# Patient Record
Sex: FEMALE | Race: WHITE | NOT HISPANIC OR LATINO | Employment: FULL TIME | URBAN - METROPOLITAN AREA
[De-identification: names, ages, dates, MRNs, and addresses within clinical notes are randomized per-mention and may not be internally consistent; named-entity substitution may affect disease eponyms.]

---

## 2017-04-20 ENCOUNTER — AMBULATORY - HEALTHEAST (OUTPATIENT)
Dept: MULTI SPECIALTY CLINIC | Facility: CLINIC | Age: 27
End: 2017-04-20

## 2017-04-20 LAB — PAP SMEAR - HIM PATIENT REPORTED: NORMAL

## 2020-06-29 ENCOUNTER — OFFICE VISIT - HEALTHEAST (OUTPATIENT)
Dept: FAMILY MEDICINE | Facility: CLINIC | Age: 30
End: 2020-06-29

## 2020-06-29 DIAGNOSIS — J45.40 MODERATE PERSISTENT ASTHMA WITHOUT COMPLICATION: ICD-10-CM

## 2020-06-29 DIAGNOSIS — F41.9 ANXIETY: ICD-10-CM

## 2020-06-29 DIAGNOSIS — J30.2 SEASONAL ALLERGIES: ICD-10-CM

## 2020-06-29 DIAGNOSIS — Z30.9 ENCOUNTER FOR CONTRACEPTIVE MANAGEMENT, UNSPECIFIED TYPE: ICD-10-CM

## 2020-06-29 DIAGNOSIS — L30.9 DERMATITIS: ICD-10-CM

## 2020-06-29 RX ORDER — ALBUTEROL SULFATE 90 UG/1
AEROSOL, METERED RESPIRATORY (INHALATION)
Qty: 1 INHALER | Refills: 1 | Status: SHIPPED | OUTPATIENT
Start: 2020-06-29 | End: 2022-02-22

## 2020-06-29 RX ORDER — TRIAMCINOLONE ACETONIDE 1 MG/G
CREAM TOPICAL
Qty: 30 G | Refills: 3 | Status: SHIPPED | OUTPATIENT
Start: 2020-06-29 | End: 2022-09-20

## 2020-07-18 ENCOUNTER — SURGERY - HEALTHEAST (OUTPATIENT)
Dept: SURGERY | Facility: HOSPITAL | Age: 30
End: 2020-07-18

## 2020-07-18 ENCOUNTER — ANESTHESIA - HEALTHEAST (OUTPATIENT)
Dept: SURGERY | Facility: HOSPITAL | Age: 30
End: 2020-07-18

## 2020-07-18 ASSESSMENT — MIFFLIN-ST. JEOR
SCORE: 1668.43
SCORE: 1704.72

## 2020-07-22 ENCOUNTER — AMBULATORY - HEALTHEAST (OUTPATIENT)
Dept: CARE COORDINATION | Facility: CLINIC | Age: 30
End: 2020-07-22

## 2020-07-22 DIAGNOSIS — J45.40 MODERATE PERSISTENT ASTHMA WITHOUT COMPLICATION: ICD-10-CM

## 2020-07-23 ENCOUNTER — COMMUNICATION - HEALTHEAST (OUTPATIENT)
Dept: NURSING | Facility: CLINIC | Age: 30
End: 2020-07-23

## 2020-07-30 ENCOUNTER — OFFICE VISIT - HEALTHEAST (OUTPATIENT)
Dept: SURGERY | Facility: CLINIC | Age: 30
End: 2020-07-30

## 2020-07-30 DIAGNOSIS — K35.30 ACUTE APPENDICITIS WITH LOCALIZED PERITONITIS, WITHOUT PERFORATION, ABSCESS, OR GANGRENE: ICD-10-CM

## 2020-08-01 ENCOUNTER — COMMUNICATION - HEALTHEAST (OUTPATIENT)
Dept: FAMILY MEDICINE | Facility: CLINIC | Age: 30
End: 2020-08-01

## 2020-08-01 DIAGNOSIS — J45.40 MODERATE PERSISTENT ASTHMA WITHOUT COMPLICATION: ICD-10-CM

## 2020-08-12 ENCOUNTER — AMBULATORY - HEALTHEAST (OUTPATIENT)
Dept: FAMILY MEDICINE | Facility: CLINIC | Age: 30
End: 2020-08-12

## 2020-08-12 DIAGNOSIS — J45.40 MODERATE PERSISTENT ASTHMA WITHOUT COMPLICATION: ICD-10-CM

## 2020-10-19 ENCOUNTER — OFFICE VISIT - HEALTHEAST (OUTPATIENT)
Dept: FAMILY MEDICINE | Facility: CLINIC | Age: 30
End: 2020-10-19

## 2020-10-19 DIAGNOSIS — R30.0 DYSURIA: ICD-10-CM

## 2020-10-19 LAB
ALBUMIN UR-MCNC: NEGATIVE MG/DL
APPEARANCE UR: CLEAR
BACTERIA #/AREA URNS HPF: ABNORMAL HPF
BILIRUB UR QL STRIP: NEGATIVE
COLOR UR AUTO: YELLOW
GLUCOSE UR STRIP-MCNC: NEGATIVE MG/DL
HGB UR QL STRIP: ABNORMAL
KETONES UR STRIP-MCNC: NEGATIVE MG/DL
LEUKOCYTE ESTERASE UR QL STRIP: NEGATIVE
NITRATE UR QL: NEGATIVE
PH UR STRIP: 7 [PH] (ref 5–8)
RBC #/AREA URNS AUTO: ABNORMAL HPF
SP GR UR STRIP: 1.01 (ref 1–1.03)
SQUAMOUS #/AREA URNS AUTO: ABNORMAL LPF
UROBILINOGEN UR STRIP-ACNC: ABNORMAL
WBC #/AREA URNS AUTO: ABNORMAL HPF

## 2020-10-21 ENCOUNTER — COMMUNICATION - HEALTHEAST (OUTPATIENT)
Dept: FAMILY MEDICINE | Facility: CLINIC | Age: 30
End: 2020-10-21

## 2020-10-21 LAB — BACTERIA SPEC CULT: ABNORMAL

## 2020-10-29 ENCOUNTER — OFFICE VISIT - HEALTHEAST (OUTPATIENT)
Dept: FAMILY MEDICINE | Facility: CLINIC | Age: 30
End: 2020-10-29

## 2020-10-29 DIAGNOSIS — N39.0 URINARY TRACT INFECTION: ICD-10-CM

## 2020-10-29 DIAGNOSIS — J45.40 MODERATE PERSISTENT ASTHMA WITHOUT COMPLICATION: ICD-10-CM

## 2020-10-29 DIAGNOSIS — Z30.9 ENCOUNTER FOR CONTRACEPTIVE MANAGEMENT, UNSPECIFIED TYPE: ICD-10-CM

## 2020-10-29 DIAGNOSIS — D50.9 IRON DEFICIENCY ANEMIA, UNSPECIFIED IRON DEFICIENCY ANEMIA TYPE: ICD-10-CM

## 2020-10-29 DIAGNOSIS — F41.9 ANXIETY: ICD-10-CM

## 2020-10-29 DIAGNOSIS — E55.9 VITAMIN D DEFICIENCY: ICD-10-CM

## 2020-10-29 LAB
ALBUMIN UR-MCNC: NEGATIVE MG/DL
APPEARANCE UR: CLEAR
BACTERIA #/AREA URNS HPF: ABNORMAL HPF
BILIRUB UR QL STRIP: NEGATIVE
COLOR UR AUTO: YELLOW
GLUCOSE UR STRIP-MCNC: NEGATIVE MG/DL
HGB UR QL STRIP: ABNORMAL
KETONES UR STRIP-MCNC: NEGATIVE MG/DL
LEUKOCYTE ESTERASE UR QL STRIP: ABNORMAL
NITRATE UR QL: NEGATIVE
PH UR STRIP: 7.5 [PH] (ref 5–8)
RBC #/AREA URNS AUTO: ABNORMAL HPF
SP GR UR STRIP: 1.01 (ref 1–1.03)
SQUAMOUS #/AREA URNS AUTO: ABNORMAL LPF
UROBILINOGEN UR STRIP-ACNC: ABNORMAL
WBC #/AREA URNS AUTO: ABNORMAL HPF

## 2020-10-29 ASSESSMENT — MIFFLIN-ST. JEOR: SCORE: 1759.15

## 2020-10-31 LAB — BACTERIA SPEC CULT: ABNORMAL

## 2020-11-04 ENCOUNTER — COMMUNICATION - HEALTHEAST (OUTPATIENT)
Dept: FAMILY MEDICINE | Facility: CLINIC | Age: 30
End: 2020-11-04

## 2021-03-09 ENCOUNTER — COMMUNICATION - HEALTHEAST (OUTPATIENT)
Dept: FAMILY MEDICINE | Facility: CLINIC | Age: 31
End: 2021-03-09

## 2021-04-05 ENCOUNTER — COMMUNICATION - HEALTHEAST (OUTPATIENT)
Dept: FAMILY MEDICINE | Facility: CLINIC | Age: 31
End: 2021-04-05

## 2021-04-05 ENCOUNTER — COMMUNICATION - HEALTHEAST (OUTPATIENT)
Dept: SCHEDULING | Facility: CLINIC | Age: 31
End: 2021-04-05

## 2021-05-24 ENCOUNTER — OFFICE VISIT - HEALTHEAST (OUTPATIENT)
Dept: FAMILY MEDICINE | Facility: CLINIC | Age: 31
End: 2021-05-24

## 2021-05-24 DIAGNOSIS — Z30.9 ENCOUNTER FOR CONTRACEPTIVE MANAGEMENT, UNSPECIFIED TYPE: ICD-10-CM

## 2021-05-24 DIAGNOSIS — E55.9 VITAMIN D DEFICIENCY: ICD-10-CM

## 2021-05-24 DIAGNOSIS — Z12.4 SCREENING FOR CERVICAL CANCER: ICD-10-CM

## 2021-05-24 DIAGNOSIS — F41.9 ANXIETY: ICD-10-CM

## 2021-05-24 DIAGNOSIS — F32.0 MILD MAJOR DEPRESSION (H): ICD-10-CM

## 2021-05-24 DIAGNOSIS — D50.9 IRON DEFICIENCY ANEMIA, UNSPECIFIED IRON DEFICIENCY ANEMIA TYPE: ICD-10-CM

## 2021-05-24 DIAGNOSIS — Z11.59 ENCOUNTER FOR HEPATITIS C SCREENING TEST FOR LOW RISK PATIENT: ICD-10-CM

## 2021-05-24 DIAGNOSIS — J30.2 SEASONAL ALLERGIES: ICD-10-CM

## 2021-05-24 DIAGNOSIS — E66.812 CLASS 2 OBESITY WITH BODY MASS INDEX (BMI) OF 37.0 TO 37.9 IN ADULT, UNSPECIFIED OBESITY TYPE, UNSPECIFIED WHETHER SERIOUS COMORBIDITY PRESENT: ICD-10-CM

## 2021-05-24 DIAGNOSIS — J45.40 MODERATE PERSISTENT ASTHMA WITHOUT COMPLICATION: ICD-10-CM

## 2021-05-24 DIAGNOSIS — Z11.3 SCREEN FOR STD (SEXUALLY TRANSMITTED DISEASE): ICD-10-CM

## 2021-05-24 LAB
ALBUMIN SERPL-MCNC: 3.5 G/DL (ref 3.5–5)
ALP SERPL-CCNC: 73 U/L (ref 45–120)
ALT SERPL W P-5'-P-CCNC: <9 U/L (ref 0–45)
ANION GAP SERPL CALCULATED.3IONS-SCNC: 15 MMOL/L (ref 5–18)
AST SERPL W P-5'-P-CCNC: 13 U/L (ref 0–40)
BASOPHILS # BLD AUTO: 0 THOU/UL (ref 0–0.2)
BASOPHILS NFR BLD AUTO: 0 % (ref 0–2)
BILIRUB SERPL-MCNC: 0.3 MG/DL (ref 0–1)
BUN SERPL-MCNC: 8 MG/DL (ref 8–22)
CALCIUM SERPL-MCNC: 8.7 MG/DL (ref 8.5–10.5)
CHLORIDE BLD-SCNC: 106 MMOL/L (ref 98–107)
CHOLEST SERPL-MCNC: 161 MG/DL
CO2 SERPL-SCNC: 17 MMOL/L (ref 22–31)
CREAT SERPL-MCNC: 0.65 MG/DL (ref 0.6–1.1)
EOSINOPHIL # BLD AUTO: 0.4 THOU/UL (ref 0–0.4)
EOSINOPHIL NFR BLD AUTO: 4 % (ref 0–6)
ERYTHROCYTE [DISTWIDTH] IN BLOOD BY AUTOMATED COUNT: 14 % (ref 11–14.5)
FASTING STATUS PATIENT QL REPORTED: YES
GFR SERPL CREATININE-BSD FRML MDRD: >60 ML/MIN/1.73M2
GLUCOSE BLD-MCNC: 80 MG/DL (ref 70–125)
HCT VFR BLD AUTO: 37.9 % (ref 35–47)
HDLC SERPL-MCNC: 54 MG/DL
HGB BLD-MCNC: 12.4 G/DL (ref 12–16)
HIV 1+2 AB+HIV1 P24 AG SERPL QL IA: NEGATIVE
IMM GRANULOCYTES # BLD: 0 THOU/UL
IMM GRANULOCYTES NFR BLD: 0 %
LDLC SERPL CALC-MCNC: 85 MG/DL
LYMPHOCYTES # BLD AUTO: 3.4 THOU/UL (ref 0.8–4.4)
LYMPHOCYTES NFR BLD AUTO: 36 % (ref 20–40)
MCH RBC QN AUTO: 27.6 PG (ref 27–34)
MCHC RBC AUTO-ENTMCNC: 32.7 G/DL (ref 32–36)
MCV RBC AUTO: 84 FL (ref 80–100)
MONOCYTES # BLD AUTO: 0.6 THOU/UL (ref 0–0.9)
MONOCYTES NFR BLD AUTO: 6 % (ref 2–10)
NEUTROPHILS # BLD AUTO: 5.1 THOU/UL (ref 2–7.7)
NEUTROPHILS NFR BLD AUTO: 53 % (ref 50–70)
PLATELET # BLD AUTO: 340 THOU/UL (ref 140–440)
PMV BLD AUTO: 9.6 FL (ref 7–10)
POTASSIUM BLD-SCNC: 4.2 MMOL/L (ref 3.5–5)
PROT SERPL-MCNC: 6.9 G/DL (ref 6–8)
RBC # BLD AUTO: 4.5 MILL/UL (ref 3.8–5.4)
SODIUM SERPL-SCNC: 138 MMOL/L (ref 136–145)
TRIGL SERPL-MCNC: 111 MG/DL
WBC: 9.6 THOU/UL (ref 4–11)

## 2021-05-24 RX ORDER — LEVONORGESTREL AND ETHINYL ESTRADIOL 0.15-0.03
1 KIT ORAL DAILY
Qty: 90 TABLET | Refills: 3 | Status: SHIPPED | OUTPATIENT
Start: 2021-05-24 | End: 2022-04-15

## 2021-05-24 RX ORDER — MONTELUKAST SODIUM 10 MG/1
10 TABLET ORAL DAILY
Qty: 90 TABLET | Refills: 3 | Status: SHIPPED | OUTPATIENT
Start: 2021-05-24 | End: 2022-05-10

## 2021-05-24 ASSESSMENT — ANXIETY QUESTIONNAIRES
2. NOT BEING ABLE TO STOP OR CONTROL WORRYING: SEVERAL DAYS
6. BECOMING EASILY ANNOYED OR IRRITABLE: MORE THAN HALF THE DAYS
4. TROUBLE RELAXING: SEVERAL DAYS
1. FEELING NERVOUS, ANXIOUS, OR ON EDGE: SEVERAL DAYS
3. WORRYING TOO MUCH ABOUT DIFFERENT THINGS: MORE THAN HALF THE DAYS
5. BEING SO RESTLESS THAT IT IS HARD TO SIT STILL: SEVERAL DAYS
IF YOU CHECKED OFF ANY PROBLEMS ON THIS QUESTIONNAIRE, HOW DIFFICULT HAVE THESE PROBLEMS MADE IT FOR YOU TO DO YOUR WORK, TAKE CARE OF THINGS AT HOME, OR GET ALONG WITH OTHER PEOPLE: SOMEWHAT DIFFICULT
GAD7 TOTAL SCORE: 8
7. FEELING AFRAID AS IF SOMETHING AWFUL MIGHT HAPPEN: NOT AT ALL

## 2021-05-24 ASSESSMENT — MIFFLIN-ST. JEOR: SCORE: 1772.53

## 2021-05-24 ASSESSMENT — PATIENT HEALTH QUESTIONNAIRE - PHQ9: SUM OF ALL RESPONSES TO PHQ QUESTIONS 1-9: 9

## 2021-05-25 LAB
25(OH)D3 SERPL-MCNC: 25.9 NG/ML (ref 30–80)
25(OH)D3 SERPL-MCNC: 25.9 NG/ML (ref 30–80)
HCV AB SERPL QL IA: NEGATIVE
HPV SOURCE: NORMAL
HUMAN PAPILLOMA VIRUS 16 DNA: NEGATIVE
HUMAN PAPILLOMA VIRUS 18 DNA: NEGATIVE
HUMAN PAPILLOMA VIRUS FINAL DIAGNOSIS: NORMAL
HUMAN PAPILLOMA VIRUS OTHER HR: NEGATIVE
SPECIMEN DESCRIPTION: NORMAL

## 2021-05-26 ENCOUNTER — COMMUNICATION - HEALTHEAST (OUTPATIENT)
Dept: FAMILY MEDICINE | Facility: CLINIC | Age: 31
End: 2021-05-26

## 2021-05-26 DIAGNOSIS — Z30.9 ENCOUNTER FOR CONTRACEPTIVE MANAGEMENT, UNSPECIFIED TYPE: ICD-10-CM

## 2021-05-28 ASSESSMENT — ASTHMA QUESTIONNAIRES
ACT_TOTALSCORE: 22
ACT_TOTALSCORE: 25

## 2021-06-04 VITALS — HEIGHT: 65 IN | WEIGHT: 218 LBS | BODY MASS INDEX: 36.32 KG/M2

## 2021-06-05 VITALS
SYSTOLIC BLOOD PRESSURE: 128 MMHG | TEMPERATURE: 98.3 F | RESPIRATION RATE: 20 BRPM | BODY MASS INDEX: 38.44 KG/M2 | WEIGHT: 231 LBS | HEART RATE: 86 BPM | DIASTOLIC BLOOD PRESSURE: 78 MMHG

## 2021-06-05 VITALS
DIASTOLIC BLOOD PRESSURE: 75 MMHG | SYSTOLIC BLOOD PRESSURE: 135 MMHG | BODY MASS INDEX: 38.32 KG/M2 | HEIGHT: 65 IN | WEIGHT: 230 LBS | HEART RATE: 87 BPM | OXYGEN SATURATION: 97 %

## 2021-06-09 NOTE — PROGRESS NOTES
Clinic Care Coordination Contact  Community Health Worker Initial Outreach      Patient accepts CC: No, due to not having any concerns that she would like assistance with at this time. The patient understands that she is able to ask for assistance anytime in the future.

## 2021-06-09 NOTE — PROGRESS NOTES
Clinic Care Coordination Contact  [facility]/Voicemail       Clinical Data: Care Coordinator Outreach  Outreach attempted x 1.  Left message on patient's voicemail with call back information and requested return call.  Plan: Care Coordinator will try to reach patient again in 1-2 business days.    Next Outreach: 7/24/20

## 2021-06-09 NOTE — ANESTHESIA PREPROCEDURE EVALUATION
Anesthesia Evaluation      Patient summary reviewed     Airway   Mallampati: II   Pulmonary - normal exam   (+) asthma  moderate,                          Cardiovascular - normal exam  Exercise tolerance: > or = 4 METS   Neuro/Psych    (+) anxiety/panic attacks,     Endo/Other - negative ROS   (+) obesity,      GI/Hepatic/Renal - negative ROS      Other findings:   UPT neg  Hgb 12.4  Plt 320  Cr 0.70    Nose ring present      Dental - normal exam                        Anesthesia Plan  Planned anesthetic: general endotracheal    COVID pending - use airborne precautions    GETA, 1 PIV  Fentanyl prn, dilaudid prn, ketamine 40mg, toradol if OK with surgeon  Decadron, zofran, scopolamine patch  ASA 2   Induction: intravenous   Anesthetic plan and risks discussed with: patient  Anesthesia plan special considerations: antiemetics,   Post-op plan: routine recovery

## 2021-06-09 NOTE — PROGRESS NOTES
"Melissa Frank is a 29 y.o. female who is being evaluated via a billable telephone visit.      The patient has been notified of following:     \"This telephone visit will be conducted via a call between you and your physician/provider. We have found that certain health care needs can be provided without the need for a physical exam.  This service lets us provide the care you need with a short phone conversation.  If a prescription is necessary we can send it directly to your pharmacy.  If lab work is needed we can place an order for that and you can then stop by our lab to have the test done at a later time.    Telephone visits are billed at different rates depending on your insurance coverage. During this emergency period, for some insurers they may be billed the same as an in-person visit.  Please reach out to your insurance provider with any questions.    If during the course of the call the physician/provider feels a telephone visit is not appropriate, you will not be charged for this service.\"    Patient has given verbal consent to a Telephone visit? Yes    What phone number would you like to be contacted at? [phone]    Patient would like to receive their AVS by AVS Preference: Mail a copy.    Additional provider notes:   Chief Complaint: Med refills  needs refills on all meds.  Asthma, well controlled with Advair and occasional albuterol use, also singular  Sertraline controls anxiety well.  Needs refill of birth control pills.  Had taken monophasic in past, now on triphasic for the past year or so, thinks triphasic makes her a little nauseous in week before her period  Had Nexplanon in past, good for 8 months, then got her period frequently.  Last pap in 2017, normal, no hx abn paps  Current Med Hx, Fam Hx, Surg Hx all reviewed and updated in chart  Old records requested through Care Everywhere by myself, I reviewed records and updated chart.    Assessment/Plan:  1. Moderate persistent asthma without " complication  Well-controlled on present medications, refilled.  - montelukast (SINGULAIR) 10 mg tablet; Take 1 tablet (10 mg total) by mouth daily.  Dispense: 90 tablet; Refill: 3  - fluticasone propion-salmeteroL (ADVAIR DISKUS) 100-50 mcg/dose DISKUS; Inhale 1 puff 2 (two) times a day.  Dispense: 3 each; Refill: 3  - albuterol (PROAIR HFA;PROVENTIL HFA;VENTOLIN HFA) 90 mcg/actuation inhaler; 1-2 puffs every 4-6 hours, as needed for wheezing.  Dispense: 1 Inhaler; Refill: 1    2. Seasonal allergies  Well-controlled on present medication, refilled.  - montelukast (SINGULAIR) 10 mg tablet; Take 1 tablet (10 mg total) by mouth daily.  Dispense: 90 tablet; Refill: 3    3. Anxiety  Well-controlled on present medication, refilled.  - sertraline (ZOLOFT) 50 MG tablet; Take 1 tablet (50 mg total) by mouth daily.  Dispense: 90 tablet; Refill: 3    4. Contraceptive management - pills  Discussed continuing with same pill (Tri Femynor) or trying monophasic pill, that may decrease nausea.  She would like to try new monophasic pill.  No history of high blood pressure or blood clots, non-smoker.  She has not had any problems with birth control pills in the past, other than nausea mentioned above.  Rx sent.  She will follow-up if any problems.  UTD on pap.  - levonorgestrel-ethinyl estradiol (LEVORA 0.15/30, 28,) 0.15-0.03 mg per tablet; Take 1 tablet by mouth daily.  Dispense: 90 tablet; Refill: 3    5. Dermatitis  Uses occasionally as needed.  Refilled today.  - triamcinolone (KENALOG) 0.1 % cream; APPLY TO AFFECTED AREAS TWICE DAILY UNTIL CLEAR FOR A MAX OF 14 DAYS.  Dispense: 30 g; Refill: 3      Phone call duration:  8 minutes  14:33 14:41    TERRY Holland, [facility]

## 2021-06-09 NOTE — ANESTHESIA CARE TRANSFER NOTE
Last vitals:   Vitals:    07/18/20 1045   BP: 135/63   Pulse: (!) 109   Resp: 12   Temp: 36.6  C (97.8  F)   SpO2: 100%     Patient's level of consciousness is alert and awake    Spontaneous respirations: yes  Maintains airway independently: yes  Dentition unchanged: yes  Oropharynx: oropharynx clear of all foreign objects    QCDR Measures:  ASA# 20 - Surgical Safety Checklist: WHO surgical safety checklist completed prior to induction    PQRS# 430 - Adult PONV Prevention: 4558F - Pt received => 2 anti-emetic agents (different classes) preop & intraop  ASA# 8 - Peds PONV Prevention: NA - Not pediatric patient, not GA or 2 or more risk factors NOT present  PQRS# 424 - [place]-op Temp Management: 4559F - At least one body temp DOCUMENTED => 35.5C or 95.9F within required timeframe  PQRS# 426 - PACU Transfer Protocol: - Transfer of care checklist used  ASA# 14 - Acute Post-op Pain: ASA14B - Patient did NOT experience pain >= 7 out of 10

## 2021-06-09 NOTE — ANESTHESIA POSTPROCEDURE EVALUATION
Patient: Melissa Frank  Procedure(s):  APPENDECTOMY, LAPAROSCOPIC  Anesthesia type: general    Patient location: Phase II Recovery  Last vitals:   Vitals Value Taken Time   /57 7/18/2020 12:00 PM   Temp 36.6  C (97.8  F) 7/18/2020 10:45 AM   Pulse 95 7/18/2020 12:01 PM   Resp 20 7/18/2020 12:01 PM   SpO2 97 % 7/18/2020 12:01 PM   Vitals shown include unvalidated device data.  Post vital signs: stable  Level of consciousness: awake and responds to simple questions  Post-anesthesia pain: pain controlled  Post-anesthesia nausea and vomiting: no  Pulmonary: unassisted, return to baseline  Cardiovascular: stable and blood pressure at baseline  Hydration: adequate  Anesthetic events: no    QCDR Measures:  ASA# 11 - [place]-op Cardiac Arrest: ASA11B - Patient did NOT experience unanticipated cardiac arrest  ASA# 12 - [place]-op Mortality Rate: ASA12B - Patient did NOT die  ASA# 13 - PACU Re-Intubation Rate: ASA13B - Patient did NOT require a new airway mgmt  ASA# 10 - Composite Anes Safety: ASA10A - No serious adverse event    Additional Notes:

## 2021-06-10 NOTE — PROGRESS NOTES
"Melissa Frank is a 29 y.o. female who is being evaluated via a billable telephone visit.      The patient has been notified of following:     \"This telephone visit will be conducted via a call between you and your physician/provider. We have found that certain health care needs can be provided without the need for a physical exam.  This service lets us provide the care you need with a short phone conversation.  If a prescription is necessary we can send it directly to your pharmacy.  If lab work is needed we can place an order for that and you can then stop by our lab to have the test done at a later time.    Telephone visits are billed at different rates depending on your insurance coverage. During this emergency period, for some insurers they may be billed the same as an in-person visit.  Please reach out to your insurance provider with any questions.    If during the course of the call the physician/provider feels a telephone visit is not appropriate, you will not be charged for this service.\"    Patient has given verbal consent to a Telephone visit? Yes    HPI: Pt is s/p lap appy with Dr. Trinidad on 7/18/20.   she is doing well.  Pain is well controlled:  Yes, soreness at this point, tylenol as needed. No difficulties with the surgical wound/wounds, no reports of erythema or drainage.  she is eating well and denies fever and chills. Bowel function as returned to normal.     Assessment/Plan: Doing well after surgery and should follow up as needed.    Alphonse Richardson PA-C  [phone]  General Surgery       Phone call duration: 6 minutes    Alphonse Richardson PA-C    "

## 2021-06-10 NOTE — TELEPHONE ENCOUNTER
"Called and spoke with [facility] pharmacy  Staff, Shereen, Patient's insurance is not contracted with [facility], therefore, inhaler will not be covered. Called and left a message for patient to call back. Please ask patient which pharmacy she will like to use.     \" Okay to relay message\"    "

## 2021-06-10 NOTE — TELEPHONE ENCOUNTER
RN cannot approve Refill Request    RN can NOT refill this medication med is not covered by policy/route to provider. Last office visit: Visit date not found Last Physical: Visit date not found Last MTM visit: Visit date not found Last visit same specialty: Visit date not found.  Next visit within 3 mo: Visit date not found  Next physical within 3 mo: Visit date not found      Mae Cavanaugh, [facility] Triage/Med Refill 8/1/2020    Requested Prescriptions   Pending Prescriptions Disp Refills     WIXELA INHUB 100-50 mcg/dose DISKUS [Pharmacy Med Name: WIXELA 100-50 INHUB]  3     Sig: TAKE 1 PUFF BY MOUTH TWICE A DAY**INS. NOT CONTRACTED**       There is no refill protocol information for this order

## 2021-06-10 NOTE — TELEPHONE ENCOUNTER
Called and spoke with Melissa Frank , Message was given, Melissa Frank understood, She wanted to use [facility] on [address]. Pharmacy was changed.     DOD,   Are you willing to sign the rx to be sent to pharmacy that is contracted with patient's insurances.

## 2021-06-12 NOTE — PATIENT INSTRUCTIONS - HE
Check urine culture    Cipro 500 mg daily for 3 days    Push fluids    Void after intercourse    Follow up if symptoms do no resolve within 48-72 hrs.

## 2021-06-12 NOTE — PROGRESS NOTES
ASSESSMENT/ PLAN    Melissa was seen today for establish care and follow-up.    Urinary tract infection  Reviewed recent visit for UTI and she was treated with cipro x 3 days. Will restart cipro again pending UA/UC. She does have mild CVA tenderness on exam today but otherwise looks good.   -     Urinalysis-UC if Indicated  -     ciprofloxacin HCl (CIPRO) 500 MG tablet; Take 1 tablet (500 mg total) by mouth 2 (two) times a day for 7 days.  -     Culture, Urine    Moderate persistent asthma without complication  In good control. On wixela, singulair and prn albuterol. Advised PPSV23 and patient agreed.     Iron deficiency anemia, unspecified iron deficiency anemia type  History of this in past per my chart review. Recheck of lab work normal per my chart review of her previous clinic visit. Will recheck when she comes back in 6 months to ensure stability    Anxiety  Doing well. On zoloft 50 mgdaily.    Vitamin D deficiency  History of this. Will check in 6 months.    Contraceptive management - pills  Did not tolerate nexplanon in past. Continue with OCPs. No contraindication per chart review.    Other orders  -     Influenza, Seasonal Quad, PF =/> 6months  -     Pneumococcal polysaccharide vaccine 23-valent 1 yo or older, subq/IM        This note was created using Dragon dictation software, spelling errors may occur.     Meseret Carlin MD        SUBJECTIVE   Melissa Frank is a 30 y.o. old female who presented to clinic today for further evaluation of establishing care. Has a history of asthma, anxiety. On OCPs for birth control. Had nexplanon a couple years ago for about a year but she didn't like it because of irregular periods so she had it taken out and she went back on OCPs. Never pregnant before. Has a boyfriend. Not wanting to have a baby yet. Last pap smear 2017.       Review of Systems:  Negative except as noted in HPI      The following portions of the patient's history were reviewed and updated as  "appropriate: past medical history, past surgical history, family history, allergies, current medications and problem list.    Medical History  Active Ambulatory (Non-Hospital) Problems    Diagnosis     Appendicitis     Contraceptive management - pills     Dermatitis     Anxiety     Iron deficiency anemia     Vitamin D deficiency     Dysmenorrhea     Migraine headache     Moderate persistent asthma     Seasonal allergies     Past Medical History:   Diagnosis Date     Anxiety      Asthma        Surgical History  She  has a past surgical history that includes [place] tooth extraction and pr lap,appendectomy (N/A, 7/18/2020).    Social History  Reviewed, and she  reports that she has never smoked. She has never used smokeless tobacco. She reports current alcohol use. She reports previous drug use.    Family History  Reviewed, and family history includes Asthma in her brother, father, and mother; Breast cancer in her maternal aunt and maternal grandmother; Hypertension in her maternal grandmother and mother; Leukemia in her paternal grandfather; Ovarian cancer in her maternal aunt.    Medications  Reviewed and reconciled    Allergies  No Known Allergies      OBJECTIVE  Physical Exam:  Vital signs: /75 (Patient Site: Left Arm, Patient Position: Sitting, Cuff Size: Adult Large)   Pulse 87   Ht 5' 5\" (1.651 m)   Wt (!) 230 lb (104.3 kg)   SpO2 97%   BMI 38.27 kg/m    Weight: (!) 230 lb (104.3 kg)    General appearance: pleasant, appears stated age, cooperative and in no distress  Respiratory: clear to auscultation bilaterally, good air movement throughout, no wheezing or crackles, speaking full sentences without difficulty  Cardiovascular: regular rate and rhythm, no murmur appreciated, no leg edema  Abdomen: active bowel sounds, soft, non-tender, non-distended, left sided CVA tenderness - mild  Skin: no rashes  Neuro: alert oriented x 3, grossly normal otherwise  Psych: normal affect, appropriate conversation "

## 2021-06-12 NOTE — PROGRESS NOTES
DIAGNOSIS:  1. Dysuria, possible very early UTI with good history. Urinalysis-UC if Indicated    Culture, Urine    ciprofloxacin HCl (CIPRO) 500 MG tablet       PLAN:     Check urine culture    Cipro 500 mg daily for 3 days.  Pt elects to treat now and realizes that the culture may come back negative.    Push fluids    Void after intercourse    Follow up if symptoms do no resolve within 48-72 hrs.            HPI:  This am dysuria. Also frequency and urgency.  No blood.   Urine looked cloudy this am.  No backpain.  No fever or chills.  No recent UTIs.  LMP 9-26-20.          Current Outpatient Medications on File Prior to Visit   Medication Sig Dispense Refill     albuterol (PROAIR HFA;PROVENTIL HFA;VENTOLIN HFA) 90 mcg/actuation inhaler 1-2 puffs every 4-6 hours, as needed for wheezing. 1 Inhaler 1     cetirizine (ZYRTEC) 10 MG tablet Take 10 mg by mouth daily.       fluticasone propion-salmeteroL (WIXELA INHUB) 100-50 mcg/dose DISKUS TAKE 1 PUFF BY MOUTH TWICE A  each 3     levonorgestrel-ethinyl estradiol (LEVORA 0.15/30, 28,) 0.15-0.03 mg per tablet Take 1 tablet by mouth daily. 90 tablet 3     montelukast (SINGULAIR) 10 mg tablet Take 1 tablet (10 mg total) by mouth daily. 90 tablet 3     sertraline (ZOLOFT) 50 MG tablet Take 1 tablet (50 mg total) by mouth daily. 90 tablet 3     triamcinolone (KENALOG) 0.1 % cream APPLY TO AFFECTED AREAS TWICE DAILY UNTIL CLEAR FOR A MAX OF 14 DAYS. 30 g 3     [DISCONTINUED] acetaminophen (TYLENOL) 500 MG tablet Take 500 mg by mouth every 6 (six) hours as needed for pain.       [DISCONTINUED] HYDROcodone-acetaminophen 5-325 mg per tablet Take 1-2 tablets by mouth every 4 (four) hours as needed for pain. 16 tablet 0     [DISCONTINUED] HYDROcodone-acetaminophen 5-325 mg per tablet Take 1-2 tablets by mouth every 4 (four) hours as needed for pain. 16 tablet 0     No current facility-administered medications on file prior to visit.        Pmh: reviewed  Psh:  reviewed  Allergy:  reviewed      EXAM:    /78 (Patient Site: Left Arm, Patient Position: Sitting, Cuff Size: Adult Large)   Pulse 86   Temp 98.3  F (36.8  C) (Oral)   Resp 20   Wt (!) 231 lb (104.8 kg)   BMI 38.44 kg/m    GEN:   ALERT, NAD, ORIENTED TIMES THRE  LUNGS: CTA  COR: RRR WITHOUT MURMUR  ABD: SOFT, +BS, NONTX WITHOUT GUARDING OR PERITONEAL SIGNS  SKIN: NO RASH , ULCERS OR LESIONS NOTED:  EXT: WITHOUT EDEMA/SWELLING    Recent Results (from the past 168 hour(s))   Urinalysis-UC if Indicated    Collection Time: 10/19/20 12:03 PM   Result Value Ref Range    Color, UA Yellow Colorless, Yellow, Straw, Light Yellow    Clarity, UA Clear Clear    Glucose, UA Negative Negative    Bilirubin, UA Negative Negative    Ketones, UA Negative Negative    Specific Gravity, UA 1.010 1.005 - 1.030    Blood, UA Trace (!) Negative    pH, UA 7.0 5.0 - 8.0    Protein, UA Negative Negative mg/dL    Urobilinogen, UA 0.2 E.U./dL 0.2 E.U./dL, 1.0 E.U./dL    Nitrite, UA Negative Negative    Leukocytes, UA Negative Negative

## 2021-06-16 PROBLEM — F41.9 ANXIETY: Status: ACTIVE | Noted: 2020-01-15

## 2021-06-16 PROBLEM — F32.0 MILD MAJOR DEPRESSION (H): Status: ACTIVE | Noted: 2021-05-24

## 2021-06-16 PROBLEM — L30.9 DERMATITIS: Status: ACTIVE | Noted: 2020-06-29

## 2021-06-16 PROBLEM — Z30.9 ENCOUNTER FOR CONTRACEPTIVE MANAGEMENT, UNSPECIFIED TYPE: Status: ACTIVE | Noted: 2020-06-29

## 2021-06-16 NOTE — TELEPHONE ENCOUNTER
Reason for Disposition    [1] MODERATE difficulty breathing (e.g., speaks in phrases, SOB even at rest, pulse 100-120) AND [2] NEW-onset or WORSE than normal    Additional Information    Negative: [1] Breathing stopped AND [2] hasn't returned    Negative: Choking on something    Negative: Severe difficulty breathing (e.g., struggling for each breath, speaks in single words)    Negative: Bluish (or gray) lips or face now    Negative: Difficult to awaken or acting confused (e.g., disoriented, slurred speech)    Negative: Passed out (i.e., lost consciousness, collapsed and was not responding)    Negative: Wheezing started suddenly after medicine, an allergic food or bee sting    Negative: Stridor    Negative: Slow, shallow and weak breathing    Negative: Sounds like a life-threatening emergency to the triager    Negative: Chest pain    Negative: [1] Wheezing (high pitched whistling sound) AND [2] previous asthma attacks or use of asthma medicines    Negative: [1] Difficulty breathing AND [2] only present when coughing    Negative: [1] Difficulty breathing AND [2] only from stuffy or runny nose    Protocols used: BREATHING DIFFICULTY-A-AH

## 2021-06-16 NOTE — TELEPHONE ENCOUNTER
Has asthma and has covid.  Really shortness of breath.  HAs neb machine with no meds.  Weak and out of breath to walk across room.  Needs to present to ER for evaluation of covid.    Agrees with plan.    Isis Bourne RN [facility] Triage Nurse Advisor

## 2021-06-21 NOTE — LETTER
Letter by Meseret Carlin MD at      Author: Meseret Carlin MD Service: -- Author Type: --    Filed:  Encounter Date: 5/24/2021 Status: (Other)       My Asthma Action Plan    Name: Melissa Frank   YOB: 1990  Date: 5/24/2021   My doctor: Meseret Carlin MD   My clinic: [facility]        My Control Medicine: Fluticasone propionate + salmeterol (Advair Diskus or Wixela Inhub) -  100/50 mcg 1 puff 2 times daily  My Rescue Medicine: Albuterol (Proair/Ventolin/Proventil HFA) 2-4 puffs EVERY 4 HOURS as needed. Use a spacer if recommended by your provider.   My Oral Steroid Medicine: prednisone if needed, will need appointment My Asthma Severity:   Moderate Persistent  Know your asthma triggers: Patient is unaware of triggers               GREEN ZONE   Good Control    I feel good    No cough or wheeze    Can work, sleep and play without asthma symptoms     Take your asthma control medicine every day.     1. If exercise triggers your asthma, take your rescue medication    15 minutes before exercise or sports, and    During exercise if you have asthma symptoms  2. Spacer to use with inhaler: If you have a spacer, make sure to use it with your inhaler             YELLOW ZONE Getting Worse  I have ANY of these:    I do not feel good    Cough or wheeze    Chest feels tight    Wake up at night 1. Keep taking your Green Zone medications  2. Start taking your rescue medicine:    every 20 minutes for up to 1 hour. Then every 4 hours for 24-48 hours.  3. If you stay in the Yellow Zone for more than 12-24 hours, contact your doctor.  4. If you do not return to the Green Zone in 12-24 hours or you get worse, start taking your oral steroid medicine if prescribed by your provider.           RED ZONE Medical Alert - Get Help  I have ANY of these:    I feel awful    Medicine is not helping    Breathing getting harder    Trouble walking or talking    Nose opens wide to breathe      1. Take your rescue medicine NOW  2. If your provider has prescribed an oral steroid medicine, start taking it NOW  3. Call your doctor NOW  4. If you are still in the Red Zone after 20 minutes and you have not reached your doctor:    Take your rescue medicine again and    Call 911 or go to the emergency room right away    See your regular doctor within 2 weeks of an Emergency Room or Urgent Care visit for follow-up treatment.          Annual Reminders:  Meet with Asthma Educator,  Flu Shot in the Fall, consider Pneumonia Vaccination for patients with asthma (aged 19 and older).    Pharmacy:   [id number] - [place], [place] - [facility] [facility] [address] [place] [address] [address] [address] [address]  [facility] [address]  Phone: [phone] Fax: [phone]    [facility] - [place], [place] - [facility] [facility]  [address] [facility] [address]  Phone: [phone] Fax: [phone]      Electronically signed by Meseret Carlin MD   Date: 05/24/21                    Asthma Triggers  How To Control Things That Make Your Asthma Worse    Triggers are things that make your asthma worse.  Look at the list below to help you find your triggers and what you can do about them.  You can help prevent asthma flare-ups by staying away from your triggers.      Trigger                                                          What you can do   Cigarette Smoke  Tobacco smoke can make asthma worse. Do not allow smoking in your home, car or around you.  Be sure no one smokes at a paulette day care or school.  If you smoke, ask your health care provider for ways to help you quit.  Ask family members to quit too.  Ask your health care provider for a referral to Quit Plan to help you quit smoking, or call 5-430-817-PLAN.     Colds, Flu, Bronchitis  These are common triggers of asthma. Wash your hands often.  Dont touch your eyes, nose or mouth.  Get a flu shot every year.     Dust  Mites  These are tiny bugs that live in cloth or carpet. They are too small to see. Wash sheets and blankets in hot water every week.   Encase pillows and mattress in dust mite proof covers.  Avoid having carpet if you can. If you have carpet, vacuum weekly.   Use a dust mask and HEPA vacuum.   Pollen and Outdoor Mold  Some people are allergic to trees, grass, or weed pollen, or molds. Try to keep your windows closed.  Limit time out doors when pollen count is high.   Ask you health care provider about taking medicine during allergy season.     Animal Dander  Some people are allergic to skin flakes, urine or saliva from pets with fur or feathers. Keep pets with fur or feathers out of your home.    If you cant keep the pet outdoors, then keep the pet out of your bedroom.  Keep the bedroom door closed.  Keep pets off cloth furniture and away from stuffed toys.     Mice, Rats, and Cockroaches   Some people are allergic to the waste from these pests.   Cover food and garbage.  Clean up spills and food crumbs.  Store grease in the refrigerator.   Keep food out of the bedroom.   Indoor Mold  This can be a trigger if your home has high moisture. Fix leaking faucets, pipes, or other sources of water.   Clean moldy surfaces.  Dehumidify basement if it is damp and smelly.   Smoke, Strong Odors, and Sprays  These can reduce air quality. Stay away from strong odors and sprays, such as perfume, powder, hair spray, paints, smoke incense, paint, cleaning products, candles and new carpet.   Exercise or Sports  Some people with asthma have this trigger. Be active!  Ask your doctor about taking medicine before sports or exercise to prevent symptoms.    Warm up for 5-10 minutes before and after sports or exercise.     Other Triggers of Asthma  Cold air:  Cover your nose and mouth with a scarf.  Sometimes laughing or crying can be a trigger.  Some medicines and food can trigger asthma.

## 2021-06-21 NOTE — LETTER
Letter by Bang Diaz MD at      Author: Bang Diaz MD Service: -- Author Type: --    Filed:  Encounter Date: 10/21/2020 Status: (Other)         Melissa Frank  [address] [place] [id number]             October 21, 2020         Dear Ms. Frank,    Below are the results from your recent visit:    Resulted Orders   Urinalysis-UC if Indicated   Result Value Ref Range    Color, UA Yellow Colorless, Yellow, Straw, Light Yellow    Clarity, UA Clear Clear    Glucose, UA Negative Negative    Bilirubin, UA Negative Negative    Ketones, UA Negative Negative    Specific Gravity, UA 1.010 1.005 - 1.030    Blood, UA Trace (!) Negative    pH, UA 7.0 5.0 - 8.0    Protein, UA Negative Negative mg/dL    Urobilinogen, UA 0.2 E.U./dL 0.2 E.U./dL, 1.0 E.U./dL    Nitrite, UA Negative Negative    Leukocytes, UA Negative Negative    Bacteria, UA None Seen None Seen hpf    RBC, UA 0-2 None Seen, 0-2 hpf    WBC, UA None Seen None Seen, 0-5 hpf    Squam Epithel, UA 0-5 None Seen, 0-5 lpf    Narrative    UC not indicated   Culture, Urine   Result Value Ref Range    Culture >100,000 col/ml Staphylococcus saprophyticus (!)       Comment:      Responds to urine concentrations of Nitrofurantoin, Trimethoprim/Sulfa, Levofloxacin       Hi Melissa:  The urine culture came back positive.  Complete course of Cipro and follow up if there are any persistent symptoms.    Please call with questions or contact us using [employer].    Sincerely,        Electronically signed by Bang Diaz MD

## 2021-06-21 NOTE — LETTER
Letter by Meseret Carlin MD at      Author: Meseret Carlin MD Service: -- Author Type: --    Filed:  Encounter Date: 5/24/2021 Status: (Other)                    My Depression Action Plan  Name: Melissa Frank   Date of Birth 1990  Date: 5/24/2021    My Doctor: Meseret Carlin MD   My Clinic: [address] [facility] [address]  [phone]          GREEN    ZONE   Good Control    What it looks like:     Things are going generally well. You have normal ups and downs. You may even feel depressed from time to time, but bad moods usually last less than a day.   What you need to do:  1. Continue to care for yourself (see self care plan)  2. Check your depression survival kit and update it as needed  3. Follow your physicians recommendations including any medication.  4. Do not stop taking medication unless you consult with your physician first.           YELLOW         ZONE Getting Worse    What it looks like:     Depression is starting to interfere with your life.     It may be hard to get out of bed; you may be starting to isolate yourself from others.    Symptoms of depression are starting to last most all day and this has happened for several days.     You may have suicidal thoughts but they are not constant.   What you need to do:     1. Call your care team. Your response to treatment will improve if you keep your care team informed of your progress. Yellow periods are signs an adjustment may need to be made.     2. Continue your self-care.  Just get dressed and ready for the day.  Don't give yourself time to talk yourself out of it.    3. Talk to someone in your support network.    4. Open up your depression Depression Self-Care Plan / Wellness kit.           RED    ZONE Medical Alert - Get Help    What it looks like:     Depression is seriously interfering with your life.     You may experience these or other symptoms: You cant get out of bed most  days, cant work or engage in other necessary activities, you have trouble taking care of basic hygiene, or basic responsibilities, thoughts of suicide or death that will not go away, self-injurious behavior.     What you need to do:  1. Call your care team and request a same-day appointment. If they are not available (weekends or after hours) call your local crisis line, emergency room or 911.          Depression Self-Care Plan / Wellness Kit    Many people find that medication and therapy are helpful treatments for managing depression. In addition, making small changes to your everyday life can help to boost your mood and improve your wellbeing. Below are some tips for you to consider. Be sure to talk with your medical provider and/or behavioral health consultant if your symptoms are worsening or not improving.     Sleep  Sleep hygiene means all of the habits that support good, restful sleep. It includes maintaining a consistent bedtime and wake time, using your bedroom only for sleeping or sex, and keeping the bedroom dark and free of distractions like a computer, smartphone, or television.     Develop a Healthy Routine  Maintain good hygiene. Get out of bed in the morning, make your bed, brush your teeth, take a shower, and get dressed. Dont spend too much time viewing media that makes you feel stressed. Find time to relax each day.    Exercise  Get some form of exercise every day. This will help reduce pain and release endorphins, the feel good chemicals in your brain. It can be as simple as just going for a walk or doing some gardening, anything that will get you moving.      Diet  Strive to eat healthy foods, including fruits and vegetables. Drink plenty of water. Avoid excessive sugar, caffeine, alcohol, and other mood-altering substances.     Stay Connected with Others  Stay in touch with friends and family members.    Manage Your Mood  Try deep breathing, massage therapy, biofeedback, or meditation. Take  part in fun activities when you can. Try to find something to smile about each day.     Psychotherapy  Be open to working with a therapist if your provider recommends it.     Medication  Be sure to take your medication as prescribed. Most anti-depressants need to be taken every day. It usually takes several weeks for medications to work. Not all medicines work for all people. It is important to follow-up with your provider to make sure you have a treatment plan that is working for you. Do not stop your medication abruptly without first discussing it with your provider.    Crisis Resources   These hotlines are for both adults and children. They and are open 24 hours a day, 7 days a week unless noted otherwise.      National Suicide Prevention Lifeline   5-120-829-TALK (7001)      Crisis Text Line    www.crisistextline.org  Text HOME to 004790 from anywhere in the [place], anytime, about any type of crisis. A live, trained crisis counselor will receive the text and respond quickly.      [place] Lifeline for LGBTQ Youth  A national crisis intervention and suicide lifeline for LGBTQ youth under 25. Provides a safe place to talk without judgement. Call 1-622.579.6339; text START to 214476 or visit www.the[place]ay[place]project.org to talk to a trained counselor.      For [facility] crisis numbers, visit the [facility] website at:  https://[place].gov/dhs/people-we-serve/adults/health-care/mental-health/resources/crisis-contacts.jsp

## 2021-06-26 ENCOUNTER — HEALTH MAINTENANCE LETTER (OUTPATIENT)
Age: 31
End: 2021-06-26

## 2021-06-30 NOTE — PROGRESS NOTES
Progress Notes by Meseret Carlin MD at 5/24/2021 12:50 PM     Author: Meseret Carlin MD Service: -- Author Type: Physician    Filed: 5/24/2021  4:32 PM Encounter Date: 5/24/2021 Status: Signed    : Meseret Carlin MD (Physician)       FEMALE PREVENTATIVE EXAM    Assessment and Plan:     Patient has been advised of split billing requirements and indicates understanding: Yes    Melissa was seen today for annual exam.    Encounter for hepatitis C screening test for low risk patient  -     Lipid [place] FASTING  -     Comprehensive Metabolic Panel  -     Hepatitis C Antibody (Anti-HCV)    Moderate persistent asthma without complication  Stable. ACT score good.   -     fluticasone propion-salmeteroL (WIXELA INHUB) 100-50 mcg/dose DISKUS; TAKE 1 PUFF BY MOUTH TWICE A DAY  -     montelukast (SINGULAIR) 10 mg tablet; Take 1 tablet (10 mg total) by mouth daily.    Contraceptive management - pills  Tolerating well. Discussed IUD as an option. nexplanon caused increased irregular bleeding in the past.   -     levonorgestrel-ethinyl estradiol (LEVORA 0.15/30, 28,) 0.15-0.03 mg per tablet; Take 1 tablet by mouth daily.    Seasonal allergies  Would like further allergy testing and potentially shots.   -     montelukast (SINGULAIR) 10 mg tablet; Take 1 tablet (10 mg total) by mouth daily.  -     Ambulatory referral to Allergy    Anxiety  Has tried 75 mg of zoloft before but didn't tolerate side effects. Will send to psychology.   -     sertraline (ZOLOFT) 50 MG tablet; Take 1 tablet (50 mg total) by mouth daily.  -     AMB REFERRAL TO MENTAL HEALTH AND ADDICTION  - Adult (18+); Outpatient Treatment; Individual/Couples/Family/Group Therapy/Health Psychology; [facility]; Any Clinic Location; We will contact you to schedule the appointment or plea...    Iron deficiency anemia, unspecified iron deficiency anemia type  -     HM1(CBC and Differential)    Screening for cervical cancer  -     Gynecologic  Cytology (PAP Smear)    Vitamin D deficiency  -     Vitamin D, Total (25-Hydroxy)    Screen for STD (sexually transmitted disease)  -     HIV Antigen/Antibody Screening [facility]    Mild major depression (H)  zoloft 50 mg daily. Will send to psychology. Stressors at home. Possibly looking for a new job soon.   -     AMB REFERRAL TO MENTAL HEALTH AND ADDICTION  - Adult (18+); Outpatient Treatment; Individual/Couples/Family/Group Therapy/Health Psychology; [facility]; Any Clinic Location; We will contact you to schedule the appointment or plea...    Class 2 obesity with body mass index (BMI) of 37.0 to 37.9 in adult, unspecified obesity type, unspecified whether serious comorbidity present  Discussed diet/exercise and weight loss specialist referral. Patient will try on her own and will contact me for referral if needed.       Next follow up:  Return in about 1 year (around 5/24/2022) for Annual physical, chronic medical conditions.    Immunization Review  Adult Imm Review: No immunizations due today  BMI: 37.89  non-smoker    I discussed the following with the patient:   Adult Healthy Living: Importance of regular exercise  Healthy nutrition  Weight loss referral options        Subjective:   Chief Complaint: Melissa Frank is an 30 y.o. female here for a preventative health visit.    Patient has been advised of split billing requirements and indicates understanding: Yes  HPI:    Chief Complaint   Patient presents with   ? Annual Exam     Allergy referral to do allergy shots, fasting labs-would like calcium checked     Struggling with allergies worse in spring but year round.     Healthy Habits  Are you taking a daily aspirin? No  Do you typically exercising at least 40 min, 3-4 times per week?  NO - trying to walk her puppy more.   Do you usually eat at least 4 servings of fruit and vegetables a day, include whole grains and fiber and avoid regularly eating high fat foods? NO - working on getting  "fruits/veggies.   Have you had an eye exam in the past two years? Yes  Do you see a dentist twice per year? Yes  Do you have any concerns regarding sleep? No    Safety Screen  Do you feel you are safe where you are living?: Yes (5/24/2021 12:46 PM)  Do you feel you are safe in your relationship(s)?: Yes (5/24/2021 12:46 PM)      Review of Systems:  Please see above.  The rest of the review of systems are negative for all systems.     Pap History:   Yes - updated in Problem List and Health Maintenance accordingly  Cancer Screening       Status Date      PAP SMEAR Overdue 4/20/2020      Done 4/20/2017 PAP SMEAR EXTERNAL RESULT          Patient Care Team:  Meseret Carlin MD as PCP - General (Family Medicine)  Alphonse Richardson PA-C as Assigned Musculoskeletal Provider  Meseret Carlin MD as Assigned PCP        History     Reviewed By Date/Time Sections Reviewed    Nidia Aldana LPN 5/24/2021 12:45 PM Tobacco    Nidia Aldana LPN 5/24/2021 12:42 PM Tobacco            Objective:   Vital Signs:   Visit Vitals  /74 (Patient Site: Left Arm, Patient Position: Sitting, Cuff Size: Adult Large)   Pulse 77   Resp 16   Ht 5' 5.5\" (1.664 m)   Wt (!) 231 lb 3.2 oz (104.9 kg)   LMP 05/08/2021 (Exact Date)   BMI 37.89 kg/m           PHYSICAL EXAM  GENERAL: Healthy, alert and no distress  RESP: lungs clear to auscultation - no rales, rhonchi or wheezes, no rales , no rhonchi and no wheezes  BREAST: normal without masses, tenderness or nipple discharge and no palpable axillary masses or adenopathy  CV: regular rates and rhythm, normal S1 S2, no S3 or S4 and no murmur, click or rub  ABDOMEN: soft, nontender, without hepatosplenomegaly or masses   (female): normal female external genitalia, normal urethral meatus , vaginal mucosa pink, moist, well rugated and normal cervix, adnexae, and uterus without masses.  NEURO: Cranial nerves grossly intact. Mentation and speech appropriate for age.  PSYCH: Mentation appears " normal, affect normal/bright, judgement and insight intact, normal speech and appearance well-groomed           Medication List          Accurate as of May 24, 2021  4:27 PM. If you have any questions, ask your nurse or doctor.            CONTINUE taking these medications    albuterol 90 mcg/actuation inhaler  Also known as: PROAIR HFA;PROVENTIL HFA;VENTOLIN HFA  INSTRUCTIONS: 1-2 puffs every 4-6 hours, as needed for wheezing.  Doctor's comments: May substitute the equivalent medication per insurance preference.        cetirizine 10 MG tablet  Also known as: ZyrTEC  INSTRUCTIONS: Take 10 mg by mouth daily.        fluticasone propion-salmeteroL 100-50 mcg/dose DISKUS  Also known as: Wixela Inhub  INSTRUCTIONS: TAKE 1 PUFF BY MOUTH TWICE A DAY        levonorgestrel-ethinyl estradiol 0.15-0.03 mg per tablet  Also known as: Levora 0.15/30 (28)  INSTRUCTIONS: Take 1 tablet by mouth daily.        montelukast 10 mg tablet  Also known as: SINGULAIR  INSTRUCTIONS: Take 1 tablet (10 mg total) by mouth daily.        sertraline 50 MG tablet  Also known as: ZOLOFT  INSTRUCTIONS: Take 1 tablet (50 mg total) by mouth daily.        triamcinolone 0.1 % cream  Also known as: KENALOG  INSTRUCTIONS: APPLY TO AFFECTED AREAS TWICE DAILY UNTIL CLEAR FOR A MAX OF 14 DAYS.              Where to Get Your Medications      These medications were sent to [facility] - [address] [address]    Phone: [phone]     fluticasone propion-salmeteroL 100-50 mcg/dose DISKUS    levonorgestrel-ethinyl estradiol 0.15-0.03 mg per tablet    montelukast 10 mg tablet    sertraline 50 MG tablet         Additional Screenings Completed Today:

## 2021-07-06 VITALS
HEART RATE: 77 BPM | WEIGHT: 231.2 LBS | DIASTOLIC BLOOD PRESSURE: 74 MMHG | RESPIRATION RATE: 16 BRPM | BODY MASS INDEX: 37.16 KG/M2 | HEIGHT: 66 IN | SYSTOLIC BLOOD PRESSURE: 126 MMHG

## 2021-07-06 ASSESSMENT — PATIENT HEALTH QUESTIONNAIRE - PHQ9: SUM OF ALL RESPONSES TO PHQ QUESTIONS 1-9: 9

## 2021-07-08 ENCOUNTER — COMMUNICATION - HEALTHEAST (OUTPATIENT)
Dept: FAMILY MEDICINE | Facility: CLINIC | Age: 31
End: 2021-07-08

## 2021-07-08 DIAGNOSIS — J45.40 MODERATE PERSISTENT ASTHMA WITHOUT COMPLICATION: ICD-10-CM

## 2021-07-08 ASSESSMENT — ANXIETY QUESTIONNAIRES: GAD7 TOTAL SCORE: 8

## 2021-07-08 ASSESSMENT — ASTHMA QUESTIONNAIRES: ACT_TOTALSCORE: 24

## 2021-07-08 NOTE — TELEPHONE ENCOUNTER
Telephone Encounter by Angy Hightower MA at 7/8/2021  9:07 AM     Author: Angy Hightower MA Service: -- Author Type: Certified Medical Assistant    Filed: 7/8/2021  9:07 AM Encounter Date: 7/8/2021 Status: Signed    : Angy Hightower MA (Certified Medical Assistant)       Rx pended for [facility]

## 2021-07-20 ENCOUNTER — OFFICE VISIT (OUTPATIENT)
Dept: ALLERGY | Facility: CLINIC | Age: 31
End: 2021-07-20
Payer: COMMERCIAL

## 2021-07-20 VITALS — WEIGHT: 231 LBS | BODY MASS INDEX: 37.12 KG/M2 | HEART RATE: 82 BPM | HEIGHT: 66 IN | OXYGEN SATURATION: 99 %

## 2021-07-20 DIAGNOSIS — J30.89 ALLERGIC RHINITIS DUE TO DUST MITE: ICD-10-CM

## 2021-07-20 DIAGNOSIS — J30.89 ALLERGIC RHINITIS CAUSED BY MOLD: ICD-10-CM

## 2021-07-20 DIAGNOSIS — J45.30 MILD PERSISTENT ASTHMA WITHOUT COMPLICATION: Primary | ICD-10-CM

## 2021-07-20 DIAGNOSIS — J30.81 ALLERGIC RHINITIS DUE TO ANIMALS: ICD-10-CM

## 2021-07-20 PROCEDURE — 95004 PERQ TESTS W/ALRGNC XTRCS: CPT | Performed by: ALLERGY & IMMUNOLOGY

## 2021-07-20 PROCEDURE — 99204 OFFICE O/P NEW MOD 45 MIN: CPT | Mod: 25 | Performed by: ALLERGY & IMMUNOLOGY

## 2021-07-20 RX ORDER — ALBUTEROL SULFATE 90 UG/1
AEROSOL, METERED RESPIRATORY (INHALATION)
COMMUNITY
Start: 2020-06-29 | End: 2022-01-11

## 2021-07-20 RX ORDER — FLUOCINOLONE ACETONIDE 0.25 MG/G
OINTMENT TOPICAL
COMMUNITY
Start: 2020-01-22 | End: 2022-09-20

## 2021-07-20 RX ORDER — TRIAMCINOLONE ACETONIDE 1 MG/G
CREAM TOPICAL
COMMUNITY
Start: 2020-01-15 | End: 2022-01-11

## 2021-07-20 RX ORDER — HYDROCORTISONE 25 MG/G
OINTMENT TOPICAL
COMMUNITY
Start: 2020-01-22 | End: 2022-09-20

## 2021-07-20 RX ORDER — MONTELUKAST SODIUM 10 MG/1
10 TABLET ORAL
COMMUNITY
Start: 2020-08-14 | End: 2022-01-11

## 2021-07-20 ASSESSMENT — ASTHMA QUESTIONNAIRES
QUESTION_2 LAST FOUR WEEKS HOW OFTEN HAVE YOU HAD SHORTNESS OF BREATH: ONCE OR TWICE A WEEK
QUESTION_1 LAST FOUR WEEKS HOW MUCH OF THE TIME DID YOUR ASTHMA KEEP YOU FROM GETTING AS MUCH DONE AT WORK, SCHOOL OR AT HOME: NONE OF THE TIME
QUESTION_3 LAST FOUR WEEKS HOW OFTEN DID YOUR ASTHMA SYMPTOMS (WHEEZING, COUGHING, SHORTNESS OF BREATH, CHEST TIGHTNESS OR PAIN) WAKE YOU UP AT NIGHT OR EARLIER THAN USUAL IN THE MORNING: NOT AT ALL
QUESTION_4 LAST FOUR WEEKS HOW OFTEN HAVE YOU USED YOUR RESCUE INHALER OR NEBULIZER MEDICATION (SUCH AS ALBUTEROL): ONCE A WEEK OR LESS
QUESTION_5 LAST FOUR WEEKS HOW WOULD YOU RATE YOUR ASTHMA CONTROL: WELL CONTROLLED
ACT_TOTALSCORE: 22

## 2021-07-20 ASSESSMENT — MIFFLIN-ST. JEOR: SCORE: 1771.62

## 2021-07-20 NOTE — PROGRESS NOTES
"        Subjective       HPI chief complaint: Allergies    History of present illness: This is a pleasant 30-year-old woman I was asked to see for evaluation of allergies by Dr. Carlin.  Patient states she had allergies for many years.  She has symptoms throughout the year but feels that her symptoms worsen during the spring, summer and fall.  Symptoms consist of itchy, watery eyes, sneezing and congestion as well as drainage.  She had swollen eyes this year for the first time.  She does have a history of asthma which is triggered by her allergies.  She currently takes Advair 100-51 puff twice daily.  She states that her insurance would not cover the higher dose and she feels that her asthma was better controlled on the higher dose.  She rarely needs to use her albuterol inhaler.  She does use Zyrtec and montelukast and was recently prescribed fluticasone nasal spray which she has not had a chance yet to use.  She is never been allergy tested would like to consider allergy shots.    Past medical history: Appendectomy    Social history: She works as a [employer] , has a dog, non-smoking , lives in a home with central air and a basement    Family history: Mom dad brother and grandparents with asthma, dad brother and grandmother with allergies        Review of Systems   Constitutional, HEENT, cardiovascular, pulmonary, GI, , musculoskeletal, neuro, skin, endocrine and psych systems are negative, except as otherwise noted.      Objective    Pulse 82   Ht 1.664 m (5' 5.5\")   Wt 104.8 kg (231 lb)   SpO2 99%   BMI 37.86 kg/m    Body mass index is 37.86 kg/m .  Physical Exam      Gen: Pleasant female not in acute distress  HEENT: Eyes no erythema of the bulbar or palpebral conjunctiva, no edema. Ears: TMs well visualized, no effusions. Nose: congestion, mucosa normal. Mouth: Throat drainage , no lip or tongue edema.   Cardiac: Regular rate and rhythm, no murmurs, rubs or " gallops  Respiratory: Clear to auscultation bilaterally, no adventitious breath sounds  Lymph: No visible supraclavicular or cervical lymphadenopathy  Skin: No rashes or lesions  Psych: Alert and oriented times 3    30 percutaneous test were placed to the environmental skin test panel, positive histamine control with a positive test to cat dog dust mite grass and Alternaria.  See photograph scanned on date of service.    Impression report and plan:  1.  Allergic rhinoconjunctivitis  2.  Mild persistent asthma    I think increasing her Advair to 250-51 puff twice daily will be important.  Continue current medication regimen otherwise.  Reviewed environmental control.  I went over the risks and benefits of allergy shots.  I stated risks include hives, swelling, shortness of breath.  I did state that one in 2.5 million shot administrations can result in death.  I stated they must wait in the office for 30 minutes following the shot and carry an epinephrine device on the day of the shot.  I stated that shots are effective in about 90% of patients.  I stated that they should check with the insurance company prior to proceeding.  They understand the risks and benefits and will let me know if she would like to proceed.

## 2021-07-20 NOTE — LETTER
"    7/20/2021         RE: Melissa Frank  [address] [facility] [address]        Dear Colleague,    Thank you for referring your patient, Melissa Frank, to the [facility].  She would like to consider allergy shots.   Please see a copy of my visit note below.            Subjective       HPI chief complaint: Allergies    History of present illness: This is a pleasant 30-year-old woman I was asked to see for evaluation of allergies by Dr. Carlin.  Patient states she had allergies for many years.  She has symptoms throughout the year but feels that her symptoms worsen during the spring, summer and fall.  Symptoms consist of itchy, watery eyes, sneezing and congestion as well as drainage.  She had swollen eyes this year for the first time.  She does have a history of asthma which is triggered by her allergies.  She currently takes Advair 100-51 puff twice daily.  She states that her insurance would not cover the higher dose and she feels that her asthma was better controlled on the higher dose.  She rarely needs to use her albuterol inhaler.  She does use Zyrtec and montelukast and was recently prescribed fluticasone nasal spray which she has not had a chance yet to use.  She is never been allergy tested would like to consider allergy shots.    Past medical history: Appendectomy    Social history: She works as a mortgage cholecyst , has a dog, non-smoking , lives in a home with central air and a basement    Family history: Mom dad brother and grandparents with asthma, dad brother and grandmother with allergies        Review of Systems   Constitutional, HEENT, cardiovascular, pulmonary, GI, , musculoskeletal, neuro, skin, endocrine and psych systems are negative, except as otherwise noted.      Objective    Pulse 82   Ht 1.664 m (5' 5.5\")   Wt 104.8 kg (231 lb)   SpO2 99%   BMI 37.86 kg/m    Body mass index is 37.86 kg/m .  Physical Exam      Gen: " Pleasant female not in acute distress  HEENT: Eyes no erythema of the bulbar or palpebral conjunctiva, no edema. Ears: TMs well visualized, no effusions. Nose: congestion, mucosa normal. Mouth: Throat drainage , no lip or tongue edema.   Cardiac: Regular rate and rhythm, no murmurs, rubs or gallops  Respiratory: Clear to auscultation bilaterally, no adventitious breath sounds  Lymph: No visible supraclavicular or cervical lymphadenopathy  Skin: No rashes or lesions  Psych: Alert and oriented times 3    30 percutaneous test were placed to the environmental skin test panel, positive histamine control with a positive test to cat dog dust mite grass and Alternaria.  See photograph scanned on date of service.    Impression report and plan:  1.  Allergic rhinoconjunctivitis  2.  Mild persistent asthma    I think increasing her Advair to 250-51 puff twice daily will be important.  Continue current medication regimen otherwise.  Reviewed environmental control.  I went over the risks and benefits of allergy shots.  I stated risks include hives, swelling, shortness of breath.  I did state that one in 2.5 million shot administrations can result in death.  I stated they must wait in the office for 30 minutes following the shot and carry an epinephrine device on the day of the shot.  I stated that shots are effective in about 90% of patients.  I stated that they should check with the insurance company prior to proceeding.  They understand the risks and benefits and will let me know if she would like to proceed.        Again, thank you for allowing me to participate in the care of your patient.        Sincerely,        Isis JULES MD

## 2021-07-20 NOTE — PATIENT INSTRUCTIONS
Dust mite control    Wash bedding weekly, covers, keep humidity <50%    Air purifier    Keep dog well groomed    Consider allergy shots

## 2021-07-21 ASSESSMENT — ASTHMA QUESTIONNAIRES: ACT_TOTALSCORE: 22

## 2021-10-16 ENCOUNTER — HEALTH MAINTENANCE LETTER (OUTPATIENT)
Age: 31
End: 2021-10-16

## 2021-10-19 PROBLEM — F32.9 MAJOR DEPRESSION: Status: ACTIVE | Noted: 2021-05-24

## 2021-12-18 ENCOUNTER — E-VISIT (OUTPATIENT)
Dept: FAMILY MEDICINE | Facility: CLINIC | Age: 31
End: 2021-12-18
Payer: COMMERCIAL

## 2021-12-18 DIAGNOSIS — F41.9 ANXIETY: Primary | ICD-10-CM

## 2021-12-18 DIAGNOSIS — F33.0 MILD EPISODE OF RECURRENT MAJOR DEPRESSIVE DISORDER (H): ICD-10-CM

## 2021-12-18 PROCEDURE — 99421 OL DIG E/M SVC 5-10 MIN: CPT | Performed by: FAMILY MEDICINE

## 2021-12-18 ASSESSMENT — ANXIETY QUESTIONNAIRES
1. FEELING NERVOUS, ANXIOUS, OR ON EDGE: MORE THAN HALF THE DAYS
4. TROUBLE RELAXING: SEVERAL DAYS
3. WORRYING TOO MUCH ABOUT DIFFERENT THINGS: MORE THAN HALF THE DAYS
GAD7 TOTAL SCORE: 10
6. BECOMING EASILY ANNOYED OR IRRITABLE: SEVERAL DAYS
2. NOT BEING ABLE TO STOP OR CONTROL WORRYING: MORE THAN HALF THE DAYS
8. IF YOU CHECKED OFF ANY PROBLEMS, HOW DIFFICULT HAVE THESE MADE IT FOR YOU TO DO YOUR WORK, TAKE CARE OF THINGS AT HOME, OR GET ALONG WITH OTHER PEOPLE?: VERY DIFFICULT
7. FEELING AFRAID AS IF SOMETHING AWFUL MIGHT HAPPEN: SEVERAL DAYS
GAD7 TOTAL SCORE: 10
5. BEING SO RESTLESS THAT IT IS HARD TO SIT STILL: SEVERAL DAYS
7. FEELING AFRAID AS IF SOMETHING AWFUL MIGHT HAPPEN: SEVERAL DAYS
GAD7 TOTAL SCORE: 10

## 2021-12-18 ASSESSMENT — PATIENT HEALTH QUESTIONNAIRE - PHQ9
10. IF YOU CHECKED OFF ANY PROBLEMS, HOW DIFFICULT HAVE THESE PROBLEMS MADE IT FOR YOU TO DO YOUR WORK, TAKE CARE OF THINGS AT HOME, OR GET ALONG WITH OTHER PEOPLE: SOMEWHAT DIFFICULT
SUM OF ALL RESPONSES TO PHQ QUESTIONS 1-9: 12
SUM OF ALL RESPONSES TO PHQ QUESTIONS 1-9: 12

## 2021-12-19 ASSESSMENT — ANXIETY QUESTIONNAIRES: GAD7 TOTAL SCORE: 10

## 2021-12-19 ASSESSMENT — PATIENT HEALTH QUESTIONNAIRE - PHQ9: SUM OF ALL RESPONSES TO PHQ QUESTIONS 1-9: 12

## 2021-12-20 RX ORDER — BUSPIRONE HYDROCHLORIDE 5 MG/1
5 TABLET ORAL 3 TIMES DAILY PRN
Qty: 30 TABLET | Refills: 1 | Status: SHIPPED | OUTPATIENT
Start: 2021-12-20 | End: 2022-01-11

## 2021-12-20 NOTE — PROGRESS NOTES
Impression: Combined forms of age-related cataract, bilateral Plan: No change since last visit. Vision excellent. Do not recommend cat sx at this time. Please schedule her for in person visit with me in a month.  Same day okay.

## 2021-12-28 ENCOUNTER — MYC MEDICAL ADVICE (OUTPATIENT)
Dept: FAMILY MEDICINE | Facility: CLINIC | Age: 31
End: 2021-12-28
Payer: COMMERCIAL

## 2021-12-28 DIAGNOSIS — J45.30 MILD PERSISTENT ASTHMA WITHOUT COMPLICATION: ICD-10-CM

## 2022-01-11 ENCOUNTER — OFFICE VISIT (OUTPATIENT)
Dept: FAMILY MEDICINE | Facility: CLINIC | Age: 32
End: 2022-01-11
Payer: COMMERCIAL

## 2022-01-11 VITALS
SYSTOLIC BLOOD PRESSURE: 134 MMHG | WEIGHT: 242 LBS | HEIGHT: 66 IN | BODY MASS INDEX: 38.89 KG/M2 | RESPIRATION RATE: 20 BRPM | DIASTOLIC BLOOD PRESSURE: 69 MMHG | HEART RATE: 81 BPM

## 2022-01-11 DIAGNOSIS — E66.812 CLASS 2 SEVERE OBESITY DUE TO EXCESS CALORIES WITH SERIOUS COMORBIDITY AND BODY MASS INDEX (BMI) OF 39.0 TO 39.9 IN ADULT (H): ICD-10-CM

## 2022-01-11 DIAGNOSIS — E66.01 CLASS 2 SEVERE OBESITY DUE TO EXCESS CALORIES WITH SERIOUS COMORBIDITY AND BODY MASS INDEX (BMI) OF 39.0 TO 39.9 IN ADULT (H): ICD-10-CM

## 2022-01-11 DIAGNOSIS — F41.9 ANXIETY: ICD-10-CM

## 2022-01-11 DIAGNOSIS — F33.0 MILD EPISODE OF RECURRENT MAJOR DEPRESSIVE DISORDER (H): Primary | ICD-10-CM

## 2022-01-11 PROCEDURE — 99214 OFFICE O/P EST MOD 30 MIN: CPT | Performed by: FAMILY MEDICINE

## 2022-01-11 RX ORDER — HYDROXYZINE HYDROCHLORIDE 10 MG/1
10 TABLET, FILM COATED ORAL 3 TIMES DAILY PRN
Qty: 30 TABLET | Refills: 1 | Status: SHIPPED | OUTPATIENT
Start: 2022-01-11 | End: 2022-09-20

## 2022-01-11 RX ORDER — FLUOXETINE 10 MG/1
CAPSULE ORAL
Qty: 42 CAPSULE | Refills: 1 | Status: SHIPPED | OUTPATIENT
Start: 2022-01-11 | End: 2022-02-22 | Stop reason: DRUGHIGH

## 2022-01-11 ASSESSMENT — PATIENT HEALTH QUESTIONNAIRE - PHQ9
5. POOR APPETITE OR OVEREATING: SEVERAL DAYS
SUM OF ALL RESPONSES TO PHQ QUESTIONS 1-9: 11

## 2022-01-11 ASSESSMENT — MIFFLIN-ST. JEOR: SCORE: 1816.51

## 2022-01-11 ASSESSMENT — ANXIETY QUESTIONNAIRES
7. FEELING AFRAID AS IF SOMETHING AWFUL MIGHT HAPPEN: SEVERAL DAYS
3. WORRYING TOO MUCH ABOUT DIFFERENT THINGS: NEARLY EVERY DAY
5. BEING SO RESTLESS THAT IT IS HARD TO SIT STILL: NOT AT ALL
6. BECOMING EASILY ANNOYED OR IRRITABLE: SEVERAL DAYS
GAD7 TOTAL SCORE: 11
1. FEELING NERVOUS, ANXIOUS, OR ON EDGE: NEARLY EVERY DAY
2. NOT BEING ABLE TO STOP OR CONTROL WORRYING: MORE THAN HALF THE DAYS

## 2022-01-11 NOTE — PROGRESS NOTES
"  Assessment & Plan     Mild episode of recurrent major depressive disorder (H)  Discussed a plan to taper off sertraline and onto fluoxetine.  Discussed that this is more weight neutral.  Can taper to better effects as well, her PHQ-9 and CHANTE-7 scores are above goal range currently.  Plan recheck mood in 4 to 6 weeks, can be virtual.  - FLUoxetine (PROZAC) 10 MG capsule; Take 1 capsule daily for 2 weeks,then 2 capsules daily thereafter    Anxiety  Changing from sertraline to fluoxetine.  Also recommended hydroxyzine as needed for anxiety in place of BuSpar.  - FLUoxetine (PROZAC) 10 MG capsule; Take 1 capsule daily for 2 weeks,then 2 capsules daily thereafter  - sertraline (ZOLOFT) 50 MG tablet; Take 1/2 tab daily for 2 weeks, then stop  - hydrOXYzine (ATARAX) 10 MG tablet; Take 1 tablet (10 mg) by mouth 3 times daily as needed for anxiety    Class 2 severe obesity due to excess calories with serious comorbidity and body mass index (BMI) of 39.0 to 39.9 in adult (H)  Discussed healthy, vegetable rich diet and regular cardiovascular exercise.  We also discussed individualized weight loss program with bariatrician here at the [facility].  Discussed getting patient's mood under better control prior to focusing on weight loss.       BMI:   Estimated body mass index is 39.66 kg/m  as calculated from the following:    Height as of this encounter: 1.664 m (5' 5.5\").    Weight as of this encounter: 109.8 kg (242 lb).   Weight management plan: Discussed healthy diet and exercise guidelines        Return in about 4 weeks (around 2/8/2022) for Follow up mood, using a video visit.    Nadeen Andino MD  [facility]    Bobby Torres is a 31 year old who presents for the following health issues     HPI     Patient presents today primarily for a discussion of her mood.  She thinks that her weight and her mood tie together quite a bit.  She is looking to establish care with a new " "primary provider as she did not really appreciate how her weight was approached with previous encounters.  In terms of her mood, her PHQ-9 score today is 11 with primary symptoms involving fatigue.  Her CHANTE-7 score is also 11 with primary symptoms including feeling anxious and excessive worry.  She is currently taking 50 mg of sertraline along with BuSpar 5 mg 3 times daily as needed for anxiety.  When she tried higher doses of sertraline, she had some side effects.  She has been seeing a therapist, approximately twice monthly.  She does find this helpful.  She felt foggy and lethargic with higher doses of sertraline.  She takes BuSpar maybe 1-2 times weekly, at most 2 doses per day.  She is unsure if this helps.  She thinks her mood symptoms started around age 17 or 18, she states she has \"always been somewhat anxious\".  She is currently working at a [employer] company and really does not like her job, this causes some additional stress.  She is also going to school online.  She is  and lives with her .  She admits that she has not been getting any regular exercise currently due to lack of motivation.  She works full-time, 9 AM to 5 PM.  She has no additional questions or concerns today.    Review of Systems   Constitutional, HEENT, cardiovascular, pulmonary, gi and gu systems are negative, except as otherwise noted.      Objective    /69 (BP Location: Left arm, Patient Position: Sitting, Cuff Size: Adult Large)   Pulse 81   Resp 20   Ht 1.664 m (5' 5.5\")   Wt 109.8 kg (242 lb)   LMP 12/18/2021   Breastfeeding No   BMI 39.66 kg/m    Body mass index is 39.66 kg/m .  Physical Exam   GENERAL: healthy, alert and no distress  NECK: no adenopathy, no asymmetry, masses, or scars and thyroid normal to palpation  RESP: lungs clear to auscultation - no rales, rhonchi or wheezes  CV: regular rate and rhythm, normal S1 S2, no S3 or S4, no murmur, click or rub, no peripheral edema and peripheral pulses " strong  MS: no gross musculoskeletal defects noted, no edema  NEURO: Normal strength and tone, mentation intact and speech normal  PSYCH: mentation appears normal, affect anxious and at times tearful    Diagnostics: None

## 2022-01-12 ASSESSMENT — ANXIETY QUESTIONNAIRES: GAD7 TOTAL SCORE: 11

## 2022-01-20 PROBLEM — E66.812 CLASS 2 SEVERE OBESITY DUE TO EXCESS CALORIES WITH SERIOUS COMORBIDITY AND BODY MASS INDEX (BMI) OF 39.0 TO 39.9 IN ADULT (H): Status: ACTIVE | Noted: 2021-05-24

## 2022-01-20 PROBLEM — E66.01 CLASS 2 SEVERE OBESITY DUE TO EXCESS CALORIES WITH SERIOUS COMORBIDITY AND BODY MASS INDEX (BMI) OF 39.0 TO 39.9 IN ADULT (H): Status: ACTIVE | Noted: 2021-05-24

## 2022-01-23 DIAGNOSIS — F41.9 ANXIETY: ICD-10-CM

## 2022-01-23 DIAGNOSIS — F33.0 MILD EPISODE OF RECURRENT MAJOR DEPRESSIVE DISORDER (H): ICD-10-CM

## 2022-01-25 RX ORDER — FLUOXETINE 10 MG/1
CAPSULE ORAL
Qty: 14 CAPSULE | OUTPATIENT
Start: 2022-01-25

## 2022-02-22 ENCOUNTER — VIRTUAL VISIT (OUTPATIENT)
Dept: FAMILY MEDICINE | Facility: CLINIC | Age: 32
End: 2022-02-22
Payer: COMMERCIAL

## 2022-02-22 DIAGNOSIS — E66.01 CLASS 2 SEVERE OBESITY DUE TO EXCESS CALORIES WITH SERIOUS COMORBIDITY AND BODY MASS INDEX (BMI) OF 39.0 TO 39.9 IN ADULT (H): ICD-10-CM

## 2022-02-22 DIAGNOSIS — E66.812 CLASS 2 SEVERE OBESITY DUE TO EXCESS CALORIES WITH SERIOUS COMORBIDITY AND BODY MASS INDEX (BMI) OF 39.0 TO 39.9 IN ADULT (H): ICD-10-CM

## 2022-02-22 DIAGNOSIS — F33.0 MILD EPISODE OF RECURRENT MAJOR DEPRESSIVE DISORDER (H): Primary | ICD-10-CM

## 2022-02-22 DIAGNOSIS — F41.9 ANXIETY: ICD-10-CM

## 2022-02-22 DIAGNOSIS — J45.40 MODERATE PERSISTENT ASTHMA WITHOUT COMPLICATION: ICD-10-CM

## 2022-02-22 PROCEDURE — 99214 OFFICE O/P EST MOD 30 MIN: CPT | Mod: 95 | Performed by: FAMILY MEDICINE

## 2022-02-22 RX ORDER — ALBUTEROL SULFATE 90 UG/1
AEROSOL, METERED RESPIRATORY (INHALATION)
Qty: 18 G | Refills: 3 | Status: SHIPPED | OUTPATIENT
Start: 2022-02-22 | End: 2022-09-20

## 2022-02-22 ASSESSMENT — ANXIETY QUESTIONNAIRES
6. BECOMING EASILY ANNOYED OR IRRITABLE: SEVERAL DAYS
5. BEING SO RESTLESS THAT IT IS HARD TO SIT STILL: NOT AT ALL
GAD7 TOTAL SCORE: 7
2. NOT BEING ABLE TO STOP OR CONTROL WORRYING: SEVERAL DAYS
GAD7 TOTAL SCORE: 7
7. FEELING AFRAID AS IF SOMETHING AWFUL MIGHT HAPPEN: SEVERAL DAYS
3. WORRYING TOO MUCH ABOUT DIFFERENT THINGS: MORE THAN HALF THE DAYS
7. FEELING AFRAID AS IF SOMETHING AWFUL MIGHT HAPPEN: SEVERAL DAYS
GAD7 TOTAL SCORE: 7
4. TROUBLE RELAXING: SEVERAL DAYS
1. FEELING NERVOUS, ANXIOUS, OR ON EDGE: SEVERAL DAYS

## 2022-02-22 ASSESSMENT — PATIENT HEALTH QUESTIONNAIRE - PHQ9
SUM OF ALL RESPONSES TO PHQ QUESTIONS 1-9: 12
10. IF YOU CHECKED OFF ANY PROBLEMS, HOW DIFFICULT HAVE THESE PROBLEMS MADE IT FOR YOU TO DO YOUR WORK, TAKE CARE OF THINGS AT HOME, OR GET ALONG WITH OTHER PEOPLE: SOMEWHAT DIFFICULT
SUM OF ALL RESPONSES TO PHQ QUESTIONS 1-9: 12

## 2022-02-22 NOTE — PROGRESS NOTES
Melissa is a 31 year old who is being evaluated via a billable video visit.      How would you like to obtain your AVS? MyChart  If the video visit is dropped, the invitation should be resent by: Text to cell phone: [phone]  Will anyone else be joining your video visit? No      Video Start Time: 10:52 AM    Assessment & Plan     Mild episode of recurrent major depressive disorder (H)  Patient perceives her depression to be improved, although her score actually worsened by one-point.  She states that she is under a lot of stress recently with changes as described below.  Because these changes are unrelated to medication, recommended that she continue seeing her therapist and resolve some of her work stress, possibly finding a new job.  We will hold steady on her current dose of fluoxetine for now given some minor side effects.  Plan reassess in 3 months at a complete physical exam whether we can augment with Wellbutrin at that time or hold steady.  She is welcome to follow-up sooner if needed.    Anxiety  Anxiety score has improved with change from sertraline to fluoxetine.  She has only taken hydroxyzine once but found this effective.  We will make no further changes as above for now, reassess in 3 months at the latest.    Class 2 severe obesity due to excess calories with serious comorbidity and body mass index (BMI) of 39.0 to 39.9 in adult (H)  Patient has been working on increasing some exercise, getting on her treadmill for about 10 minutes daily.  Encouraged these efforts, and discussed that this should help with energy level and sleep as well.  Can also help with anxiety.    Moderate persistent asthma without complication  Patient needs refills of her inhalers as below.  Sent her an updated asthma control test as well.  She feels that her asthma is under good control.  - fluticasone-salmeterol (ADVAIR DISKUS) 250-50 MCG/DOSE inhaler; INHALE 1 PUFF INTO THE LUNGS EVERY 12 HOURS  - albuterol (PROAIR  HFA/PROVENTIL HFA/VENTOLIN HFA) 108 (90 Base) MCG/ACT inhaler; [ALBUTEROL (PROAIR HFA;PROVENTIL HFA;VENTOLIN HFA) 90 MCG/ACTUATION INHALER] 1-2 puffs every 4-6 hours, as needed for wheezing.                 Return in about 3 months (around 5/22/2022) for Routine preventive.    Nadeen Andino MD  [facility]    Bobby Torres is a 31 year old who presents for the following health issues     History of Present Illness       Mental Health Follow-up:  Patient presents to follow-up on Depression & Anxiety.Patient's depression since last visit has been:  Better  The patient is having other symptoms associated with depression.  Patient's anxiety since last visit has been:  Better  The patient is having other symptoms associated with anxiety.  Any significant life events: job concerns  Patient is feeling anxious or having panic attacks.  Patient has no concerns about alcohol or drug use.     Social History  Tobacco Use    Smoking status: Never Smoker    Smokeless tobacco: Never Used  Alcohol use: Yes  Drug use: Not Currently      Today's PHQ-9         PHQ-9 Total Score:     (P) 12   PHQ-9 Q9 Thoughts of better off dead/self-harm past 2 weeks :   (P) Not at all   Thoughts of suicide or self harm:      Self-harm Plan:        Self-harm Action:          Safety concerns for self or others:           She eats 0-1 servings of fruits and vegetables daily.She consumes 0 sweetened beverage(s) daily.She exercises with enough effort to increase her heart rate 10 to 19 minutes per day.  She exercises with enough effort to increase her heart rate 3 or less days per week.   She is taking medications regularly.     Patient presents for virtual visit today in follow-up from her last visit, 1/11/2022 to discuss mood.  At that time, we initiated a transition from sertraline to fluoxetine and decided to try hydroxyzine as needed for anxiety in place of BuSpar.  Patient overall thinks that she has been feeling  "a bit better.  Interestingly, her PHQ-9 score actually increased by one-point, but her CHANTE-7 score decreased by four points.  She thinks she is having a bit more trouble sleeping than previous when she was taking sertraline.  This is primarily trouble staying asleep.  She also got a new puppy over the past couple of weeks, which has been disrupting her routine a bit.  She really dislikes her job, which causes quite a bit of stress.  She currently sees her therapist two times per month, was thinking of cutting back to one time per month.  The only side effect otherwise that she has noticed from fluoxetine has been dizzy spells.  These can happen at random, only once were more bothersome where she felt that she should not drive.  Next, she requires refills of her inhaled medications.  She feels that her asthma has been under good control.  She increased her Advair last summer after having had Covid.  She is due for an asthma control test.    Review of Systems   Constitutional, HEENT, cardiovascular, pulmonary, gi and gu systems are negative, except as otherwise noted.      Objective    Vitals - Patient Reported  Weight (Patient Reported): 107.5 kg (237 lb)  Height (Patient Reported): 165.1 cm (5' 5\")  BMI (Based on Pt Reported Ht/Wt): 39.44        Physical Exam   GENERAL: Healthy, alert and no distress  EYES: Eyes grossly normal to inspection.  No discharge or erythema, or obvious scleral/conjunctival abnormalities.  RESP: No audible wheeze, cough, or visible cyanosis.  No visible retractions or increased work of breathing.    SKIN: Visible skin clear. No significant rash, abnormal pigmentation or lesions.  NEURO: Cranial nerves grossly intact.  Mentation and speech appropriate for age.  PSYCH: Mentation appears normal, affect normal/bright, judgement and insight intact, normal speech and appearance well-groomed.    Diagnostics: None        Video-Visit Details    Type of service:  Video Visit    Video End Time:11:09 " AM    Originating Location (pt. Location): Home    Distant Location (provider location):  [facility]     Platform used for Video Visit: [employer]    Answers for HPI/ROS submitted by the patient on 2/22/2022  If you checked off any problems, how difficult have these problems made it for you to do your work, take care of things at home, or get along with other people?: Somewhat difficult  PHQ9 TOTAL SCORE: 12  CHANTE 7 TOTAL SCORE: 7  Depression/Anxiety: Depression & Anxiety  Status since last visit:: better  Anxiety since last: : better  Other associated symptoms of depression:: Yes  Other associated symotome: : Yes  Significant life event: : job concerns  Anxious:: Yes  Current substance use:: No  How many servings of fruits and vegetables do you eat daily?: 0-1  On average, how many sweetened beverages do you drink each day (Examples: soda, juice, sweet tea, etc.  Do NOT count diet or artificially sweetened beverages)?: 0  How many minutes a day do you exercise enough to make your heart beat faster?: 10 to 19  How many days a week do you exercise enough to make your heart beat faster?: 3 or less  How many days per week do you miss taking your medication?: 0

## 2022-02-23 ASSESSMENT — ANXIETY QUESTIONNAIRES: GAD7 TOTAL SCORE: 7

## 2022-02-23 ASSESSMENT — PATIENT HEALTH QUESTIONNAIRE - PHQ9: SUM OF ALL RESPONSES TO PHQ QUESTIONS 1-9: 12

## 2022-03-28 ENCOUNTER — VIRTUAL VISIT (OUTPATIENT)
Dept: ALLERGY | Facility: CLINIC | Age: 32
End: 2022-03-28
Payer: COMMERCIAL

## 2022-03-28 DIAGNOSIS — J30.1 SEASONAL ALLERGIC RHINITIS DUE TO POLLEN: ICD-10-CM

## 2022-03-28 DIAGNOSIS — J30.89 ALLERGIC RHINITIS CAUSED BY MOLD: Primary | ICD-10-CM

## 2022-03-28 DIAGNOSIS — J30.89 ALLERGIC RHINITIS DUE TO DUST MITE: ICD-10-CM

## 2022-03-28 PROCEDURE — 99214 OFFICE O/P EST MOD 30 MIN: CPT | Mod: 95 | Performed by: ALLERGY & IMMUNOLOGY

## 2022-03-28 PROCEDURE — 95165 ANTIGEN THERAPY SERVICES: CPT | Performed by: ALLERGY & IMMUNOLOGY

## 2022-03-28 RX ORDER — EPINEPHRINE 0.3 MG/.3ML
INJECTION SUBCUTANEOUS
Qty: 2 EACH | Refills: 0 | Status: SHIPPED | OUTPATIENT
Start: 2022-03-28

## 2022-03-28 ASSESSMENT — ASTHMA QUESTIONNAIRES
QUESTION_2 LAST FOUR WEEKS HOW OFTEN HAVE YOU HAD SHORTNESS OF BREATH: NOT AT ALL
QUESTION_5 LAST FOUR WEEKS HOW WOULD YOU RATE YOUR ASTHMA CONTROL: COMPLETELY CONTROLLED
QUESTION_1 LAST FOUR WEEKS HOW MUCH OF THE TIME DID YOUR ASTHMA KEEP YOU FROM GETTING AS MUCH DONE AT WORK, SCHOOL OR AT HOME: NONE OF THE TIME
ACT_TOTALSCORE: 25
QUESTION_4 LAST FOUR WEEKS HOW OFTEN HAVE YOU USED YOUR RESCUE INHALER OR NEBULIZER MEDICATION (SUCH AS ALBUTEROL): NOT AT ALL
ACT_TOTALSCORE: 25
QUESTION_3 LAST FOUR WEEKS HOW OFTEN DID YOUR ASTHMA SYMPTOMS (WHEEZING, COUGHING, SHORTNESS OF BREATH, CHEST TIGHTNESS OR PAIN) WAKE YOU UP AT NIGHT OR EARLIER THAN USUAL IN THE MORNING: NOT AT ALL

## 2022-03-28 NOTE — PROGRESS NOTES
Telephone consent obtained with Dr. Lagos for starting allergen immunotherapy treatment and initial serum order + billing. See scanned document.    Noel Morrow RN

## 2022-03-28 NOTE — PROGRESS NOTES
Melissa is a 31 year old who is being evaluated via a billable video visit.      How would you like to obtain your AVS? MyChart  If the video visit is dropped, the invitation should be resent by:   Will anyone else be joining your video visit? No      Video Start Time: 124        Subjective   Melissa is a 31 year old who presents for the following health issues     HPI     Chief complaint: Asthma and allergy follow-up    History of present illness: This is a pleasant 31-year-old woman who is here today for follow-up of her allergies and asthma.  I last saw her in July of last year.  She would like to start allergy shots.  She reports her asthma is been well controlled.  Advair 250/50 1 puff twice daily seems to be controlling her symptoms.  Rare need for albuterol inhaler.  She continues on montelukast as well.  Patient had a physical copy of asthma control test and ACT score today was 25.    Review of Systems   Constitutional, HEENT, cardiovascular, pulmonary, gi and gu systems are negative, except as otherwise noted.      Objective           Vitals:  No vitals were obtained today due to virtual visit.    Physical Exam   GENERAL: Healthy, alert and no distress  EYES: Eyes grossly normal to inspection.  No discharge or erythema, or obvious scleral/conjunctival abnormalities.  RESP: No audible wheeze, cough, or visible cyanosis.  No visible retractions or increased work of breathing.    SKIN: Visible skin clear. No significant rash, abnormal pigmentation or lesions.  NEURO: Cranial nerves grossly intact.  Mentation and speech appropriate for age.  PSYCH: Mentation appears normal, affect normal/bright, judgement and insight intact, normal speech and appearance well-groomed.        Impression report and plan:   1.  Allergic asthma     2.  Allergic rhinitis    Continue Advair 250/50 1 puff twice daily.  Continue montelukast.  Continue Zyrtec. I went over the risks and benefits of allergy shots.  I stated risks include  hives, swelling, shortness of breath.  I did state that one in 2.5 million shot administrations can result in death.  I stated they must wait in the office for 30 minutes following the shot and carry an epinephrine device on the day of the shot.  I stated that shots are effective in about 90% of patients.  I stated that they should check with the insurance company prior to proceeding.  They understand the risks and benefits and agreed to proceed. Telephone consent obtained with RN witness, please see scanned photograph.         Time spent with patient, chart review and documentation, 30 minutes on date of service.    Video-Visit Details    Type of service:  Video Visit    Video End Time:138    Originating Location (pt. Location): Home    Distant Location (provider location):  [facility]     Platform used for Video Visit: [employer]

## 2022-03-28 NOTE — LETTER
3/28/2022         RE: Melissa Salinas  [address] [facility] [address]        Dear Colleague,    Thank you for referring your patient, Melissa Salinas, to the [facility]. Please see a copy of my visit note below.    Melissa is a 31 year old who is being evaluated via a billable video visit.      How would you like to obtain your AVS? MyChart  If the video visit is dropped, the invitation should be resent by:   Will anyone else be joining your video visit? No      Video Start Time: 124        Subjective   Melissa is a 31 year old who presents for the following health issues     HPI     Chief complaint: Asthma and allergy follow-up    History of present illness: This is a pleasant 31-year-old woman who is here today for follow-up of her allergies and asthma.  I last saw her in July of last year.  She would like to start allergy shots.  She reports her asthma is been well controlled.  Advair 250/50 1 puff twice daily seems to be controlling her symptoms.  Rare need for albuterol inhaler.  She continues on montelukast as well.  Patient had a physical copy of asthma control test and ACT score today was 25.    Review of Systems   Constitutional, HEENT, cardiovascular, pulmonary, gi and gu systems are negative, except as otherwise noted.      Objective           Vitals:  No vitals were obtained today due to virtual visit.    Physical Exam   GENERAL: Healthy, alert and no distress  EYES: Eyes grossly normal to inspection.  No discharge or erythema, or obvious scleral/conjunctival abnormalities.  RESP: No audible wheeze, cough, or visible cyanosis.  No visible retractions or increased work of breathing.    SKIN: Visible skin clear. No significant rash, abnormal pigmentation or lesions.  NEURO: Cranial nerves grossly intact.  Mentation and speech appropriate for age.  PSYCH: Mentation appears normal, affect normal/bright, judgement and insight intact, normal speech and appearance  well-groomed.        Impression report and plan:   1.  Allergic asthma     2.  Allergic rhinitis    Continue Advair 250/50 1 puff twice daily.  Continue montelukast.  Continue Zyrtec. I went over the risks and benefits of allergy shots.  I stated risks include hives, swelling, shortness of breath.  I did state that one in 2.5 million shot administrations can result in death.  I stated they must wait in the office for 30 minutes following the shot and carry an epinephrine device on the day of the shot.  I stated that shots are effective in about 90% of patients.  I stated that they should check with the insurance company prior to proceeding.  They understand the risks and benefits and agreed to proceed. Telephone consent obtained with RN witness, please see scanned photograph.         Time spent with patient, chart review and documentation, 30 minutes on date of service.    Video-Visit Details    Type of service:  Video Visit    Video End Time:138    Originating Location (pt. Location): Home    Distant Location (provider location):  [facility]     Platform used for Video Visit: [employer]    Telephone consent obtained with Dr. Lagos for starting allergen immunotherapy treatment and initial serum order + billing. See scanned document.    Noel Morrow RN          Again, thank you for allowing me to participate in the care of your patient.        Sincerely,        Isis LAGOS MD

## 2022-04-08 NOTE — PROGRESS NOTES
ALT/DFM/DPM/APDOG  1:1000 V/V EXP 6/5/2022  1:100 V/V EXP 8/5/2022  1:10 V/V EXP 4/5/2023  1:1 V/V EXP 4/5/2023    CHECKED BY SF  CHARGED 30 UNITS

## 2022-04-11 DIAGNOSIS — J30.89 ALLERGIC RHINITIS CAUSED BY MOLD: Primary | ICD-10-CM

## 2022-04-11 DIAGNOSIS — J30.81 ALLERGIC RHINITIS DUE TO ANIMALS: ICD-10-CM

## 2022-04-11 DIAGNOSIS — J30.89 ALLERGIC RHINITIS DUE TO DUST MITE: ICD-10-CM

## 2022-04-11 DIAGNOSIS — J30.1 SEASONAL ALLERGIC RHINITIS DUE TO POLLEN: ICD-10-CM

## 2022-04-11 PROCEDURE — 95165 ANTIGEN THERAPY SERVICES: CPT | Performed by: ALLERGY & IMMUNOLOGY

## 2022-04-11 NOTE — PROGRESS NOTES
GRASS/CAT  1:1000 V/V EXP 6/7/2022  1:100 V/V EXP 8/7/2022  1:10 V/V EXP 4/7/2023  1:1 V/V EXP 4/7/2023    CHECKED BY SF  CHARGED 30 UNITS

## 2022-04-12 ENCOUNTER — MEDICAL CORRESPONDENCE (OUTPATIENT)
Dept: HEALTH INFORMATION MANAGEMENT | Facility: CLINIC | Age: 32
End: 2022-04-12
Payer: COMMERCIAL

## 2022-04-21 ENCOUNTER — ALLIED HEALTH/NURSE VISIT (OUTPATIENT)
Dept: ALLERGY | Facility: CLINIC | Age: 32
End: 2022-04-21
Payer: COMMERCIAL

## 2022-04-21 DIAGNOSIS — J30.89 ALLERGIC RHINITIS CAUSED BY MOLD: Primary | ICD-10-CM

## 2022-04-21 PROCEDURE — 95117 IMMUNOTHERAPY INJECTIONS: CPT

## 2022-05-05 ENCOUNTER — ALLIED HEALTH/NURSE VISIT (OUTPATIENT)
Dept: ALLERGY | Facility: CLINIC | Age: 32
End: 2022-05-05
Payer: COMMERCIAL

## 2022-05-05 DIAGNOSIS — J30.89 ALLERGIC RHINITIS DUE TO DUST MITE: ICD-10-CM

## 2022-05-05 DIAGNOSIS — J30.89 ALLERGIC RHINITIS CAUSED BY MOLD: Primary | ICD-10-CM

## 2022-05-05 PROCEDURE — 95117 IMMUNOTHERAPY INJECTIONS: CPT

## 2022-05-12 ENCOUNTER — ALLIED HEALTH/NURSE VISIT (OUTPATIENT)
Dept: ALLERGY | Facility: CLINIC | Age: 32
End: 2022-05-12
Payer: COMMERCIAL

## 2022-05-12 DIAGNOSIS — J30.89 ALLERGIC RHINITIS DUE TO DUST MITE: ICD-10-CM

## 2022-05-12 DIAGNOSIS — J30.89 ALLERGIC RHINITIS CAUSED BY MOLD: Primary | ICD-10-CM

## 2022-05-12 DIAGNOSIS — J30.1 SEASONAL ALLERGIC RHINITIS DUE TO POLLEN: ICD-10-CM

## 2022-05-12 DIAGNOSIS — J30.81 ALLERGIC RHINITIS DUE TO ANIMALS: ICD-10-CM

## 2022-05-12 PROCEDURE — 95117 IMMUNOTHERAPY INJECTIONS: CPT

## 2022-05-26 ENCOUNTER — ALLIED HEALTH/NURSE VISIT (OUTPATIENT)
Dept: ALLERGY | Facility: CLINIC | Age: 32
End: 2022-05-26
Payer: COMMERCIAL

## 2022-05-26 DIAGNOSIS — J30.89 ALLERGIC RHINITIS CAUSED BY MOLD: Primary | ICD-10-CM

## 2022-05-26 PROCEDURE — 95117 IMMUNOTHERAPY INJECTIONS: CPT

## 2022-05-26 PROCEDURE — 99207 PR DROP WITH A PROCEDURE: CPT

## 2022-06-02 ENCOUNTER — ALLIED HEALTH/NURSE VISIT (OUTPATIENT)
Dept: ALLERGY | Facility: CLINIC | Age: 32
End: 2022-06-02
Payer: COMMERCIAL

## 2022-06-02 DIAGNOSIS — J30.89 ALLERGIC RHINITIS DUE TO DUST MITE: ICD-10-CM

## 2022-06-02 DIAGNOSIS — J30.89 ALLERGIC RHINITIS CAUSED BY MOLD: Primary | ICD-10-CM

## 2022-06-02 PROCEDURE — 95117 IMMUNOTHERAPY INJECTIONS: CPT

## 2022-06-09 ENCOUNTER — OFFICE VISIT (OUTPATIENT)
Dept: ALLERGY | Facility: CLINIC | Age: 32
End: 2022-06-09
Payer: COMMERCIAL

## 2022-06-09 VITALS — RESPIRATION RATE: 16 BRPM | OXYGEN SATURATION: 98 % | HEART RATE: 85 BPM

## 2022-06-09 DIAGNOSIS — J30.89 ALLERGIC RHINITIS DUE TO DUST MITE: ICD-10-CM

## 2022-06-09 DIAGNOSIS — J45.40 MODERATE PERSISTENT ASTHMA WITHOUT COMPLICATION: ICD-10-CM

## 2022-06-09 DIAGNOSIS — J30.89 ALLERGIC RHINITIS CAUSED BY MOLD: Primary | ICD-10-CM

## 2022-06-09 DIAGNOSIS — J30.1 SEASONAL ALLERGIC RHINITIS DUE TO POLLEN: ICD-10-CM

## 2022-06-09 DIAGNOSIS — J30.81 ALLERGIC RHINITIS DUE TO ANIMALS: ICD-10-CM

## 2022-06-09 PROCEDURE — 99212 OFFICE O/P EST SF 10 MIN: CPT | Mod: 25 | Performed by: ALLERGY & IMMUNOLOGY

## 2022-06-09 PROCEDURE — 95117 IMMUNOTHERAPY INJECTIONS: CPT | Performed by: ALLERGY & IMMUNOLOGY

## 2022-06-09 ASSESSMENT — ASTHMA QUESTIONNAIRES
QUESTION_1 LAST FOUR WEEKS HOW MUCH OF THE TIME DID YOUR ASTHMA KEEP YOU FROM GETTING AS MUCH DONE AT WORK, SCHOOL OR AT HOME: NONE OF THE TIME
QUESTION_3 LAST FOUR WEEKS HOW OFTEN DID YOUR ASTHMA SYMPTOMS (WHEEZING, COUGHING, SHORTNESS OF BREATH, CHEST TIGHTNESS OR PAIN) WAKE YOU UP AT NIGHT OR EARLIER THAN USUAL IN THE MORNING: NOT AT ALL
ACUTE_EXACERBATION_TODAY: NO
QUESTION_5 LAST FOUR WEEKS HOW WOULD YOU RATE YOUR ASTHMA CONTROL: WELL CONTROLLED
QUESTION_2 LAST FOUR WEEKS HOW OFTEN HAVE YOU HAD SHORTNESS OF BREATH: ONCE OR TWICE A WEEK
QUESTION_4 LAST FOUR WEEKS HOW OFTEN HAVE YOU USED YOUR RESCUE INHALER OR NEBULIZER MEDICATION (SUCH AS ALBUTEROL): NOT AT ALL
ACT_TOTALSCORE: 23
ACT_TOTALSCORE: 23

## 2022-06-09 NOTE — PROGRESS NOTES
Subjective       HPI     Chief complaint: Follow-up allergies    History of present illness: This is a pleasant 31-year-old woman who is here today for follow-up of allergies.  She will be moving into her blue vial on allergy shots.  She has a history of asthma as well as allergic rhinitis.  Asthma is been well controlled.  ACT score today is 23.  She denies any cough, wheeze or shortness of breath.  She has noted some increased nasal congestion over the last month but she states it is sporadic days and overall it is manageable.  No systemic reactions.    Review of Systems         Objective    Pulse 85   Resp 16   SpO2 98%   There is no height or weight on file to calculate BMI.  Physical Exam      Gen: Pleasant female not in acute distress  HEENT: Eyes no erythema of the bulbar or palpebral conjunctiva, no edema.   Respiratory: Clear to auscultation bilaterally, no adventitious breath sounds    Skin: No rashes or lesions  Psych: Alert and oriented times 3    Impression report and plan:  1.  Allergic rhinitis  2.  Allergic asthma    Continue allergy shots.  Continue current medication therapy.  Notify of systemic reaction.  Follow in 6 weeks.

## 2022-06-16 ENCOUNTER — ALLIED HEALTH/NURSE VISIT (OUTPATIENT)
Dept: ALLERGY | Facility: CLINIC | Age: 32
End: 2022-06-16
Payer: COMMERCIAL

## 2022-06-16 DIAGNOSIS — J30.89 ALLERGIC RHINITIS DUE TO DUST MITE: ICD-10-CM

## 2022-06-16 DIAGNOSIS — J30.89 ALLERGIC RHINITIS CAUSED BY MOLD: Primary | ICD-10-CM

## 2022-06-16 PROCEDURE — 95117 IMMUNOTHERAPY INJECTIONS: CPT

## 2022-06-23 ENCOUNTER — ALLIED HEALTH/NURSE VISIT (OUTPATIENT)
Dept: ALLERGY | Facility: CLINIC | Age: 32
End: 2022-06-23
Payer: COMMERCIAL

## 2022-06-23 DIAGNOSIS — J30.81 ALLERGIC RHINITIS DUE TO ANIMALS: ICD-10-CM

## 2022-06-23 DIAGNOSIS — J30.89 ALLERGIC RHINITIS CAUSED BY MOLD: Primary | ICD-10-CM

## 2022-06-23 DIAGNOSIS — J30.1 SEASONAL ALLERGIC RHINITIS DUE TO POLLEN: ICD-10-CM

## 2022-06-23 DIAGNOSIS — J30.89 ALLERGIC RHINITIS DUE TO DUST MITE: ICD-10-CM

## 2022-06-23 PROCEDURE — 95117 IMMUNOTHERAPY INJECTIONS: CPT

## 2022-07-07 ENCOUNTER — ALLIED HEALTH/NURSE VISIT (OUTPATIENT)
Dept: ALLERGY | Facility: CLINIC | Age: 32
End: 2022-07-07
Payer: COMMERCIAL

## 2022-07-07 ENCOUNTER — MYC MEDICAL ADVICE (OUTPATIENT)
Dept: FAMILY MEDICINE | Facility: CLINIC | Age: 32
End: 2022-07-07

## 2022-07-07 DIAGNOSIS — J30.89 ALLERGIC RHINITIS CAUSED BY MOLD: Primary | ICD-10-CM

## 2022-07-07 DIAGNOSIS — Z30.9 ENCOUNTER FOR CONTRACEPTIVE MANAGEMENT, UNSPECIFIED TYPE: ICD-10-CM

## 2022-07-07 PROCEDURE — 95117 IMMUNOTHERAPY INJECTIONS: CPT

## 2022-07-07 PROCEDURE — 99207 PR DROP WITH A PROCEDURE: CPT

## 2022-07-07 RX ORDER — LEVONORGESTREL AND ETHINYL ESTRADIOL 0.15-0.03
KIT ORAL
Qty: 84 TABLET | Refills: 3 | OUTPATIENT
Start: 2022-07-07

## 2022-07-07 RX ORDER — LEVONORGESTREL AND ETHINYL ESTRADIOL 0.15-0.03
1 KIT ORAL DAILY
Qty: 84 TABLET | Refills: 0 | Status: SHIPPED | OUTPATIENT
Start: 2022-07-07 | End: 2022-09-20

## 2022-07-12 DIAGNOSIS — J45.40 MODERATE PERSISTENT ASTHMA WITHOUT COMPLICATION: ICD-10-CM

## 2022-07-12 RX ORDER — FLUTICASONE PROPIONATE AND SALMETEROL 250; 50 UG/1; UG/1
POWDER RESPIRATORY (INHALATION)
Qty: 60 EACH | Refills: 0 | Status: SHIPPED | OUTPATIENT
Start: 2022-07-12 | End: 2022-08-15

## 2022-07-12 NOTE — TELEPHONE ENCOUNTER
"Last Written Prescription Date:  5/9/22  Last Fill Quantity: 60,  # refills: 1   Last office visit provider:  2/22/22     Requested Prescriptions   Pending Prescriptions Disp Refills     fluticasone-salmeterol (ADVAIR DISKUS) 250-50 MCG/ACT inhaler [Pharmacy Med Name: ADVAIR DISKUS 250/50MCG (YELLOW) 60]       Sig: INHALE 1 PUFF INTO THE LUNGS EVERY 12 HOURS       Inhaled Steroids Protocol Passed - 7/12/2022  7:08 AM        Passed - Patient is age 12 or older        Passed - Asthma control assessment score within normal limits in last 6 months     Please review ACT score.           Passed - Medication is active on med list        Passed - Recent (6 mo) or future (30 days) visit within the authorizing provider's specialty     Patient had office visit in the last 6 months or has a visit in the next 30 days with authorizing provider or within the authorizing provider's specialty.  See \"Patient Info\" tab in inbasket, or \"Choose Columns\" in Meds & Orders section of the refill encounter.           Long-Acting Beta Agonist Inhalers Protocol  Passed - 7/12/2022  7:08 AM        Passed - Patient is age 12 or older        Passed - Asthma control assessment score within normal limits in last 6 months     Please review ACT score.           Passed - Order for Serevent, Striverdi, or Foradil and pt has steroid inhaler        Passed - Medication is active on med list        Passed - Recent (6 mo) or future (30 days) visit within the authorizing provider's specialty     Patient had office visit in the last 6 months or has a visit in the next 30 days with authorizing provider or within the authorizing provider's specialty.  See \"Patient Info\" tab in inbasket, or \"Choose Columns\" in Meds & Orders section of the refill encounter.                 Gerri Soto RN 07/12/22 6:55 PM  "

## 2022-07-14 ENCOUNTER — ALLIED HEALTH/NURSE VISIT (OUTPATIENT)
Dept: ALLERGY | Facility: CLINIC | Age: 32
End: 2022-07-14
Payer: COMMERCIAL

## 2022-07-14 DIAGNOSIS — J30.89 ALLERGIC RHINITIS CAUSED BY MOLD: Primary | ICD-10-CM

## 2022-07-14 PROCEDURE — 95117 IMMUNOTHERAPY INJECTIONS: CPT

## 2022-07-14 PROCEDURE — 99207 PR DROP WITH A PROCEDURE: CPT

## 2022-07-23 ENCOUNTER — HEALTH MAINTENANCE LETTER (OUTPATIENT)
Age: 32
End: 2022-07-23

## 2022-07-28 ENCOUNTER — OFFICE VISIT (OUTPATIENT)
Dept: ALLERGY | Facility: CLINIC | Age: 32
End: 2022-07-28
Payer: COMMERCIAL

## 2022-07-28 VITALS — RESPIRATION RATE: 16 BRPM | HEART RATE: 89 BPM | OXYGEN SATURATION: 98 %

## 2022-07-28 DIAGNOSIS — J30.1 SEASONAL ALLERGIC RHINITIS DUE TO POLLEN: ICD-10-CM

## 2022-07-28 DIAGNOSIS — J30.81 ALLERGIC RHINITIS DUE TO ANIMALS: ICD-10-CM

## 2022-07-28 DIAGNOSIS — J30.89 ALLERGIC RHINITIS DUE TO DUST MITE: ICD-10-CM

## 2022-07-28 DIAGNOSIS — J30.89 ALLERGIC RHINITIS CAUSED BY MOLD: Primary | ICD-10-CM

## 2022-07-28 PROCEDURE — 99213 OFFICE O/P EST LOW 20 MIN: CPT | Mod: 25 | Performed by: ALLERGY & IMMUNOLOGY

## 2022-07-28 PROCEDURE — 95117 IMMUNOTHERAPY INJECTIONS: CPT | Performed by: ALLERGY & IMMUNOLOGY

## 2022-07-28 NOTE — LETTER
7/28/2022         RE: Melissa Salinas  [address] [facility] [address]        Dear Colleague,    Thank you for referring your patient, Melissa Salinas, to the [facility]. Please see a copy of my visit note below.          Subjective   Melissa is a 31 year old, presenting for the following health issues:  Allergy Injection and RECHECK      HPI     Chief complaint: Follow-up allergies    History of present illness: This is a pleasant 31-year-old woman with a history of allergic rhinitis and asthma here today for follow-up visit.  She is currently undergoing allergen immunotherapy moving into her yellow vial.  She notes site reactions but no systemic reactions.  She continues on Advair 250-50 -51 puff twice daily.  With this her asthma is been well controlled.  She feels that her allergy and asthma symptoms are improving with more manageable symptoms and less intense symptoms.  Denies any cough, wheeze or shortness of breath today.    Review of Systems         Objective    Pulse 89   Resp 16   SpO2 98%   There is no height or weight on file to calculate BMI.  Physical Exam   Gen: Pleasant female not in acute distress  HEENT: Eyes no erythema of the bulbar or palpebral conjunctiva, no edema.Nose: No congestionMouth: Throat clear,     Respiratory: Clear to auscultation bilaterally, no adventitious breath sounds    Skin: No rashes or lesions  Psych: Alert and oriented times 3    Impression report and plan:  1.  Allergic rhinitis  2.  Mild persistent asthma    Continue allergy shots.  Continue current medication therapy.  Notify of systemic reaction.  Follow in 6 weeks.            .  ..      Again, thank you for allowing me to participate in the care of your patient.        Sincerely,        Isis JULES MD

## 2022-07-28 NOTE — PROGRESS NOTES
Bobby Torres is a 31 year old, presenting for the following health issues:  Allergy Injection and RECHECK      HPI     Chief complaint: Follow-up allergies    History of present illness: This is a pleasant 31-year-old woman with a history of allergic rhinitis and asthma here today for follow-up visit.  She is currently undergoing allergen immunotherapy moving into her yellow vial.  She notes site reactions but no systemic reactions.  She continues on Advair 250-50 -51 puff twice daily.  With this her asthma is been well controlled.  She feels that her allergy and asthma symptoms are improving with more manageable symptoms and less intense symptoms.  Denies any cough, wheeze or shortness of breath today.    Review of Systems         Objective    Pulse 89   Resp 16   SpO2 98%   There is no height or weight on file to calculate BMI.  Physical Exam   Gen: Pleasant female not in acute distress  HEENT: Eyes no erythema of the bulbar or palpebral conjunctiva, no edema.Nose: No congestionMouth: Throat clear,     Respiratory: Clear to auscultation bilaterally, no adventitious breath sounds    Skin: No rashes or lesions  Psych: Alert and oriented times 3    Impression report and plan:  1.  Allergic rhinitis  2.  Mild persistent asthma    Continue allergy shots.  Continue current medication therapy.  Notify of systemic reaction.  Follow in 6 weeks.            .  ..

## 2022-08-04 ENCOUNTER — ALLIED HEALTH/NURSE VISIT (OUTPATIENT)
Dept: ALLERGY | Facility: CLINIC | Age: 32
End: 2022-08-04
Payer: COMMERCIAL

## 2022-08-04 DIAGNOSIS — J30.89 ALLERGIC RHINITIS CAUSED BY MOLD: Primary | ICD-10-CM

## 2022-08-04 PROCEDURE — 95117 IMMUNOTHERAPY INJECTIONS: CPT

## 2022-08-11 ENCOUNTER — ALLIED HEALTH/NURSE VISIT (OUTPATIENT)
Dept: ALLERGY | Facility: CLINIC | Age: 32
End: 2022-08-11
Payer: COMMERCIAL

## 2022-08-11 DIAGNOSIS — J30.89 ALLERGIC RHINITIS CAUSED BY MOLD: Primary | ICD-10-CM

## 2022-08-11 DIAGNOSIS — J30.81 ALLERGIC RHINITIS DUE TO ANIMALS: ICD-10-CM

## 2022-08-11 DIAGNOSIS — J30.89 ALLERGIC RHINITIS DUE TO DUST MITE: ICD-10-CM

## 2022-08-11 PROCEDURE — 95117 IMMUNOTHERAPY INJECTIONS: CPT

## 2022-08-18 ENCOUNTER — ALLIED HEALTH/NURSE VISIT (OUTPATIENT)
Dept: ALLERGY | Facility: CLINIC | Age: 32
End: 2022-08-18
Payer: COMMERCIAL

## 2022-08-18 DIAGNOSIS — J30.89 ALLERGIC RHINITIS DUE TO DUST MITE: ICD-10-CM

## 2022-08-18 DIAGNOSIS — J30.89 ALLERGIC RHINITIS CAUSED BY MOLD: Primary | ICD-10-CM

## 2022-08-18 DIAGNOSIS — J30.81 ALLERGIC RHINITIS DUE TO ANIMALS: ICD-10-CM

## 2022-08-18 DIAGNOSIS — J30.1 SEASONAL ALLERGIC RHINITIS DUE TO POLLEN: ICD-10-CM

## 2022-08-18 PROCEDURE — 95117 IMMUNOTHERAPY INJECTIONS: CPT

## 2022-08-25 ENCOUNTER — ALLIED HEALTH/NURSE VISIT (OUTPATIENT)
Dept: ALLERGY | Facility: CLINIC | Age: 32
End: 2022-08-25
Payer: COMMERCIAL

## 2022-08-25 DIAGNOSIS — J30.89 ALLERGIC RHINITIS CAUSED BY MOLD: Primary | ICD-10-CM

## 2022-08-25 DIAGNOSIS — J30.89 ALLERGIC RHINITIS DUE TO DUST MITE: ICD-10-CM

## 2022-08-25 DIAGNOSIS — J30.81 ALLERGIC RHINITIS DUE TO ANIMALS: ICD-10-CM

## 2022-08-25 DIAGNOSIS — J30.1 SEASONAL ALLERGIC RHINITIS DUE TO POLLEN: ICD-10-CM

## 2022-08-25 PROCEDURE — 95117 IMMUNOTHERAPY INJECTIONS: CPT

## 2022-09-01 ENCOUNTER — ALLIED HEALTH/NURSE VISIT (OUTPATIENT)
Dept: ALLERGY | Facility: CLINIC | Age: 32
End: 2022-09-01
Payer: COMMERCIAL

## 2022-09-01 DIAGNOSIS — J30.81 ALLERGIC RHINITIS DUE TO ANIMALS: ICD-10-CM

## 2022-09-01 DIAGNOSIS — J30.89 ALLERGIC RHINITIS CAUSED BY MOLD: Primary | ICD-10-CM

## 2022-09-01 DIAGNOSIS — J30.89 ALLERGIC RHINITIS DUE TO DUST MITE: ICD-10-CM

## 2022-09-01 PROCEDURE — 99207 PR DROP WITH A PROCEDURE: CPT

## 2022-09-01 PROCEDURE — 95117 IMMUNOTHERAPY INJECTIONS: CPT

## 2022-09-08 ENCOUNTER — OFFICE VISIT (OUTPATIENT)
Dept: ALLERGY | Facility: CLINIC | Age: 32
End: 2022-09-08
Payer: COMMERCIAL

## 2022-09-08 VITALS — WEIGHT: 240 LBS | OXYGEN SATURATION: 99 % | HEART RATE: 77 BPM | BODY MASS INDEX: 39.33 KG/M2

## 2022-09-08 DIAGNOSIS — J30.89 ALLERGIC RHINITIS DUE TO AMERICAN HOUSE DUST MITE: ICD-10-CM

## 2022-09-08 DIAGNOSIS — J30.81 ALLERGIC RHINITIS DUE TO ANIMALS: ICD-10-CM

## 2022-09-08 DIAGNOSIS — J45.30 MILD PERSISTENT ASTHMA WITHOUT COMPLICATION: ICD-10-CM

## 2022-09-08 DIAGNOSIS — J30.1 SEASONAL ALLERGIC RHINITIS DUE TO POLLEN: ICD-10-CM

## 2022-09-08 DIAGNOSIS — J30.89 ALLERGIC RHINITIS CAUSED BY MOLD: Primary | ICD-10-CM

## 2022-09-08 PROCEDURE — 95117 IMMUNOTHERAPY INJECTIONS: CPT | Performed by: ALLERGY & IMMUNOLOGY

## 2022-09-08 PROCEDURE — 99213 OFFICE O/P EST LOW 20 MIN: CPT | Mod: 25 | Performed by: ALLERGY & IMMUNOLOGY

## 2022-09-08 ASSESSMENT — ASTHMA QUESTIONNAIRES
QUESTION_2 LAST FOUR WEEKS HOW OFTEN HAVE YOU HAD SHORTNESS OF BREATH: NOT AT ALL
QUESTION_4 LAST FOUR WEEKS HOW OFTEN HAVE YOU USED YOUR RESCUE INHALER OR NEBULIZER MEDICATION (SUCH AS ALBUTEROL): NOT AT ALL
QUESTION_5 LAST FOUR WEEKS HOW WOULD YOU RATE YOUR ASTHMA CONTROL: WELL CONTROLLED
QUESTION_1 LAST FOUR WEEKS HOW MUCH OF THE TIME DID YOUR ASTHMA KEEP YOU FROM GETTING AS MUCH DONE AT WORK, SCHOOL OR AT HOME: NONE OF THE TIME
QUESTION_3 LAST FOUR WEEKS HOW OFTEN DID YOUR ASTHMA SYMPTOMS (WHEEZING, COUGHING, SHORTNESS OF BREATH, CHEST TIGHTNESS OR PAIN) WAKE YOU UP AT NIGHT OR EARLIER THAN USUAL IN THE MORNING: NOT AT ALL
ACT_TOTALSCORE: 24
ACT_TOTALSCORE: 24

## 2022-09-08 NOTE — LETTER
9/8/2022         RE: Melissa Salinas  [address] [facility] [address]        Dear Colleague,    Thank you for referring your patient, Melissa Salinas, to the [facility]. Please see a copy of my visit note below.        Subjective   Melissa is a 31 year old, presenting for the following health issues:  RECHECK (Into red vials)      HPI     Chief complaint: Follow-up allergies    History of present illness: This is a pleasant 31-year-old woman with a history of asthma and allergies here today for follow-up visit.  She is moving into her red vial.  No systemic reactions with some site reactions.  She continues on daily Zyrtec, montelukast and Advair inhaler.  She takes Advair 200/51 puff twice daily.  With this, she is had no need for her albuterol inhaler.  She reports relatively little allergy symptoms.  She has maybe 1 day of allergy symptoms but otherwise symptoms are well controlled.  She has no other allergy concerns.  Denies any cough, wheeze or shortness of breath.    Review of Systems         Objective    Pulse 77   Wt 108.9 kg (240 lb)   SpO2 99%   BMI 39.33 kg/m    Body mass index is 39.33 kg/m .  Physical Exam     Gen: Pleasant female not in acute distress  HEENT: Eyes no erythema of the bulbar or palpebral conjunctiva, no edema.   Respiratory: Clear to auscultation bilaterally, no adventitious breath sounds    Skin: No rashes or lesions  Psych: Alert and oriented times 3    Impression report and plan:  1.  Mild persistent asthma  2.  Allergic rhinitis    Continue Advair 250/50 1 puff twice daily.  Patient mentioned that she is possibly interested in becoming pregnant next year.  I asked her to let us know immediately when this does happen and we will move her back a few doses.  Otherwise, continue allergy shots.  Notify of systemic reaction.  Follow in 6 months.          Again, thank you for allowing me to participate in the care of your patient.         Sincerely,        Isis JULES MD

## 2022-09-08 NOTE — PROGRESS NOTES
Bobby Torres is a 31 year old, presenting for the following health issues:  RECHECK (Into red vials)      HPI     Chief complaint: Follow-up allergies    History of present illness: This is a pleasant 31-year-old woman with a history of asthma and allergies here today for follow-up visit.  She is moving into her red vial.  No systemic reactions with some site reactions.  She continues on daily Zyrtec, montelukast and Advair inhaler.  She takes Advair 200/51 puff twice daily.  With this, she is had no need for her albuterol inhaler.  She reports relatively little allergy symptoms.  She has maybe 1 day of allergy symptoms but otherwise symptoms are well controlled.  She has no other allergy concerns.  Denies any cough, wheeze or shortness of breath.    Review of Systems         Objective    Pulse 77   Wt 108.9 kg (240 lb)   SpO2 99%   BMI 39.33 kg/m    Body mass index is 39.33 kg/m .  Physical Exam     Gen: Pleasant female not in acute distress  HEENT: Eyes no erythema of the bulbar or palpebral conjunctiva, no edema.   Respiratory: Clear to auscultation bilaterally, no adventitious breath sounds    Skin: No rashes or lesions  Psych: Alert and oriented times 3    Impression report and plan:  1.  Mild persistent asthma  2.  Allergic rhinitis    Continue Advair 250/50 1 puff twice daily.  Patient mentioned that she is possibly interested in becoming pregnant next year.  I asked her to let us know immediately when this does happen and we will move her back a few doses.  Otherwise, continue allergy shots.  Notify of systemic reaction.  Follow in 6 months.

## 2022-09-20 ENCOUNTER — OFFICE VISIT (OUTPATIENT)
Dept: FAMILY MEDICINE | Facility: CLINIC | Age: 32
End: 2022-09-20
Payer: COMMERCIAL

## 2022-09-20 VITALS
RESPIRATION RATE: 20 BRPM | SYSTOLIC BLOOD PRESSURE: 139 MMHG | DIASTOLIC BLOOD PRESSURE: 73 MMHG | HEART RATE: 87 BPM | WEIGHT: 242.2 LBS | BODY MASS INDEX: 38.92 KG/M2 | HEIGHT: 66 IN

## 2022-09-20 DIAGNOSIS — J30.2 SEASONAL ALLERGIES: ICD-10-CM

## 2022-09-20 DIAGNOSIS — J45.40 MODERATE PERSISTENT ASTHMA WITHOUT COMPLICATION: ICD-10-CM

## 2022-09-20 DIAGNOSIS — E66.812 CLASS 2 OBESITY DUE TO EXCESS CALORIES WITHOUT SERIOUS COMORBIDITY WITH BODY MASS INDEX (BMI) OF 39.0 TO 39.9 IN ADULT: ICD-10-CM

## 2022-09-20 DIAGNOSIS — F33.0 MILD EPISODE OF RECURRENT MAJOR DEPRESSIVE DISORDER (H): ICD-10-CM

## 2022-09-20 DIAGNOSIS — F41.9 ANXIETY: ICD-10-CM

## 2022-09-20 DIAGNOSIS — E66.09 CLASS 2 OBESITY DUE TO EXCESS CALORIES WITHOUT SERIOUS COMORBIDITY WITH BODY MASS INDEX (BMI) OF 39.0 TO 39.9 IN ADULT: ICD-10-CM

## 2022-09-20 DIAGNOSIS — Z00.00 ROUTINE GENERAL MEDICAL EXAMINATION AT A HEALTH CARE FACILITY: Primary | ICD-10-CM

## 2022-09-20 DIAGNOSIS — Z30.9 ENCOUNTER FOR CONTRACEPTIVE MANAGEMENT, UNSPECIFIED TYPE: ICD-10-CM

## 2022-09-20 PROBLEM — J45.30 MILD PERSISTENT ASTHMA WITHOUT COMPLICATION: Status: RESOLVED | Noted: 2022-09-08 | Resolved: 2022-09-20

## 2022-09-20 PROCEDURE — 99395 PREV VISIT EST AGE 18-39: CPT | Performed by: FAMILY MEDICINE

## 2022-09-20 PROCEDURE — 99214 OFFICE O/P EST MOD 30 MIN: CPT | Mod: 25 | Performed by: FAMILY MEDICINE

## 2022-09-20 RX ORDER — FLUTICASONE PROPIONATE AND SALMETEROL 250; 50 UG/1; UG/1
POWDER RESPIRATORY (INHALATION)
Qty: 60 EACH | Refills: 11 | Status: SHIPPED | OUTPATIENT
Start: 2022-09-20

## 2022-09-20 RX ORDER — ALBUTEROL SULFATE 90 UG/1
AEROSOL, METERED RESPIRATORY (INHALATION)
Qty: 18 G | Refills: 3 | Status: SHIPPED | OUTPATIENT
Start: 2022-09-20

## 2022-09-20 RX ORDER — MONTELUKAST SODIUM 10 MG/1
1 TABLET ORAL DAILY
Qty: 90 TABLET | Refills: 3 | Status: SHIPPED | OUTPATIENT
Start: 2022-09-20 | End: 2023-09-11

## 2022-09-20 RX ORDER — FLUOXETINE 10 MG/1
10 CAPSULE ORAL DAILY
Qty: 90 CAPSULE | Refills: 1 | Status: SHIPPED | OUTPATIENT
Start: 2022-09-20 | End: 2023-02-16

## 2022-09-20 RX ORDER — LEVONORGESTREL AND ETHINYL ESTRADIOL 0.15-0.03
1 KIT ORAL DAILY
Qty: 84 TABLET | Refills: 0 | Status: SHIPPED | OUTPATIENT
Start: 2022-09-20 | End: 2022-12-15

## 2022-09-20 RX ORDER — HYDROXYZINE HYDROCHLORIDE 10 MG/1
10 TABLET, FILM COATED ORAL 3 TIMES DAILY PRN
Qty: 30 TABLET | Refills: 1 | Status: SHIPPED | OUTPATIENT
Start: 2022-09-20

## 2022-09-20 ASSESSMENT — ENCOUNTER SYMPTOMS
FEVER: 0
MYALGIAS: 0
PARESTHESIAS: 0
JOINT SWELLING: 0
ABDOMINAL PAIN: 0
NAUSEA: 0
BREAST MASS: 0
HEARTBURN: 0
EYE PAIN: 0
SORE THROAT: 0
DIARRHEA: 0
DIZZINESS: 0
ARTHRALGIAS: 0
NERVOUS/ANXIOUS: 0
CHILLS: 0
DYSURIA: 0
HEMATURIA: 0
FREQUENCY: 0
HEADACHES: 0
HEMATOCHEZIA: 0
COUGH: 0
PALPITATIONS: 0
CONSTIPATION: 0
SHORTNESS OF BREATH: 0
WEAKNESS: 0

## 2022-09-20 ASSESSMENT — ASTHMA QUESTIONNAIRES
QUESTION_5 LAST FOUR WEEKS HOW WOULD YOU RATE YOUR ASTHMA CONTROL: WELL CONTROLLED
QUESTION_3 LAST FOUR WEEKS HOW OFTEN DID YOUR ASTHMA SYMPTOMS (WHEEZING, COUGHING, SHORTNESS OF BREATH, CHEST TIGHTNESS OR PAIN) WAKE YOU UP AT NIGHT OR EARLIER THAN USUAL IN THE MORNING: NOT AT ALL
ACT_TOTALSCORE: 22
QUESTION_1 LAST FOUR WEEKS HOW MUCH OF THE TIME DID YOUR ASTHMA KEEP YOU FROM GETTING AS MUCH DONE AT WORK, SCHOOL OR AT HOME: NONE OF THE TIME
ACT_TOTALSCORE: 22
QUESTION_4 LAST FOUR WEEKS HOW OFTEN HAVE YOU USED YOUR RESCUE INHALER OR NEBULIZER MEDICATION (SUCH AS ALBUTEROL): ONCE A WEEK OR LESS
QUESTION_2 LAST FOUR WEEKS HOW OFTEN HAVE YOU HAD SHORTNESS OF BREATH: ONCE OR TWICE A WEEK

## 2022-09-20 ASSESSMENT — ANXIETY QUESTIONNAIRES
8. IF YOU CHECKED OFF ANY PROBLEMS, HOW DIFFICULT HAVE THESE MADE IT FOR YOU TO DO YOUR WORK, TAKE CARE OF THINGS AT HOME, OR GET ALONG WITH OTHER PEOPLE?: SOMEWHAT DIFFICULT
7. FEELING AFRAID AS IF SOMETHING AWFUL MIGHT HAPPEN: NOT AT ALL
4. TROUBLE RELAXING: SEVERAL DAYS
GAD7 TOTAL SCORE: 5
2. NOT BEING ABLE TO STOP OR CONTROL WORRYING: SEVERAL DAYS
6. BECOMING EASILY ANNOYED OR IRRITABLE: SEVERAL DAYS
GAD7 TOTAL SCORE: 5
GAD7 TOTAL SCORE: 5
7. FEELING AFRAID AS IF SOMETHING AWFUL MIGHT HAPPEN: NOT AT ALL
1. FEELING NERVOUS, ANXIOUS, OR ON EDGE: SEVERAL DAYS
IF YOU CHECKED OFF ANY PROBLEMS ON THIS QUESTIONNAIRE, HOW DIFFICULT HAVE THESE PROBLEMS MADE IT FOR YOU TO DO YOUR WORK, TAKE CARE OF THINGS AT HOME, OR GET ALONG WITH OTHER PEOPLE: SOMEWHAT DIFFICULT
5. BEING SO RESTLESS THAT IT IS HARD TO SIT STILL: NOT AT ALL
3. WORRYING TOO MUCH ABOUT DIFFERENT THINGS: SEVERAL DAYS

## 2022-09-20 ASSESSMENT — PATIENT HEALTH QUESTIONNAIRE - PHQ9
10. IF YOU CHECKED OFF ANY PROBLEMS, HOW DIFFICULT HAVE THESE PROBLEMS MADE IT FOR YOU TO DO YOUR WORK, TAKE CARE OF THINGS AT HOME, OR GET ALONG WITH OTHER PEOPLE: SOMEWHAT DIFFICULT
SUM OF ALL RESPONSES TO PHQ QUESTIONS 1-9: 5
SUM OF ALL RESPONSES TO PHQ QUESTIONS 1-9: 5

## 2022-09-20 NOTE — PROGRESS NOTES
SUBJECTIVE:   CC: Melissa is an 32 year old who presents for preventive health visit.     Patient has been advised of split billing requirements and indicates understanding: Yes     Healthy Habits:     Getting at least 3 servings of Calcium per day:  NO    Bi-annual eye exam:  Yes    Dental care twice a year:  Yes    Sleep apnea or symptoms of sleep apnea:  None    Diet:  Regular (no restrictions)    Frequency of exercise:  1 day/week    Duration of exercise:  Less than 15 minutes    Taking medications regularly:  Yes    Medication side effects:  None    PHQ-2 Total Score: 2    Additional concerns today:  No      Depression and Anxiety Follow-Up    How are you doing with your depression since your last visit? Improved     How are you doing with your anxiety since your last visit?  Improved     Are you having other symptoms that might be associated with depression or anxiety? No    Have you had a significant life event? Job Concerns-laid off over the summer but has started new job now    Do you have any concerns with your use of alcohol or other drugs? No    Social History     Tobacco Use     Smoking status: Never Smoker     Smokeless tobacco: Never Used   Substance Use Topics     Alcohol use: Yes     Drug use: Not Currently     PHQ 1/11/2022 2/22/2022 9/20/2022   PHQ-9 Total Score 11 12 5   Q9: Thoughts of better off dead/self-harm past 2 weeks Not at all Not at all Not at all     CHANTE-7 SCORE 1/11/2022 2/22/2022 9/20/2022   Total Score - 7 (mild anxiety) 5 (mild anxiety)   Total Score 11 7 5         Asthma Follow-Up  Was ACT completed today?    Yes    ACT Total Scores 9/20/2022   ACT TOTAL SCORE (Goal Greater than or Equal to 20) 22   In the past 12 months, how many times did you visit the emergency room for your asthma without being admitted to the hospital? 0   In the past 12 months, how many times were you hospitalized overnight because of your asthma? 0          How many days per week do you miss taking your  asthma controller medication?  0    Please describe any recent triggers for your asthma: dust mites, pollens, animal dander and exercise or sports    Have you had any Emergency Room Visits, Urgent Care Visits, or Hospital Admissions since your last office visit?  No    Abuse: Current or Past (Physical, Sexual or Emotional) - No  Do you feel safe in your environment? Yes    Have you ever done Advance Care Planning? (For example, a Health Directive, POLST, or a discussion with a medical provider or your loved ones about your wishes): No, advance care planning information given to patient to review.  Patient plans to discuss their wishes with loved ones or provider.      Social History     Tobacco Use     Smoking status: Never Smoker     Smokeless tobacco: Never Used   Substance Use Topics     Alcohol use: Yes       Alcohol Use 9/20/2022   Prescreen: >3 drinks/day or >7 drinks/week? No       Reviewed orders with patient.  Reviewed health maintenance and updated orders accordingly - Yes    BP Readings from Last 3 Encounters:   09/20/22 139/73   01/11/22 134/69   05/24/21 126/74    Wt Readings from Last 3 Encounters:   09/20/22 109.9 kg (242 lb 3.2 oz)   09/08/22 108.9 kg (240 lb)   01/11/22 109.8 kg (242 lb)                  Patient Active Problem List   Diagnosis     Anxiety     Dysmenorrhea     Iron deficiency anemia     Migraine headache     Seasonal allergies     Vitamin D deficiency     Contraceptive management - pills     Dermatitis     Mild major depression (H)     Class 2 severe obesity due to excess calories with serious comorbidity and body mass index (BMI) of 39.0 to 39.9 in adult (H)     Allergic rhinitis due to [employer] house dust mite     Allergic rhinitis due to animals     Seasonal allergic rhinitis due to pollen     Mild persistent asthma without complication     Past Surgical History:   Procedure Laterality Date     SD LAP,APPENDECTOMY N/A 07/18/2020    Procedure: APPENDECTOMY, LAPAROSCOPIC;  Surgeon:  Darion Trinidad MD;  Location: [facility] OR;  Service: General     WISDOM TOOTH EXTRACTION         Social History     Tobacco Use     Smoking status: Never Smoker     Smokeless tobacco: Never Used   Substance Use Topics     Alcohol use: Yes     Comment: 0-2/wk     Family History   Problem Relation Age of Onset     Hypertension Mother      Asthma Mother      Asthma Father      Other Cancer Father      Hypertension Maternal Grandmother      Breast Cancer Maternal Grandmother      Osteoporosis Maternal Grandmother      Mental Illness Paternal Grandmother      Leukemia Paternal Grandfather      Alzheimer Disease Paternal Grandfather      Asthma Brother      Breast Cancer Maternal Aunt         in her 40s     Ovarian Cancer Maternal Aunt         in her 70s     Colorectal Cancer No family hx of      Coronary Artery Disease No family hx of      Coronary Artery Disease Early Onset No family hx of      Diabetes No family hx of          Current Outpatient Medications   Medication Sig Dispense Refill     albuterol (PROAIR HFA/PROVENTIL HFA/VENTOLIN HFA) 108 (90 Base) MCG/ACT inhaler [ALBUTEROL (PROAIR HFA;PROVENTIL HFA;VENTOLIN HFA) 90 MCG/ACTUATION INHALER] 1-2 puffs every 4-6 hours, as needed for wheezing. 18 g 3     cetirizine (ZYRTEC) 10 MG tablet [CETIRIZINE (ZYRTEC) 10 MG TABLET] Take 10 mg by mouth daily.       EPINEPHrine (AUVI-Q) 0.3 MG/0.3ML injection 2-pack Inject into outer thigh for allergic reaction 2 each 0     FLUoxetine (PROZAC) 10 MG capsule Take 1 capsule (10 mg) by mouth daily 90 capsule 1     fluticasone-salmeterol (ADVAIR DISKUS) 250-50 MCG/ACT inhaler INHALE 1 PUFF INTO THE LUNGS EVERY 12 HOURS 60 each 11     hydrOXYzine (ATARAX) 10 MG tablet Take 1 tablet (10 mg) by mouth 3 times daily as needed for anxiety 30 tablet 1     levonorgestrel-ethinyl estradiol (KURVELO) 0.15-30 MG-MCG tablet Take 1 tablet by mouth daily 84 tablet 0     montelukast (SINGULAIR) 10 MG tablet Take 1 tablet (10 mg) by  mouth daily 90 tablet 3     ORDER FOR ALLERGEN IMMUNOTHERAPY Vaccine A MOLD/ INDOORALLERGENS/ RAGWEED  ALT Alternaria Tenuis 1:10 w/v, 0.5 ml  DFM Df Mite D farinae 10,000 AU, 0.5 ml  DPM Dp Mite D pteronyssinus. 10,000 AU, 0.5 ml  DOG AP Dog hair & dander, mixed breeds 1:10 w/v, 0.5 ml  Vaccine B POLLENS/ CAT  G GRASS MIX [place] Blue, [place], [place], Jesus 100, 000 BAU 0.3 ml  CAT Cat Hair 10,000 BAU 2.0 ml  Dilutions: None (red) Frequency: weekly  1/10 (yellow) Frequency: weekly  1/100 (blue) Frequency: weekly  1/1000 (green) Frequency: weekly      Diluent: HSA qs to 5ml 5 mL 1     No Known Allergies    Breast Cancer Screening:    FHS-7:   Breast CA Risk Assessment (FHS-7) 9/20/2022   Did any of your first-degree relatives have breast or ovarian cancer? Yes   Did any of your relatives have bilateral breast cancer? Unknown   Did any man in your family have breast cancer? No   Did any woman in your family have breast and ovarian cancer? No   Did any woman in your family have breast cancer before age 50 y? No   Do you have 2 or more relatives with breast and/or ovarian cancer? Yes   Do you have 2 or more relatives with breast and/or bowel cancer? Yes       History of abnormal Pap smear: NO - age 30-65 PAP every 5 years with negative HPV co-testing recommended  PAP / HPV Latest Ref Rng & Units 5/24/2021   PAP Negative for squamous intraepithelial lesion or malignancy. Negative for squamous intraepithelial lesion or malignancy  Electronically signed by Bethany Pizano CT (ASCP) on 6/2/2021 at 12:01 PM     HPV16 NEG Negative   HPV18 NEG Negative   HRHPV NEG Negative     Reviewed and updated as needed this visit by clinical staff   Tobacco  Allergies  Meds                Reviewed and updated as needed this visit by Provider   Tobacco  Allergies  Meds  Problems  Med Hx  Surg Hx  Fam Hx  Soc   Hx         Review of Systems   Constitutional: Negative for chills and fever.   HENT: Negative for  "congestion, ear pain, hearing loss and sore throat.    Eyes: Negative for pain and visual disturbance.   Respiratory: Negative for cough and shortness of breath.    Cardiovascular: Negative for chest pain, palpitations and peripheral edema.   Gastrointestinal: Negative for abdominal pain, constipation, diarrhea, heartburn, hematochezia and nausea.   Breasts:  Negative for tenderness, breast mass and discharge.   Genitourinary: Negative for dysuria, frequency, genital sores, hematuria, pelvic pain, urgency, vaginal bleeding and vaginal discharge.   Musculoskeletal: Negative for arthralgias, joint swelling and myalgias.   Skin: Negative for rash.   Neurological: Negative for dizziness, weakness, headaches and paresthesias.   Psychiatric/Behavioral: Negative for mood changes. The patient is not nervous/anxious.         OBJECTIVE:   /73 (BP Location: Left arm, Patient Position: Sitting, Cuff Size: Adult Large)   Pulse 87   Resp 20   Ht 1.668 m (5' 5.65\")   Wt 109.9 kg (242 lb 3.2 oz)   LMP 09/17/2022 (Exact Date)   BMI 39.51 kg/m    Physical Exam  GENERAL: healthy, alert and no distress  EYES: Eyes grossly normal to inspection, PERRL and conjunctivae and sclerae normal  HENT: ear canals and TM's normal, nose and mouth without ulcers or lesions  NECK: no adenopathy, no asymmetry, masses, or scars and thyroid normal to palpation  RESP: lungs clear to auscultation - no rales, rhonchi or wheezes  BREAST: normal without masses, tenderness or nipple discharge and no palpable axillary masses or adenopathy  CV: regular rate and rhythm, normal S1 S2, no S3 or S4, no murmur, click or rub, no peripheral edema and peripheral pulses strong  ABDOMEN: soft, nontender, no hepatosplenomegaly, no masses and bowel sounds normal  MS: no gross musculoskeletal defects noted, no edema  SKIN: no suspicious lesions or rashes  NEURO: Normal strength and tone, mentation intact and speech normal  PSYCH: mentation appears normal, affect " normal/bright    Diagnostic Test Results:  Labs reviewed in Epic  No results found for any visits on 09/20/22.    ASSESSMENT/PLAN:   1. Routine general medical examination at a health care facility  Routine history and physical, updated in EMR.  Fasting labs deferred for a future visit, were normal last year.  Patient plans a pregnancy in the near future, and so defers Tdap vaccine today.  She plans to get flu and COVID later this week with her partner.  Pap smear is up to date, will be due in 2026.  Plan repeat physical in 1 year.    2. Mild episode of recurrent major depressive disorder (H)  Patient reports that she is feeling well on her current dose of fluoxetine and does not desire any changes today.  Her PHQ-9 score remains minimally elevated.  We discussed strategies to improve energy with healthy diet and regular exercise.  - FLUoxetine (PROZAC) 10 MG capsule; Take 1 capsule (10 mg) by mouth daily  Dispense: 90 capsule; Refill: 1    3. Anxiety  Patient uses hydroxyzine very sparingly.  Refills provided today.  Overall feels that anxiety is under good control.  - hydrOXYzine (ATARAX) 10 MG tablet; Take 1 tablet (10 mg) by mouth 3 times daily as needed for anxiety  Dispense: 30 tablet; Refill: 1  - FLUoxetine (PROZAC) 10 MG capsule; Take 1 capsule (10 mg) by mouth daily  Dispense: 90 capsule; Refill: 1    4. Class 2 obesity due to excess calories without serious comorbidity and body mass index (BMI) of 39.0 to 39.9 in adult  Discussed a healthy, vegetable-rich diet and regular cardiovascular exercise.    5. Moderate persistent asthma without complication  ACT score is within goal range.  Lung exam is normal today.  Refills provided as below.  - albuterol (PROAIR HFA/PROVENTIL HFA/VENTOLIN HFA) 108 (90 Base) MCG/ACT inhaler; [ALBUTEROL (PROAIR HFA;PROVENTIL HFA;VENTOLIN HFA) 90 MCG/ACTUATION INHALER] 1-2 puffs every 4-6 hours, as needed for wheezing.  Dispense: 18 g; Refill: 3  - fluticasone-salmeterol (ADVAIR  "DISKUS) 250-50 MCG/ACT inhaler; INHALE 1 PUFF INTO THE LUNGS EVERY 12 HOURS  Dispense: 60 each; Refill: 11  - montelukast (SINGULAIR) 10 MG tablet; Take 1 tablet (10 mg) by mouth daily  Dispense: 90 tablet; Refill: 3    6. Seasonal allergies  Doing well on Singulair.  She may try off of this in early pregnancy if she wishes.  - montelukast (SINGULAIR) 10 MG tablet; Take 1 tablet (10 mg) by mouth daily  Dispense: 90 tablet; Refill: 3    7. Encounter for contraceptive management, unspecified type  Refills provided of OCP for now, patient desires pregnancy in the near future.  - levonorgestrel-ethinyl estradiol (KURVELO) 0.15-30 MG-MCG tablet; Take 1 tablet by mouth daily  Dispense: 84 tablet; Refill: 0      Patient has been advised of split billing requirements and indicates understanding: Yes    COUNSELING:  Reviewed preventive health counseling, as reflected in patient instructions  Special attention given to:        Regular exercise       Healthy diet/nutrition       Contraception       Family planning       Folic Acid    Estimated body mass index is 39.51 kg/m  as calculated from the following:    Height as of this encounter: 1.668 m (5' 5.65\").    Weight as of this encounter: 109.9 kg (242 lb 3.2 oz).    Weight management plan: Discussed healthy diet and exercise guidelines    She reports that she has never smoked. She has never used smokeless tobacco.      Counseling Resources:  ATP IV Guidelines  Pooled Cohorts Equation Calculator  Breast Cancer Risk Calculator  BRCA-Related Cancer Risk Assessment: FHS-7 Tool  FRAX Risk Assessment  ICSI Preventive Guidelines  Dietary Guidelines for Americans, 2010  USDA's MyPlate  ASA Prophylaxis  Lung CA Screening    Nadeen Andino MD  [facility]  "

## 2022-09-29 ENCOUNTER — ALLIED HEALTH/NURSE VISIT (OUTPATIENT)
Dept: ALLERGY | Facility: CLINIC | Age: 32
End: 2022-09-29
Payer: COMMERCIAL

## 2022-09-29 DIAGNOSIS — J30.89 ALLERGIC RHINITIS DUE TO AMERICAN HOUSE DUST MITE: Primary | ICD-10-CM

## 2022-09-29 PROCEDURE — 95117 IMMUNOTHERAPY INJECTIONS: CPT

## 2022-10-06 ENCOUNTER — ALLIED HEALTH/NURSE VISIT (OUTPATIENT)
Dept: ALLERGY | Facility: CLINIC | Age: 32
End: 2022-10-06
Payer: COMMERCIAL

## 2022-10-06 DIAGNOSIS — J30.89 ALLERGIC RHINITIS CAUSED BY MOLD: Primary | ICD-10-CM

## 2022-10-06 DIAGNOSIS — J30.1 SEASONAL ALLERGIC RHINITIS DUE TO POLLEN: ICD-10-CM

## 2022-10-06 DIAGNOSIS — J30.81 ALLERGIC RHINITIS DUE TO ANIMALS: ICD-10-CM

## 2022-10-06 PROCEDURE — 95117 IMMUNOTHERAPY INJECTIONS: CPT

## 2022-10-13 ENCOUNTER — ALLIED HEALTH/NURSE VISIT (OUTPATIENT)
Dept: ALLERGY | Facility: CLINIC | Age: 32
End: 2022-10-13
Payer: COMMERCIAL

## 2022-10-13 DIAGNOSIS — J30.1 SEASONAL ALLERGIC RHINITIS DUE TO POLLEN: ICD-10-CM

## 2022-10-13 DIAGNOSIS — J30.81 ALLERGIC RHINITIS DUE TO ANIMALS: ICD-10-CM

## 2022-10-13 DIAGNOSIS — J30.89 ALLERGIC RHINITIS CAUSED BY MOLD: Primary | ICD-10-CM

## 2022-10-13 PROCEDURE — 95117 IMMUNOTHERAPY INJECTIONS: CPT

## 2022-10-27 ENCOUNTER — ALLIED HEALTH/NURSE VISIT (OUTPATIENT)
Dept: ALLERGY | Facility: CLINIC | Age: 32
End: 2022-10-27
Payer: COMMERCIAL

## 2022-10-27 DIAGNOSIS — J30.89 ALLERGIC RHINITIS CAUSED BY MOLD: Primary | ICD-10-CM

## 2022-10-27 DIAGNOSIS — J30.81 ALLERGIC RHINITIS DUE TO ANIMALS: ICD-10-CM

## 2022-10-27 DIAGNOSIS — J30.1 SEASONAL ALLERGIC RHINITIS DUE TO POLLEN: ICD-10-CM

## 2022-10-27 PROCEDURE — 95117 IMMUNOTHERAPY INJECTIONS: CPT

## 2022-11-03 ENCOUNTER — ALLIED HEALTH/NURSE VISIT (OUTPATIENT)
Dept: ALLERGY | Facility: CLINIC | Age: 32
End: 2022-11-03
Payer: COMMERCIAL

## 2022-11-03 DIAGNOSIS — J30.89 ALLERGIC RHINITIS CAUSED BY MOLD: Primary | ICD-10-CM

## 2022-11-03 PROCEDURE — 99207 PR DROP WITH A PROCEDURE: CPT

## 2022-11-03 PROCEDURE — 95117 IMMUNOTHERAPY INJECTIONS: CPT

## 2022-11-03 NOTE — PROGRESS NOTES
Melissa LOPEZ Brad presents to clinic today at the request of Isis Lagos MD (ordering provider) for Allergy Immunotherapy injection(s).       This service provided today was under the care of Isis Lagos MD; the supervising provider of the day; who was available if needed.    Mirna Melara

## 2022-11-10 ENCOUNTER — ALLIED HEALTH/NURSE VISIT (OUTPATIENT)
Dept: ALLERGY | Facility: CLINIC | Age: 32
End: 2022-11-10
Payer: COMMERCIAL

## 2022-11-10 DIAGNOSIS — J30.81 ALLERGIC RHINITIS DUE TO ANIMALS: Primary | ICD-10-CM

## 2022-11-10 PROCEDURE — 95117 IMMUNOTHERAPY INJECTIONS: CPT

## 2022-11-17 ENCOUNTER — ALLIED HEALTH/NURSE VISIT (OUTPATIENT)
Dept: ALLERGY | Facility: CLINIC | Age: 32
End: 2022-11-17
Payer: COMMERCIAL

## 2022-11-17 DIAGNOSIS — J30.89 ALLERGIC RHINITIS CAUSED BY MOLD: ICD-10-CM

## 2022-11-17 DIAGNOSIS — J30.81 ALLERGIC RHINITIS DUE TO ANIMALS: Primary | ICD-10-CM

## 2022-11-17 DIAGNOSIS — J30.1 SEASONAL ALLERGIC RHINITIS DUE TO POLLEN: ICD-10-CM

## 2022-11-17 DIAGNOSIS — J30.89 ALLERGIC RHINITIS DUE TO AMERICAN HOUSE DUST MITE: ICD-10-CM

## 2022-11-17 PROCEDURE — 95117 IMMUNOTHERAPY INJECTIONS: CPT

## 2022-11-17 NOTE — PROGRESS NOTES
Melissa JOHN Gainesm presents to clinic today at the request of Isis Lagos MD (ordering provider) for Allergy Immunotherapy injection(s).       This service provided today was under the care of Isis Lagos MD; the supervising provider of the day; who was available if needed.    Car Patiño

## 2022-12-01 ENCOUNTER — ALLIED HEALTH/NURSE VISIT (OUTPATIENT)
Dept: ALLERGY | Facility: CLINIC | Age: 32
End: 2022-12-01
Payer: COMMERCIAL

## 2022-12-01 DIAGNOSIS — J30.81 ALLERGIC RHINITIS DUE TO ANIMALS: Primary | ICD-10-CM

## 2022-12-01 PROCEDURE — 95117 IMMUNOTHERAPY INJECTIONS: CPT

## 2022-12-01 NOTE — PROGRESS NOTES
Melissa LOPEZ Brad presents to clinic today at the request of Isis Lagos MD (ordering provider) for Allergy Immunotherapy injection(s).       This service provided today was under the care of Isis Lagos MD; the supervising provider of the day; who was available if needed.    Mirna Estrada

## 2022-12-15 DIAGNOSIS — Z30.9 ENCOUNTER FOR CONTRACEPTIVE MANAGEMENT, UNSPECIFIED TYPE: ICD-10-CM

## 2022-12-15 RX ORDER — LEVONORGESTREL AND ETHINYL ESTRADIOL 0.15-0.03
KIT ORAL
Qty: 84 TABLET | Refills: 0 | Status: SHIPPED | OUTPATIENT
Start: 2022-12-15 | End: 2023-02-16

## 2022-12-15 NOTE — TELEPHONE ENCOUNTER
"Last Written Prescription Date:  9/20/22  Last Fill Quantity: 84,  # refills: 0   Last office visit provider:  9/20/22     Requested Prescriptions   Pending Prescriptions Disp Refills     KURVELO 0.15-30 MG-MCG tablet [Pharmacy Med Name: KURVELO TABLETS 28S] 84 tablet 0     Sig: TAKE 1 TABLET BY MOUTH DAILY       Contraceptives Protocol Passed - 12/15/2022  3:34 AM        Passed - Patient is not a current smoker if age is 35 or older        Passed - Recent (12 mo) or future (30 days) visit within the authorizing provider's specialty     Patient has had an office visit with the authorizing provider or a provider within the authorizing providers department within the previous 12 mos or has a future within next 30 days. See \"Patient Info\" tab in inbasket, or \"Choose Columns\" in Meds & Orders section of the refill encounter.              Passed - Medication is active on med list        Passed - No active pregnancy on record        Passed - No positive pregnancy test in past 12 months             Gerri Soto RN 12/15/22 5:43 PM  "

## 2023-01-05 ENCOUNTER — ALLIED HEALTH/NURSE VISIT (OUTPATIENT)
Dept: ALLERGY | Facility: CLINIC | Age: 33
End: 2023-01-05
Payer: COMMERCIAL

## 2023-01-05 DIAGNOSIS — J30.89 ALLERGIC RHINITIS CAUSED BY MOLD: ICD-10-CM

## 2023-01-05 DIAGNOSIS — J30.81 ALLERGIC RHINITIS DUE TO ANIMALS: Primary | ICD-10-CM

## 2023-01-05 DIAGNOSIS — J30.1 SEASONAL ALLERGIC RHINITIS DUE TO POLLEN: ICD-10-CM

## 2023-01-05 PROCEDURE — 95117 IMMUNOTHERAPY INJECTIONS: CPT

## 2023-01-05 NOTE — PROGRESS NOTES
Melissa Salinas presents to clinic today at the request of Isis Lagos MD (ordering provider) for Allergy Immunotherapy injection(s).       This service provided today was under the care of Isis Lagos MD; the supervising provider of the day; who was available if needed.      Patient presented after waiting 30 minutes with no reaction to  injections. Discharged from clinic.    Venita Daniel RN

## 2023-01-26 ENCOUNTER — ALLIED HEALTH/NURSE VISIT (OUTPATIENT)
Dept: ALLERGY | Facility: CLINIC | Age: 33
End: 2023-01-26
Payer: COMMERCIAL

## 2023-01-26 DIAGNOSIS — J30.81 ALLERGIC RHINITIS DUE TO ANIMALS: Primary | ICD-10-CM

## 2023-01-26 DIAGNOSIS — J30.1 SEASONAL ALLERGIC RHINITIS DUE TO POLLEN: ICD-10-CM

## 2023-01-26 DIAGNOSIS — J30.89 ALLERGIC RHINITIS CAUSED BY MOLD: ICD-10-CM

## 2023-01-26 PROCEDURE — 95117 IMMUNOTHERAPY INJECTIONS: CPT

## 2023-01-26 NOTE — PROGRESS NOTES
Melissa Salinas presents to clinic today at the request of Isis Lagos MD (ordering provider) for Allergy Immunotherapy injection(s).       This service provided today was under the care of Isis Lagos MD; the supervising provider of the day; who was available if needed.      Patient presented after waiting 30 minutes with no reaction to  injections. Discharged from clinic.    Angelia Patrick RN

## 2023-02-16 ENCOUNTER — OFFICE VISIT (OUTPATIENT)
Dept: FAMILY MEDICINE | Facility: CLINIC | Age: 33
End: 2023-02-16
Payer: COMMERCIAL

## 2023-02-16 VITALS
WEIGHT: 250.8 LBS | OXYGEN SATURATION: 99 % | HEIGHT: 66 IN | TEMPERATURE: 98.7 F | HEART RATE: 99 BPM | DIASTOLIC BLOOD PRESSURE: 67 MMHG | BODY MASS INDEX: 40.31 KG/M2 | SYSTOLIC BLOOD PRESSURE: 130 MMHG | RESPIRATION RATE: 16 BRPM

## 2023-02-16 DIAGNOSIS — E66.01 CLASS 3 SEVERE OBESITY DUE TO EXCESS CALORIES WITHOUT SERIOUS COMORBIDITY WITH BODY MASS INDEX (BMI) OF 40.0 TO 44.9 IN ADULT (H): ICD-10-CM

## 2023-02-16 DIAGNOSIS — R03.0 ELEVATED BLOOD PRESSURE READING WITHOUT DIAGNOSIS OF HYPERTENSION: ICD-10-CM

## 2023-02-16 DIAGNOSIS — F33.0 MILD EPISODE OF RECURRENT MAJOR DEPRESSIVE DISORDER (H): ICD-10-CM

## 2023-02-16 DIAGNOSIS — Z3A.01 LESS THAN 8 WEEKS GESTATION OF PREGNANCY: Primary | ICD-10-CM

## 2023-02-16 DIAGNOSIS — E66.813 CLASS 3 SEVERE OBESITY DUE TO EXCESS CALORIES WITHOUT SERIOUS COMORBIDITY WITH BODY MASS INDEX (BMI) OF 40.0 TO 44.9 IN ADULT (H): ICD-10-CM

## 2023-02-16 DIAGNOSIS — J45.40 MODERATE PERSISTENT ASTHMA WITHOUT COMPLICATION: ICD-10-CM

## 2023-02-16 LAB — HCG UR QL: POSITIVE

## 2023-02-16 PROCEDURE — 81025 URINE PREGNANCY TEST: CPT | Performed by: FAMILY MEDICINE

## 2023-02-16 PROCEDURE — 99214 OFFICE O/P EST MOD 30 MIN: CPT | Performed by: FAMILY MEDICINE

## 2023-02-16 NOTE — PROGRESS NOTES
"  Assessment & Plan     Less than 8 weeks gestation of pregnancy  Discussed with patient that with risk factors including elevated blood pressure at her initial visit along with BMI >40 to start pregnancy, I recommended that she follow with Ob/Gyn for her prenatal care.  Discussed and ordered early dating ultrasound and referral placed to Ob/Gyn.  Discussed early pregnancy recommendations including her medications and folder of information was given.  She will follow up with Ob for her first prenatal visit.  - HCG qualitative urine  - Ob/Gyn Referral; Future  - US OB < 14 Weeks Single; Future    Class 3 severe obesity due to excess calories without serious comorbidity with body mass index (BMI) of 40.0 to 44.9 in adult (H)  Discussed continued exercise in pregnancy and congratulated her efforts in this regard.  - Ob/Gyn Referral; Future    Mild episode of recurrent major depressive disorder (H)  Patient reports that her mood has been \"okay\" off her fluoxetine.  Discussed using hydroxyzine sparingly if needed.    Moderate persistent asthma without complication  Patient will continue on her Singulair and Advair after a discussion of risks and benefits. She does not have good control of her asthma off these medicines.    Elevated blood pressure reading without diagnosis of hypertension  This is new for the patient, although she has had several readings in a pre-hypertensive range in the past.  Discussed following with Ob/Gyn given this finding.  - Ob/Gyn Referral; Future                 Return in about 1 year (around 2/16/2024) for Routine preventive.    Nadeen Andino MD  [facility] 32 [facility], presenting for the following health issues:  Pregnancy confirmation      History of Present Illness       Reason for visit:  Pregnancy confirmation    She eats 2-3 servings of fruits and vegetables daily.She consumes 0 sweetened beverage(s) daily.She exercises with " "enough effort to increase her heart rate 20 to 29 minutes per day.  She exercises with enough effort to increase her heart rate 3 or less days per week.   She is taking medications regularly.     Patient presents today for a pregnancy confirmation visit.  This was a planned pregnancy, and she is excited.  She stopped taking her fluoxetine and says that her anxiety is \"not unmanageable\".  Her asthma has been very well controlled recently.  This is her first pregnancy, she has never had a miscarriage.  Her LMP started on 1/6/23 and she is sure about this date.  She had recently stopped birth control.  This would put her at 5w6d gestation with an EDC of 10/13/23.  She denies leakage of fluid or bleeding.  She has not yet had nausea.    Review of Systems   Constitutional, HEENT, cardiovascular, pulmonary, gi and gu systems are negative, except as otherwise noted.      Objective    /67   Pulse 99   Temp 98.7  F (37.1  C)   Resp 16   Ht 1.668 m (5' 5.65\")   Wt 113.8 kg (250 lb 12.8 oz)   LMP 01/06/2023   SpO2 99%   BMI 40.91 kg/m    Body mass index is 40.91 kg/m .  Physical Exam   GENERAL: healthy, alert and no distress  RESP: breathing comfortably, no cough during the visit  NEURO: Normal strength and tone, mentation intact and speech normal  PSYCH: mentation appears normal, affect mildly anxious    Results for orders placed or performed in visit on 02/16/23   HCG qualitative urine     Status: Abnormal   Result Value Ref Range    hCG Urine Qualitative Positive (A) Negative                   "

## 2023-02-16 NOTE — PATIENT INSTRUCTIONS
You can call at your convenience to schedule a first OB visit with [facility].  I would recommend doing this between 10 and 12 weeks gestation, but what ever they suggest is fine.    You can call [phone] to schedule your early ultrasound at your convenience.  You will be 8 weeks gestation on 3/3/2023.

## 2023-02-17 PROBLEM — R03.0 ELEVATED BLOOD PRESSURE READING WITHOUT DIAGNOSIS OF HYPERTENSION: Status: ACTIVE | Noted: 2023-02-17

## 2023-02-17 PROBLEM — Z30.9 ENCOUNTER FOR CONTRACEPTIVE MANAGEMENT, UNSPECIFIED TYPE: Status: RESOLVED | Noted: 2020-06-29 | Resolved: 2023-02-17

## 2023-03-02 ENCOUNTER — ALLIED HEALTH/NURSE VISIT (OUTPATIENT)
Dept: ALLERGY | Facility: CLINIC | Age: 33
End: 2023-03-02
Payer: COMMERCIAL

## 2023-03-02 DIAGNOSIS — J30.89 ALLERGIC RHINITIS DUE TO AMERICAN HOUSE DUST MITE: ICD-10-CM

## 2023-03-02 DIAGNOSIS — J30.81 ALLERGIC RHINITIS DUE TO ANIMALS: Primary | ICD-10-CM

## 2023-03-02 DIAGNOSIS — J30.89 ALLERGIC RHINITIS CAUSED BY MOLD: ICD-10-CM

## 2023-03-02 DIAGNOSIS — J30.1 SEASONAL ALLERGIC RHINITIS DUE TO POLLEN: ICD-10-CM

## 2023-03-02 PROCEDURE — 95117 IMMUNOTHERAPY INJECTIONS: CPT

## 2023-03-07 ENCOUNTER — LAB REQUISITION (OUTPATIENT)
Dept: LAB | Facility: CLINIC | Age: 33
End: 2023-03-07

## 2023-03-07 DIAGNOSIS — Z36.89 ENCOUNTER FOR OTHER SPECIFIED ANTENATAL SCREENING: ICD-10-CM

## 2023-03-07 LAB
BASOPHILS # BLD AUTO: 0.1 10E3/UL (ref 0–0.2)
BASOPHILS NFR BLD AUTO: 1 %
EOSINOPHIL # BLD AUTO: 0.3 10E3/UL (ref 0–0.7)
EOSINOPHIL NFR BLD AUTO: 3 %
ERYTHROCYTE [DISTWIDTH] IN BLOOD BY AUTOMATED COUNT: 13.2 % (ref 10–15)
HCT VFR BLD AUTO: 40 % (ref 35–47)
HGB BLD-MCNC: 12.8 G/DL (ref 11.7–15.7)
HOLD SPECIMEN: NORMAL
IMM GRANULOCYTES # BLD: 0 10E3/UL
IMM GRANULOCYTES NFR BLD: 0 %
LYMPHOCYTES # BLD AUTO: 2.3 10E3/UL (ref 0.8–5.3)
LYMPHOCYTES NFR BLD AUTO: 24 %
MCH RBC QN AUTO: 28.6 PG (ref 26.5–33)
MCHC RBC AUTO-ENTMCNC: 32 G/DL (ref 31.5–36.5)
MCV RBC AUTO: 90 FL (ref 78–100)
MONOCYTES # BLD AUTO: 1 10E3/UL (ref 0–1.3)
MONOCYTES NFR BLD AUTO: 10 %
NEUTROPHILS # BLD AUTO: 6 10E3/UL (ref 1.6–8.3)
NEUTROPHILS NFR BLD AUTO: 62 %
NRBC # BLD AUTO: 0 10E3/UL
NRBC BLD AUTO-RTO: 0 /100
PLATELET # BLD AUTO: 339 10E3/UL (ref 150–450)
RBC # BLD AUTO: 4.47 10E6/UL (ref 3.8–5.2)
WBC # BLD AUTO: 9.6 10E3/UL (ref 4–11)

## 2023-03-07 PROCEDURE — 86762 RUBELLA ANTIBODY: CPT | Performed by: NURSE PRACTITIONER

## 2023-03-07 PROCEDURE — 86850 RBC ANTIBODY SCREEN: CPT | Performed by: NURSE PRACTITIONER

## 2023-03-07 PROCEDURE — 87340 HEPATITIS B SURFACE AG IA: CPT | Performed by: NURSE PRACTITIONER

## 2023-03-07 PROCEDURE — 86803 HEPATITIS C AB TEST: CPT | Performed by: NURSE PRACTITIONER

## 2023-03-07 PROCEDURE — 87389 HIV-1 AG W/HIV-1&-2 AB AG IA: CPT | Performed by: NURSE PRACTITIONER

## 2023-03-07 PROCEDURE — 85004 AUTOMATED DIFF WBC COUNT: CPT | Performed by: NURSE PRACTITIONER

## 2023-03-07 PROCEDURE — 86780 TREPONEMA PALLIDUM: CPT | Performed by: NURSE PRACTITIONER

## 2023-03-08 LAB
ABO/RH(D): NORMAL
ANTIBODY SCREEN: NEGATIVE
HBV SURFACE AG SERPL QL IA: NONREACTIVE
HCV AB SERPL QL IA: NONREACTIVE
HIV 1+2 AB+HIV1 P24 AG SERPL QL IA: NONREACTIVE
RUBV IGG SERPL QL IA: 5.13 INDEX
RUBV IGG SERPL QL IA: POSITIVE
SPECIMEN EXPIRATION DATE: NORMAL
T PALLIDUM AB SER QL: NONREACTIVE

## 2023-03-23 DIAGNOSIS — J30.1 SEASONAL ALLERGIC RHINITIS DUE TO POLLEN: ICD-10-CM

## 2023-03-23 DIAGNOSIS — J30.89 ALLERGIC RHINITIS CAUSED BY MOLD: ICD-10-CM

## 2023-03-23 DIAGNOSIS — J30.89 ALLERGIC RHINITIS DUE TO AMERICAN HOUSE DUST MITE: ICD-10-CM

## 2023-03-23 DIAGNOSIS — J30.81 ALLERGIC RHINITIS DUE TO ANIMALS: Primary | ICD-10-CM

## 2023-03-23 PROCEDURE — 95165 ANTIGEN THERAPY SERVICES: CPT | Performed by: ALLERGY & IMMUNOLOGY

## 2023-03-23 NOTE — PROGRESS NOTES
ALT/DFM/DPM/AP DOG  1:1 V/V EXP 3/21/2024  GRASS/CAT  1:1 V/V EXP 3/21/2024    CHECKED BY LAT  CHARGED 20 UNITS

## 2023-03-30 ENCOUNTER — ALLIED HEALTH/NURSE VISIT (OUTPATIENT)
Dept: ALLERGY | Facility: CLINIC | Age: 33
End: 2023-03-30
Payer: COMMERCIAL

## 2023-03-30 DIAGNOSIS — J30.89 ALLERGIC RHINITIS CAUSED BY MOLD: ICD-10-CM

## 2023-03-30 DIAGNOSIS — J30.1 SEASONAL ALLERGIC RHINITIS DUE TO POLLEN: ICD-10-CM

## 2023-03-30 DIAGNOSIS — J30.81 ALLERGIC RHINITIS DUE TO ANIMALS: Primary | ICD-10-CM

## 2023-03-30 DIAGNOSIS — T78.40XA ALLERGIC REACTION: Primary | ICD-10-CM

## 2023-03-30 PROCEDURE — 95117 IMMUNOTHERAPY INJECTIONS: CPT

## 2023-03-30 RX ORDER — EPINEPHRINE 0.3 MG/.3ML
INJECTION SUBCUTANEOUS
Qty: 2 EACH | Refills: 0 | Status: SHIPPED | OUTPATIENT
Start: 2023-03-30 | End: 2023-08-24

## 2023-04-12 ENCOUNTER — TRANSFERRED RECORDS (OUTPATIENT)
Dept: HEALTH INFORMATION MANAGEMENT | Facility: CLINIC | Age: 33
End: 2023-04-12
Payer: COMMERCIAL

## 2023-04-19 ENCOUNTER — MEDICAL CORRESPONDENCE (OUTPATIENT)
Dept: HEALTH INFORMATION MANAGEMENT | Facility: CLINIC | Age: 33
End: 2023-04-19
Payer: COMMERCIAL

## 2023-04-19 ENCOUNTER — TRANSFERRED RECORDS (OUTPATIENT)
Dept: HEALTH INFORMATION MANAGEMENT | Facility: CLINIC | Age: 33
End: 2023-04-19
Payer: COMMERCIAL

## 2023-04-20 ENCOUNTER — TELEPHONE (OUTPATIENT)
Dept: EDUCATION SERVICES | Facility: CLINIC | Age: 33
End: 2023-04-20
Payer: COMMERCIAL

## 2023-04-20 NOTE — TELEPHONE ENCOUNTER
External - [facility]   Referring Provider: Dr. Kan   DX: GDM     Ref./rec. Were received in the DM consult folder on 04.19.2023 at 2:27 PM

## 2023-04-27 ENCOUNTER — OFFICE VISIT (OUTPATIENT)
Dept: ALLERGY | Facility: CLINIC | Age: 33
End: 2023-04-27
Payer: COMMERCIAL

## 2023-04-27 ENCOUNTER — ALLIED HEALTH/NURSE VISIT (OUTPATIENT)
Dept: EDUCATION SERVICES | Facility: CLINIC | Age: 33
End: 2023-04-27
Payer: COMMERCIAL

## 2023-04-27 VITALS — HEART RATE: 92 BPM | WEIGHT: 247 LBS | OXYGEN SATURATION: 97 % | BODY MASS INDEX: 39.7 KG/M2 | HEIGHT: 66 IN

## 2023-04-27 VITALS — WEIGHT: 247 LBS | BODY MASS INDEX: 40.29 KG/M2

## 2023-04-27 DIAGNOSIS — J30.89 ALLERGIC RHINITIS CAUSED BY MOLD: ICD-10-CM

## 2023-04-27 DIAGNOSIS — Z33.1 PREGNANCY, INCIDENTAL: ICD-10-CM

## 2023-04-27 DIAGNOSIS — J30.81 ALLERGIC RHINITIS DUE TO ANIMALS: Primary | ICD-10-CM

## 2023-04-27 DIAGNOSIS — J30.1 SEASONAL ALLERGIC RHINITIS DUE TO POLLEN: ICD-10-CM

## 2023-04-27 DIAGNOSIS — J45.30 MILD PERSISTENT ASTHMA WITHOUT COMPLICATION: ICD-10-CM

## 2023-04-27 DIAGNOSIS — O24.419 GESTATIONAL DIABETES MELLITUS (GDM) IN SECOND TRIMESTER, GESTATIONAL DIABETES METHOD OF CONTROL UNSPECIFIED: Primary | ICD-10-CM

## 2023-04-27 DIAGNOSIS — J30.89 ALLERGIC RHINITIS DUE TO AMERICAN HOUSE DUST MITE: ICD-10-CM

## 2023-04-27 PROCEDURE — 95117 IMMUNOTHERAPY INJECTIONS: CPT | Performed by: ALLERGY & IMMUNOLOGY

## 2023-04-27 PROCEDURE — G0109 DIAB MANAGE TRN IND/GROUP: HCPCS

## 2023-04-27 RX ORDER — LANCETS
EACH MISCELLANEOUS
Qty: 200 EACH | Refills: 3 | Status: SHIPPED | OUTPATIENT
Start: 2023-04-27

## 2023-04-27 RX ORDER — BLOOD SUGAR DIAGNOSTIC
STRIP MISCELLANEOUS
Qty: 200 STRIP | Refills: 3 | Status: SHIPPED | OUTPATIENT
Start: 2023-04-27

## 2023-04-27 ASSESSMENT — ASTHMA QUESTIONNAIRES
QUESTION_2 LAST FOUR WEEKS HOW OFTEN HAVE YOU HAD SHORTNESS OF BREATH: ONCE OR TWICE A WEEK
QUESTION_1 LAST FOUR WEEKS HOW MUCH OF THE TIME DID YOUR ASTHMA KEEP YOU FROM GETTING AS MUCH DONE AT WORK, SCHOOL OR AT HOME: NONE OF THE TIME
QUESTION_3 LAST FOUR WEEKS HOW OFTEN DID YOUR ASTHMA SYMPTOMS (WHEEZING, COUGHING, SHORTNESS OF BREATH, CHEST TIGHTNESS OR PAIN) WAKE YOU UP AT NIGHT OR EARLIER THAN USUAL IN THE MORNING: NOT AT ALL
ACT_TOTALSCORE: 23
QUESTION_5 LAST FOUR WEEKS HOW WOULD YOU RATE YOUR ASTHMA CONTROL: COMPLETELY CONTROLLED
ACT_TOTALSCORE: 23
QUESTION_4 LAST FOUR WEEKS HOW OFTEN HAVE YOU USED YOUR RESCUE INHALER OR NEBULIZER MEDICATION (SUCH AS ALBUTEROL): ONCE A WEEK OR LESS

## 2023-04-27 NOTE — LETTER
2023         RE: Melissa Salinas  [address] [place] [address]        Dear Colleague,    Thank you for referring your patient, Melissa Salinas, to the [facility]. Please see a copy of my visit note below.    Diabetes Self-Management Education & Support  Type of Service: In Person Visit    SUBJECTIVE/OBJECTIVE:  Presents for education related to gestational diabetes.    Diabetes management related comments/concerns: general management of blood sugar,the [facility] planned for delivery: [facility]  Number of previous pregnancies: 0  Had any babies over 9 lbs: No  Previously had Gestational Diabetes: No  Have you ever had thyroid problems or taken thyroid medication?: No  Heart disease, mitral valve prolapse or rheumatic fever?: No  Hypertension : No  High Cholesterol: No  High Triglycerides: No  Do you use tobacco products?: No  Do you drink beer, wine or hard liquor?: No    Cultural Influences/Ethnic Background:  Not  or       Estimated Date of Delivery: Oct 23, 2023    3 hour OGTT    Fasting  109  1 hour  174  2 hour  163  3 hour  --  Lifestyle and Health Behaviors:  Pre-pregnancy weight (lbs): 245  Barrier to exercise: Time, Other  Cultural/Taoist diet restrictions?: No  Meal planning/habits: None  How many times a week on average do you eat food made away from home (restaurant/take-out)?: 4  Meals include: Breakfast, Lunch, Dinner, Morning Snack, Evening Snack  Beverages: Water, Diet soda, Soda  How many servings of fruits/vegetables per day: 3  Biggest challenges to healthy eating: Portion control, Eating out, Emotional eating  Pre-becky vitamin?: Yes  Supplements?: No  Experiencing nausea?: Yes  Experiencing heartburn?: Yes    Healthy Coping:  Informal Support system:: Family, Spouse    Current Management:       ASSESSMENT:  Melissa is here with her  today for her initial education for Gestational Diabetes.  This is her first  pregnancy, first with GDM.  They are having a boy.  Melissa struggles with anxiety and has been trying not to take her anxiety medication unless she needs to.  She also has trouble with allergies and asthma.    INTERVENTION:  Patient was instructed on Accu-Chek Guide Me meter and was able to provide an accurate return demonstration. Patient's blood glucose reading today was 134 mg/dL, about 1 hour after godinez, egg and cheese bagel sandwich and few sips of strawberry banana smoothie.    Educational topics covered today:  GDM diagnosis, pathophysiology, Risks and Complications of GDM, Means of controlling GDM, Using a Blood Glucose Monitor, Blood Glucose Goals, Logging and Interpreting Glucose Results, Ketone Testing, When to Call a Diabetes Educator or OB Provider, Healthy Eating During Pregnancy, Counting Carbohydrates, Meal Planning for GDM, and Physical Activity    Educational materials provided today:   Enriqueta Vance Gestational Diabetes  GDM Log Book  Sharps Disposal  Care After Delivery  Accu-Chek Guide Me meter kit    Pt verbalized understanding of concepts discussed and recommendations provided today.     PLAN:  Check glucose 4 times daily, before breakfast and 1 hour after each meal.     Check Ketones daily for one week, if negative, reduce testing to once a week.     Physical activity recommended: Yes.    Meal plan: 15-30 carbs at breakfast, 45-60 carbs at lunch, 45-60 carbs at supper, 15-30 carbs at 3 snacks a day.  Follow consistent CHO meal plan, eat CHO and protein/fat at all meals/snacks.    Call/e-mail/brotipshart message diabetes educator if 3 or more blood sugars are above the goal in 1 week, if ketones are positive, or with questions/concerns.     The service provided today was under the supervising provider, Gilda Watkins, who was available if needed.     Time Spent: 60 minutes  Encounter Type: Group class    Any diabetes medication dose changes were made via the CDE Protocol and Collaborative  Practice Agreement with the patient's referring provider. A copy of this encounter was shared with the provider.

## 2023-04-27 NOTE — PROGRESS NOTES
Melissa Salinas presents to clinic today at the request of Isis Lagos MD (ordering provider) for Allergy Immunotherapy injection(s).       This service provided today was under the care of Isis Lagos MD; the supervising provider of the day; who was available if needed.    Mirna Estrada MA

## 2023-04-27 NOTE — PROGRESS NOTES
"      Bobby Torres is a 32 year old, presenting for the following health issues:  RECHECK (Follow-up on shots)    HPI     Chief complaint: Follow-up allergies    History of present illness: This is a pleasant 32-year-old woman currently undergoing allergy to therapy.  She started allergy shots in April of last year.  No systemic reactions.  She is currently pregnant so she is moved back to 0.3 mL of 1: Red vial.  She feels that she seen some improvement in her symptoms.  Less nasal congestion and drainage.  Asthma is been well controlled and she continues on Advair 250/51 puff twice daily.  She reports she had a cold a few weeks ago and needed albuterol for few days but otherwise has done well.  No difficulties over the winter.  She has noted some nasal congestion.  She continues on montelukast as well as Zyrtec.  No other concerns.          Objective    Pulse 92   Ht 1.668 m (5' 5.65\")   Wt 112 kg (247 lb)   LMP 01/06/2023   SpO2 97%   BMI 40.29 kg/m    Body mass index is 40.29 kg/m .  Physical Exam   Gen: Pleasant female not in acute distress  HEENT: Eyes no erythema of the bulbar or palpebral conjunctiva, no edema. Nose: No congestion, mucosa normal. Mouth: Throat clear, no lip or tongue edema.     Respiratory: Clear to auscultation bilaterally, no adventitious breath sounds    Psych: Alert and oriented times 3    Impression report and plan:    1.  Allergic rhinitis  2.  Mild persistent asthma  3.  Pregnancy    Continue allergy shots.  Continue current medication therapy.  Notify of systemic reaction.  Follow in 6 months.  Rebuild to 0.5 after delivery.        "

## 2023-04-27 NOTE — PROGRESS NOTES
Diabetes Self-Management Education & Support  Type of Service: In Person Visit    SUBJECTIVE/OBJECTIVE:  Presents for education related to gestational diabetes.    Diabetes management related comments/concerns: general management of blood sugar,the [facility] planned for delivery: [facility]  Number of previous pregnancies: 0  Had any babies over 9 lbs: No  Previously had Gestational Diabetes: No  Have you ever had thyroid problems or taken thyroid medication?: No  Heart disease, mitral valve prolapse or rheumatic fever?: No  Hypertension : No  High Cholesterol: No  High Triglycerides: No  Do you use tobacco products?: No  Do you drink beer, wine or hard liquor?: No    Cultural Influences/Ethnic Background:  Not  or       Estimated Date of Delivery: Oct 23, 2023    3 hour OGTT    Fasting  109  1 hour  174  2 hour  163  3 hour  --  Lifestyle and Health Behaviors:  Pre-pregnancy weight (lbs): 245  Barrier to exercise: Time, Other  Cultural/Cheondoism diet restrictions?: No  Meal planning/habits: None  How many times a week on average do you eat food made away from home (restaurant/take-out)?: 4  Meals include: Breakfast, Lunch, Dinner, Morning Snack, Evening Snack  Beverages: Water, Diet soda, Soda  How many servings of fruits/vegetables per day: 3  Biggest challenges to healthy eating: Portion control, Eating out, Emotional eating  Pre-becky vitamin?: Yes  Supplements?: No  Experiencing nausea?: Yes  Experiencing heartburn?: Yes    Healthy Coping:  Informal Support system:: Family, Spouse    Current Management:       ASSESSMENT:  Melissa is here with her  today for her initial education for Gestational Diabetes.  This is her first pregnancy, first with GDM.  They are having a boy.  Melissa struggles with anxiety and has been trying not to take her anxiety medication unless she needs to.  She also has trouble with allergies and asthma.    INTERVENTION:  Patient was instructed on Accu-Chek Guide  Me meter and was able to provide an accurate return demonstration. Patient's blood glucose reading today was 134 mg/dL, about 1 hour after godinez, egg and cheese bagel sandwich and few sips of strawberry banana smoothie.    Educational topics covered today:  GDM diagnosis, pathophysiology, Risks and Complications of GDM, Means of controlling GDM, Using a Blood Glucose Monitor, Blood Glucose Goals, Logging and Interpreting Glucose Results, Ketone Testing, When to Call a Diabetes Educator or OB Provider, Healthy Eating During Pregnancy, Counting Carbohydrates, Meal Planning for GDM, and Physical Activity    Educational materials provided today:   Enriqueta Understanding Gestational Diabetes  GDM Log Book  Sharps Disposal  Care After Delivery  Accu-Chek Guide Me meter kit    Pt verbalized understanding of concepts discussed and recommendations provided today.     PLAN:  Check glucose 4 times daily, before breakfast and 1 hour after each meal.     Check Ketones daily for one week, if negative, reduce testing to once a week.     Physical activity recommended: Yes.    Meal plan: 15-30 carbs at breakfast, 45-60 carbs at lunch, 45-60 carbs at supper, 15-30 carbs at 3 snacks a day.  Follow consistent CHO meal plan, eat CHO and protein/fat at all meals/snacks.    Call/e-mail/MyChart message diabetes educator if 3 or more blood sugars are above the goal in 1 week, if ketones are positive, or with questions/concerns.     The service provided today was under the supervising provider, Gilda Watkins, who was available if needed.     Time Spent: 60 minutes  Encounter Type: Group class    Any diabetes medication dose changes were made via the CDE Protocol and Collaborative Practice Agreement with the patient's referring provider. A copy of this encounter was shared with the provider.

## 2023-04-27 NOTE — LETTER
4/27/2023         RE: Melissa Salinas  [address] [address]        Dear Colleague,    Thank you for referring your patient, Melissa aSlinas, to the [facility]. Please see a copy of my visit note below.    Melissa Salinas presents to clinic today at the request of Isis Lagos MD (ordering provider) for Allergy Immunotherapy injection(s).       This service provided today was under the care of Isis Lagos MD; the supervising provider of the day; who was available if needed.    Mirna Estrada MA      Again, thank you for allowing me to participate in the care of your patient.        Sincerely,        Isis LAGOS MD

## 2023-05-08 ENCOUNTER — LAB REQUISITION (OUTPATIENT)
Dept: LAB | Facility: CLINIC | Age: 33
End: 2023-05-08

## 2023-05-08 DIAGNOSIS — Z3A.16 16 WEEKS GESTATION OF PREGNANCY: ICD-10-CM

## 2023-05-08 PROCEDURE — 82105 ALPHA-FETOPROTEIN SERUM: CPT | Performed by: NURSE PRACTITIONER

## 2023-05-10 ENCOUNTER — VIRTUAL VISIT (OUTPATIENT)
Dept: EDUCATION SERVICES | Facility: CLINIC | Age: 33
End: 2023-05-10
Payer: COMMERCIAL

## 2023-05-10 DIAGNOSIS — O24.419 GESTATIONAL DIABETES MELLITUS (GDM) IN SECOND TRIMESTER, GESTATIONAL DIABETES METHOD OF CONTROL UNSPECIFIED: Primary | ICD-10-CM

## 2023-05-10 LAB
# FETUSES US: NORMAL
AFP MOM SERPL: 1.69
AFP SERPL-MCNC: 33 NG/ML
AGE - REPORTED: 34.1 YR
CURRENT SMOKER: NO
FAMILY MEMBER DISEASES HX: NO
GA METHOD: NORMAL
GA: NORMAL WK
IDDM PATIENT QL: YES
INTEGRATED SCN PATIENT-IMP: NORMAL
SPECIMEN DRAWN SERPL: NORMAL

## 2023-05-10 PROCEDURE — 98968 PH1 ASSMT&MGMT NQHP 21-30: CPT | Mod: VID | Performed by: DIETITIAN, REGISTERED

## 2023-05-10 NOTE — LETTER
5/10/2023         RE: Melissa Salinas  [address] [address]        Dear Colleague,    Thank you for referring your patient, Melissa Salinas, to the [facility]. Please see a copy of my visit note below.    Diabetes Self-Management Education & Support  Type of service:  Video Visit    If the video visit is dropped, the video visit invitation should be resent by: Text to cell phone: [phone]    Originating Location (pt. Location): Other work  Distant Location (provider location): Offsite  Mode of Communication:  Video Conference via Sky[employer] Start Time: 1:57 PM  Video End Time (time video stopped): 2:10 PM - then continued on the phone until 2:18 PM    How would patient like to obtain AVS? MyChart      SUBJECTIVE/OBJECTIVE:  Presents for education related to gestational diabetes.    Accompanied by: Self  Diabetes management related comments/concerns: general management of blood sugar,the basics  Gestational weeks: 16w2d  Number of previous pregnancies: 0  Had any babies over 9 lbs: No  Previously had Gestational Diabetes: No  Have you ever had thyroid problems or taken thyroid medication?: No  Heart disease, mitral valve prolapse or rheumatic fever?: No  Hypertension : No  High Cholesterol: No  High Triglycerides: No  Do you use tobacco products?: No  Do you drink beer, wine or hard liquor?: No    Cultural Influences/Ethnic Background:  Not  or     LMP 01/06/2023     Estimated Date of Delivery: Oct 23, 2023    Blood Glucose/Ketone Log:   DATE Ketones Fasting BG (mg/dL) 1 hour Post Breakfast BG (mg/dL) 1 hour Post Lunch BG (mg/dL) 1 hour post Dinner BG (mg/dL)   5/10 neg 95 120 - -   5/9 Neg/trace 91 118 105 128   5/8 neg 92 121 116 119   5/7 neg 84 112 106 - sort of like dinner - slept in   5/6 neg 89 122 98 111   5/5 neg 96 110 124 109   5/4 neg 85 118 102 97   5/3 neg 89 125 113 115       Lifestyle and Health Behaviors:  Pre-pregnancy  weight (lbs): 245  Barrier to exercise: Time, Other  Cultural/Temple diet restrictions?: No  Meal planning/habits: None  How many times a week on average do you eat food made away from home (restaurant/take-out)?: 4  Meals include: Breakfast, Lunch, Dinner, Morning Snack, Evening Snack  Breakfast: tried cereal once and BG was high - Now having - breakfast sandwich OR burritos - with a banana  Lunch: frozen meal with veggie with a fruit  Dinner: hasn't changed much from before - just having smaller portions  Snacks: cheese sticks, veggies, nuts  Beverages: Water, Diet soda (sparkling water)  How many servings of fruits/vegetables per day: 3  Biggest challenges to healthy eating: Portion control, Eating out, Emotional eating  Pre- vitamin?: Yes  Supplements?: No  Experiencing nausea?: Yes  Experiencing heartburn?: Yes    Healthy Coping:  Emotional response to diabetes: Ready to learn  Informal Support system:: Family, Spouse  Stage of change: ACTION (Actively working towards change)    Current Management:  Taking medications for gestational diabetes?: No  Difficulty affording diabetes medication?: No  Difficulty affording diabetes testing supplies?: No    ASSESSMENT:  Ketones: 100%.   Fasting blood glucoses: 75% in target.  After breakfast: 100% in target.  After lunch: 100% in target.  After dinner: 100% in target.    Patient has been feeling a bit overwhelmed by checking BG and making sure she is eating appropriately. We reviewed meal planning, food choices and benefit of bedtime snack - she is doing a great job at this. Reviewed BG goals and risks of elevated BG. Patient feeling better after our visit today. Reviewed when to call if BG rise.     INTERVENTION:  Educational topics covered today:  What to expect after delivery, Future testing for Type 2 diabetes (2 hour OGTT at 6 week post-partum check-up and annual fasting blood glucose level), Risk of GDM and planning ahead for future pregnancies,  Recommended lifestyle interventions for reducing the risk of Type 2 Diabetes, When to Call a Diabetes Educator or OB Provider    Educational Materials provided today:  Enriqueta Preventing Diabetes    PLAN:  Check glucose 4 times daily.  Check ketones once a week when readings are consistently negative.  Continue with recommended physical activity.  Continue to follow recommended meal plan: 2 carbs at breakfast, 4 carbs at lunch, 4 carbs at supper, 4 carbs at snacks.  Follow consistent CHO meal plan, eat CHO and protein/fat at all meals/snacks.    Call/e-mail/MyChart message diabetes educator if 3 or more blood sugars are above the goal in 1 week or if ketones are positive.    Time Spent: 21 minutes  Encounter Type: Individual    Any diabetes medication dose changes were made via the CDE Protocol and Collaborative Practice Agreement with the patient's referring provider. A copy of this encounter was shared with the provider.

## 2023-05-10 NOTE — PATIENT INSTRUCTIONS
Goals for Gestational Diabetes Care:    1. Eat balanced meals (3 meals + 3 snacks daily)    Breakfast: 30 grams carbohydrate + Protein  Snack: 15-30 grams carbohydrate + protein  Lunch: 45-60 grams carbohydrate + protein/vegetables  Snack: 15-30 grams Carbohydrate + protein  Dinner: 45-60 grams carbohydrate + protein/vegetables  Bedtime Snack: 15-30 grams + protein    ----Make sure you include protein source with each meal and at bedtime - this has been shown to help with blood glucose elevations    2. Each Morning Check Ketones (small/mod/high - call Diabetes Care Line)  Your goal is negative or trace ketones. If you have ketones in your urine it means you are not eating enough before you go to bed. Eat a larger bedtime snack and include protein.     3. Aim to get at least 30 minutes of activity each day. Activity really helps improve blood sugars.     4. Blood Glucose Targets:   1. Fasting Less than 95 mg/dL   2. 1 hours after a meal target is less than 140 mg/dL  ----Always remember to bring meter and log book to all appointments.      Follow up with your Diabetic Educator to assess BG targets in 2 weeks.  If Blood glucose levels are above normal 3 times or more in one week and you cannot explain them or if you develop small, moderate or high ketones call Diabetes Care at [phone]    Call with any questions.    Thank you,  Adela Gpison RDN, KARIS, [facility] [place]   Certified Diabetes Care &   [phone]

## 2023-05-10 NOTE — PROGRESS NOTES
Diabetes Self-Management Education & Support  Type of service:  Video Visit    If the video visit is dropped, the video visit invitation should be resent by: Text to cell phone: [phone]    Originating Location (pt. Location): Other work  Distant Location (provider location): Offsite  Mode of Communication:  Video Conference via userADgents    Video Start Time: 1:57 PM  Video End Time (time video stopped): 2:10 PM - then continued on the phone until 2:18 PM    How would patient like to obtain AVS? MyChart      SUBJECTIVE/OBJECTIVE:  Presents for education related to gestational diabetes.    Accompanied by: Self  Diabetes management related comments/concerns: general management of blood sugar,the basics  Gestational weeks: 16w2d  Number of previous pregnancies: 0  Had any babies over 9 lbs: No  Previously had Gestational Diabetes: No  Have you ever had thyroid problems or taken thyroid medication?: No  Heart disease, mitral valve prolapse or rheumatic fever?: No  Hypertension : No  High Cholesterol: No  High Triglycerides: No  Do you use tobacco products?: No  Do you drink beer, wine or hard liquor?: No    Cultural Influences/Ethnic Background:  Not  or     LMP 01/06/2023     Estimated Date of Delivery: Oct 23, 2023    Blood Glucose/Ketone Log:   DATE Ketones Fasting BG (mg/dL) 1 hour Post Breakfast BG (mg/dL) 1 hour Post Lunch BG (mg/dL) 1 hour post Dinner BG (mg/dL)   5/10 neg 95 120 - -   5/9 Neg/trace 91 118 105 128   5/8 neg 92 121 116 119   5/7 neg 84 112 106 - sort of like dinner - slept in   5/6 neg 89 122 98 111   5/5 neg 96 110 124 109   5/4 neg 85 118 102 97   5/3 neg 89 125 113 115       Lifestyle and Health Behaviors:  Pre-pregnancy weight (lbs): 245  Barrier to exercise: Time, Other  Cultural/Pentecostalism diet restrictions?: No  Meal planning/habits: None  How many times a week on average do you eat food made away from home (restaurant/take-out)?: 4  Meals include: Breakfast, Lunch,  Dinner, Morning Snack, Evening Snack  Breakfast: tried cereal once and BG was high - Now having - breakfast sandwich OR burritos - with a banana  Lunch: frozen meal with veggie with a fruit  Dinner: hasn't changed much from before - just having smaller portions  Snacks: cheese sticks, veggies, nuts  Beverages: Water, Diet soda (sparkling water)  How many servings of fruits/vegetables per day: 3  Biggest challenges to healthy eating: Portion control, Eating out, Emotional eating  Pre-becky vitamin?: Yes  Supplements?: No  Experiencing nausea?: Yes  Experiencing heartburn?: Yes    Healthy Coping:  Emotional response to diabetes: Ready to learn  Informal Support system:: Family, Spouse  Stage of change: ACTION (Actively working towards change)    Current Management:  Taking medications for gestational diabetes?: No  Difficulty affording diabetes medication?: No  Difficulty affording diabetes testing supplies?: No    ASSESSMENT:  Ketones: 100%.   Fasting blood glucoses: 75% in target.  After breakfast: 100% in target.  After lunch: 100% in target.  After dinner: 100% in target.    Patient has been feeling a bit overwhelmed by checking BG and making sure she is eating appropriately. We reviewed meal planning, food choices and benefit of bedtime snack - she is doing a great job at this. Reviewed BG goals and risks of elevated BG. Patient feeling better after our visit today. Reviewed when to call if BG rise.     INTERVENTION:  Educational topics covered today:  What to expect after delivery, Future testing for Type 2 diabetes (2 hour OGTT at 6 week post-partum check-up and annual fasting blood glucose level), Risk of GDM and planning ahead for future pregnancies, Recommended lifestyle interventions for reducing the risk of Type 2 Diabetes, When to Call a Diabetes Educator or OB Provider    Educational Materials provided today:  [place] Preventing Diabetes    PLAN:  Check glucose 4 times daily.  Check ketones once a week  when readings are consistently negative.  Continue with recommended physical activity.  Continue to follow recommended meal plan: 2 carbs at breakfast, 4 carbs at lunch, 4 carbs at supper, 4 carbs at snacks.  Follow consistent CHO meal plan, eat CHO and protein/fat at all meals/snacks.    Call/e-mail/MyChart message diabetes educator if 3 or more blood sugars are above the goal in 1 week or if ketones are positive.    Time Spent: 21 minutes  Encounter Type: Individual    Any diabetes medication dose changes were made via the CDE Protocol and Collaborative Practice Agreement with the patient's referring provider. A copy of this encounter was shared with the provider.

## 2023-05-22 ENCOUNTER — VIRTUAL VISIT (OUTPATIENT)
Dept: EDUCATION SERVICES | Facility: CLINIC | Age: 33
End: 2023-05-22
Payer: COMMERCIAL

## 2023-05-22 DIAGNOSIS — O24.419 GESTATIONAL DIABETES MELLITUS (GDM) IN SECOND TRIMESTER, GESTATIONAL DIABETES METHOD OF CONTROL UNSPECIFIED: Primary | ICD-10-CM

## 2023-05-22 PROCEDURE — 98967 PH1 ASSMT&MGMT NQHP 11-20: CPT | Mod: 93

## 2023-05-22 NOTE — LETTER
5/22/2023         RE: Melissa Salinas  [address] [address]        Dear Colleague,    Thank you for referring your patient, Melissa Salinas, to the [facility]. Please see a copy of my visit note below.    Diabetes and Pregnancy Follow-up  Type of Service: Telephone Visit/ 20 minutes     Originating Location (Patient Location): [place], [place]  Distant Location (Provider Location): [place] [facility]  Mode of Communication:  Telephone     Telephone Visit Start Time: 2:03 PM  Telephone Visit End Time (telephone visit stop time): 2:23 PM     How would patient like to obtain AVS? MyChart      Subjective/Objective:    Melissa Salinas was called for a scheduled BG review. Last date of communication was: 5/10/23.    Gestational diabetes is being managed with diet and activity    Taking diabetes medications: no    Estimated Date of Delivery: Oct 23, 2023    Blood Glucose/Ketone Log:    Date Ketones Fasting Post Breakfast Post Lunch Post Supper   5/10  95 120 138 97   5/11  88 104 93 123   5/12  88 133 109 130   5/13  87 123 103 112   5/14   93 118    5/15 negative 90 122 122 115   5/16  91 114 97 106   5/17  88 123 99 128   5/18  90 111 110 113   5/19  94 134 123 130   5/20  89 153 115 117   5/21  88 101 136 120   5/22 negative 86 116 95    (testing 1 hour from start of meal)    Assessment:  Ketones: negative x2.   Fasting blood glucoses: 100% in target.  After breakfast: 92% in target.  Before lunch: n/a% in target.  After lunch: 100% in target.  Before dinner: n/a% in target.  After dinner: 100% in target.    Affirmed patient's hard work. Reviewed how insulin needs change during pregnancy.  Reviewed impact of placental hormones on insulin resistance. Provided encouragement.    Plan/Response:  1. Test glucose 4 times per day:   Fasting (when you first awake for the day): 95 mg/dL or below   1 hour after breakfast: 140 mg/dL or below   1 hour after lunch: 140 mg/dL or below   1  hour after dinner: 140 mg/dL or below     Please bring your meter and log book to all appointments     If you miss 1 hour after meal test, test 2 hours after the meal.  Goal 2 hours after is 120 mg/dL or below.     2.  Check your urine ketones once a week, when you first awake for the day.  Goal is negative or trace.    3.  Meal Plan    Breakfast: 30 grams carbohydrate + protein   Snack: 15-30 grams carbohydrate + protein  Lunch: 45-60 grams carbohydrate + protein  Snack: 15-30 grams carbohydrate + protein  Dinner: 45-60 grams carbohydrate + protein  Snack: 15-30 grams carbohydrate + protein    A few tips:   -consume some carbohydrate every 2-3 hours while awake   -you need a minimum of 175 grams of carbohydrate per day   -fruit and cold breakfast cereal are best tolerated at lunch or later   -protein includes: cheese, eggs, fish, nuts, nut butter, chicken, turkey, beef, and pork   -snack ideas: an individual container of Greek yogurt (try Chobani Less Sugar), whole grain crackers and cheese, chocolate fairlife milk, a Kashi or KIND bar, a baseball size piece of whole fruit + nut butter (apple + peanut butter), fruit canned in it's own juice + cottage cheese    4.  Aim for 20-30 minutes of activity most days of the week (with the okay of your OB provider).      5. Follow up:    -Monday, June 5, 2023 via Web[employer] with blood sugars and urine ketones   -Monday, June 19, 2023 with Beckie (virtual visit)    6.  Call Diabetes Education at [phone] or send a Webspy message with:   -questions or concerns   -ketones that are small, moderate, or large   -3 or more blood sugars above target in a 7 day period  Beckie Stephenson, MPH, RD, CDCES, LD 5/22/2023    Time Spent: 20 minutes    Any diabetes medication dose changes were made via the CDE Protocol and Collaborative Practice Agreement with the patient's referring provider. A copy of this encounter was shared with the provider.

## 2023-05-22 NOTE — PROGRESS NOTES
Diabetes and Pregnancy Follow-up  Type of Service: Telephone Visit/ 20 minutes     Originating Location (Patient Location): [place], [place]  Distant Location (Provider Location): [place] [facility]  Mode of Communication:  Telephone     Telephone Visit Start Time: 2:03 PM  Telephone Visit End Time (telephone visit stop time): 2:23 PM     How would patient like to obtain AVS? Phil      Subjective/Objective:    Melissa Salinas was called for a scheduled BG review. Last date of communication was: 5/10/23.    Gestational diabetes is being managed with diet and activity    Taking diabetes medications: no    Estimated Date of Delivery: Oct 23, 2023    Blood Glucose/Ketone Log:    Date Ketones Fasting Post Breakfast Post Lunch Post Supper   5/10  95 120 138 97   5/11  88 104 93 123   5/12  88 133 109 130   5/13  87 123 103 112   5/14   93 118    5/15 negative 90 122 122 115   5/16  91 114 97 106   5/17  88 123 99 128   5/18  90 111 110 113   5/19  94 134 123 130   5/20  89 153 115 117   5/21  88 101 136 120   5/22 negative 86 116 95    (testing 1 hour from start of meal)    Assessment:  Ketones: negative x2.   Fasting blood glucoses: 100% in target.  After breakfast: 92% in target.  Before lunch: n/a% in target.  After lunch: 100% in target.  Before dinner: n/a% in target.  After dinner: 100% in target.    Affirmed patient's hard work. Reviewed how insulin needs change during pregnancy.  Reviewed impact of placental hormones on insulin resistance. Provided encouragement.    Plan/Response:  1. Test glucose 4 times per day:   Fasting (when you first awake for the day): 95 mg/dL or below   1 hour after breakfast: 140 mg/dL or below   1 hour after lunch: 140 mg/dL or below   1 hour after dinner: 140 mg/dL or below     Please bring your meter and log book to all appointments     If you miss 1 hour after meal test, test 2 hours after the meal.  Goal 2 hours after is 120 mg/dL or below.     2.  Check your urine ketones once a  week, when you first awake for the day.  Goal is negative or trace.    3.  Meal Plan    Breakfast: 30 grams carbohydrate + protein   Snack: 15-30 grams carbohydrate + protein  Lunch: 45-60 grams carbohydrate + protein  Snack: 15-30 grams carbohydrate + protein  Dinner: 45-60 grams carbohydrate + protein  Snack: 15-30 grams carbohydrate + protein    A few tips:   -consume some carbohydrate every 2-3 hours while awake   -you need a minimum of 175 grams of carbohydrate per day   -fruit and cold breakfast cereal are best tolerated at lunch or later   -protein includes: cheese, eggs, fish, nuts, nut butter, chicken, turkey, beef, and pork   -snack ideas: an individual container of Greek yogurt (try Chobani Less Sugar), whole grain crackers and cheese, chocolate fairlife milk, a Kashi or KIND bar, a baseball size piece of whole fruit + nut butter (apple + peanut butter), fruit canned in it's own juice + cottage cheese    4.  Aim for 20-30 minutes of activity most days of the week (with the okay of your OB provider).      5. Follow up:    -Monday, June 5, 2023 via [employer] with blood sugars and urine ketones   -Monday, June 19, 2023 with Beckie (virtual visit)    6.  Call Diabetes Education at [phone] or send a [employer] message with:   -questions or concerns   -ketones that are small, moderate, or large   -3 or more blood sugars above target in a 7 day period  Beckie Stephenson, MPH, RD, CDCES, LD 5/22/2023    Time Spent: 20 minutes    Any diabetes medication dose changes were made via the CDE Protocol and Collaborative Practice Agreement with the patient's referring provider. A copy of this encounter was shared with the provider.

## 2023-05-22 NOTE — LETTER
5/22/2023         RE: Melissa Salinas  [address] [address]        Dear Colleague,    Thank you for referring your patient, Melissa Salinas, to the [facility]. Please see a copy of my visit note below.    Diabetes and Pregnancy Follow-up  Type of Service: Telephone Visit/ 20 minutes     Originating Location (Patient Location): [place], [place]  Distant Location (Provider Location): [place] [facility]  Mode of Communication:  Telephone     Telephone Visit Start Time: 2:03 PM  Telephone Visit End Time (telephone visit stop time): 2:23 PM     How would patient like to obtain AVS? MyChart      Subjective/Objective:    Melissa Salinas was called for a scheduled BG review. Last date of communication was: 5/10/23.    Gestational diabetes is being managed with diet and activity    Taking diabetes medications: no    Estimated Date of Delivery: Oct 23, 2023    Blood Glucose/Ketone Log:    Date Ketones Fasting Post Breakfast Post Lunch Post Supper   5/10  95 120 138 97   5/11  88 104 93 123   5/12  88 133 109 130   5/13  87 123 103 112   5/14   93 118    5/15 negative 90 122 122 115   5/16  91 114 97 106   5/17  88 123 99 128   5/18  90 111 110 113   5/19  94 134 123 130   5/20  89 153 115 117   5/21  88 101 136 120   5/22 negative 86 116 95    (testing 1 hour from start of meal)    Assessment:  Ketones: negative x2.   Fasting blood glucoses: 100% in target.  After breakfast: 92% in target.  Before lunch: n/a% in target.  After lunch: 100% in target.  Before dinner: n/a% in target.  After dinner: 100% in target.    Affirmed patient's hard work. Reviewed how insulin needs change during pregnancy.  Reviewed impact of placental hormones on insulin resistance. Provided encouragement.    Plan/Response:  1. Test glucose 4 times per day:   Fasting (when you first awake for the day): 95 mg/dL or below   1 hour after breakfast: 140 mg/dL or below   1 hour after lunch: 140 mg/dL or below   1  hour after dinner: 140 mg/dL or below     Please bring your meter and log book to all appointments     If you miss 1 hour after meal test, test 2 hours after the meal.  Goal 2 hours after is 120 mg/dL or below.     2.  Check your urine ketones once a week, when you first awake for the day.  Goal is negative or trace.    3.  Meal Plan    Breakfast: 30 grams carbohydrate + protein   Snack: 15-30 grams carbohydrate + protein  Lunch: 45-60 grams carbohydrate + protein  Snack: 15-30 grams carbohydrate + protein  Dinner: 45-60 grams carbohydrate + protein  Snack: 15-30 grams carbohydrate + protein    A few tips:   -consume some carbohydrate every 2-3 hours while awake   -you need a minimum of 175 grams of carbohydrate per day   -fruit and cold breakfast cereal are best tolerated at lunch or later   -protein includes: cheese, eggs, fish, nuts, nut butter, chicken, turkey, beef, and pork   -snack ideas: an individual container of Greek yogurt (try Chobani Less Sugar), whole grain crackers and cheese, chocolate fairlife milk, a Kashi or KIND bar, a baseball size piece of whole fruit + nut butter (apple + peanut butter), fruit canned in it's own juice + cottage cheese    4.  Aim for 20-30 minutes of activity most days of the week (with the okay of your OB provider).      5. Follow up:    -Monday, June 5, 2023 via [employer] with blood sugars and urine ketones   -Monday, June 19, 2023 with Beckie (virtual visit)    6.  Call Diabetes Education at [phone] or send a [employer] message with:   -questions or concerns   -ketones that are small, moderate, or large   -3 or more blood sugars above target in a 7 day period  Beckie Stephenson, MPH, RD, CDCES, LD 5/22/2023    Time Spent: 20 minutes    Any diabetes medication dose changes were made via the CDE Protocol and Collaborative Practice Agreement with the patient's referring provider. A copy of this encounter was shared with the provider.

## 2023-05-22 NOTE — PATIENT INSTRUCTIONS
Test glucose 4 times per day:   Fasting (when you first awake for the day): 95 mg/dL or below   1 hour after breakfast: 140 mg/dL or below   1 hour after lunch: 140 mg/dL or below   1 hour after dinner: 140 mg/dL or below     Please bring your meter and log book to all appointments     If you miss 1 hour after meal test, test 2 hours after the meal.  Goal 2 hours after is 120 mg/dL or below.     2.  Check your urine ketones once a week, when you first awake for the day.  Goal is negative or trace.    3.  Meal Plan    Breakfast: 30 grams carbohydrate + protein   Snack: 15-30 grams carbohydrate + protein  Lunch: 45-60 grams carbohydrate + protein  Snack: 15-30 grams carbohydrate + protein  Dinner: 45-60 grams carbohydrate + protein  Snack: 15-30 grams carbohydrate + protein    A few tips:   -consume some carbohydrate every 2-3 hours while awake   -you need a minimum of 175 grams of carbohydrate per day   -fruit and cold breakfast cereal are best tolerated at lunch or later   -protein includes: cheese, eggs, fish, nuts, nut butter, chicken, turkey, beef, and pork   -snack ideas: an individual container of Greek yogurt (try Chobani Less Sugar), whole grain crackers and cheese, chocolate fairlife milk, a Kashi or KIND bar, a baseball size piece of whole fruit + nut butter (apple + peanut butter), fruit canned in it's own juice + cottage cheese    4.  Aim for 20-30 minutes of activity most days of the week (with the okay of your OB provider).      5. Follow up:    -Monday, June 5, 2023 via [employer] with blood sugars and urine ketones   -Monday, June 19, 2023 with Beckie (virtual visit)    6.  Call Diabetes Education at [phone] or send a [employer] message with:   -questions or concerns   -ketones that are small, moderate, or large   -3 or more blood sugars above target in a 7 day period    Thank you,     Beckie Stephenson, MPH, RD, CDCES, LD 5/22/2023

## 2023-05-25 ENCOUNTER — ALLIED HEALTH/NURSE VISIT (OUTPATIENT)
Dept: ALLERGY | Facility: CLINIC | Age: 33
End: 2023-05-25
Payer: COMMERCIAL

## 2023-05-25 DIAGNOSIS — J30.89 ALLERGIC RHINITIS CAUSED BY MOLD: ICD-10-CM

## 2023-05-25 DIAGNOSIS — J30.1 SEASONAL ALLERGIC RHINITIS DUE TO POLLEN: ICD-10-CM

## 2023-05-25 DIAGNOSIS — J30.81 ALLERGIC RHINITIS DUE TO ANIMALS: Primary | ICD-10-CM

## 2023-05-25 PROCEDURE — 95117 IMMUNOTHERAPY INJECTIONS: CPT

## 2023-05-25 NOTE — PROGRESS NOTES
Melissa Salinas presents to clinic today at the request of Isis Lagos MD (ordering provider) for Allergy Immunotherapy injection(s).       This service provided today was under the care of Isis Lagos MD; the supervising provider of the day; who was available if needed.      Patient presented after waiting 30 minutes with no reaction to  injections. Discharged from clinic.    Darline Parish RN

## 2023-06-05 ENCOUNTER — MYC MEDICAL ADVICE (OUTPATIENT)
Dept: EDUCATION SERVICES | Facility: CLINIC | Age: 33
End: 2023-06-05
Payer: COMMERCIAL

## 2023-06-06 NOTE — TELEPHONE ENCOUNTER
Gestational Diabetes Follow-up    Subjective/Objective:    Melissa Salinas sent in blood glucose log for review. Last date of communication was: .    Gestational diabetes is being managed with diet and activity    Taking diabetes medications: No    Estimated Date of Delivery: Oct 23, 2023    BG/Food Log:   Hi, I ve attached my blood sugar numbers for the last couple weeks. I had a few high numbers in the morning before breakfast, and that week I had sweets in the evening more than I had been and not with another snack. So I assume that explains the higher numbers first thing in the morning. There were a couple other higher readings, and I am finding that having certain potato foods seem to end up with a higher sugar reading. I had more high readings than I usually do and I just had a little bit more of trying foods to see if they would work for me, and I think I am still feeling alright as I work this out and see what is working for me.           Assessment:    Ketones: neg.   Fasting blood glucoses: 87% in target.  After breakfast: 87% in target.  Before lunch: x% in target.  After lunch: 100% in target.  Before dinner: x% in target.  After dinner: 100% in target.    Plan/Response:  No changes in the patient's current treatment plan.  Follow-up in 1 week.    Tonie Mcbride RD [facility]  Triage: [phone]  [phone]      Any diabetes medication dose changes were made via the CDE Protocol and Collaborative Practice Agreement with the patient's referring provider. A copy of this encounter was shared with the provider.

## 2023-06-19 ENCOUNTER — VIRTUAL VISIT (OUTPATIENT)
Dept: EDUCATION SERVICES | Facility: CLINIC | Age: 33
End: 2023-06-19
Payer: COMMERCIAL

## 2023-06-19 DIAGNOSIS — O24.419 GESTATIONAL DIABETES MELLITUS (GDM) IN SECOND TRIMESTER, GESTATIONAL DIABETES METHOD OF CONTROL UNSPECIFIED: Primary | ICD-10-CM

## 2023-06-19 PROCEDURE — 98967 PH1 ASSMT&MGMT NQHP 11-20: CPT | Mod: 93

## 2023-06-19 NOTE — PATIENT INSTRUCTIONS
Test glucose 4 times per day:              Fasting (when you first awake for the day): 95 mg/dL or below              1 hour after breakfast: 140 mg/dL or below              1 hour after lunch: 140 mg/dL or below              1 hour after dinner: 140 mg/dL or below                 Please bring your meter and log book to all appointments                 If you miss 1 hour after meal test, test 2 hours after the meal.  Goal 2 hours after is 120 mg/dL or below.      2.  Check your urine ketones once a week, when you first awake for the day.  Goal is negative or trace.     3.  Meal Plan     Breakfast: 30 grams carbohydrate + protein   Snack: 15-30 grams carbohydrate + protein  Lunch: 45-60 grams carbohydrate + protein  Snack: 15-30 grams carbohydrate + protein  Dinner: 45-60 grams carbohydrate + protein  Snack: 15-30 grams carbohydrate + protein (try 1 cup chocolate Fairlife milk)     A few tips:              -consume some carbohydrate every 2-3 hours while awake              -you need a minimum of 175 grams of carbohydrate per day              -fruit and cold breakfast cereal are best tolerated at lunch or later              -protein includes: cheese, eggs, fish, nuts, nut butter, chicken, turkey, beef, and pork              -snack ideas: an individual container of Greek yogurt (try Chobani Less Sugar), whole grain crackers and cheese, chocolate fairlife milk, a Kashi or KIND bar, a baseball size piece of whole fruit + nut butter (apple + peanut butter), fruit canned in it's own juice + cottage cheese     4.  Aim for 20-30 minutes of activity most days of the week (with the okay of your OB provider).       5. Follow up:               -Monday, July 3, 2023 via [employer] with blood sugars and urine ketones              -Monday, July 17, 2023 with Beckie (virtual visit) at 12:30 PM     6.  Call Diabetes Education at [phone] or send a [employer] message with:              -questions or concerns              -ketones that are  small, moderate, or large              -3 or more blood sugars above target in a 7 day period    Thank you,   Beckie Stephenson, MPH, RD, CDCES, LD 6/19/2023

## 2023-06-19 NOTE — LETTER
6/19/2023         RE: Melissa Salinas  [address] [facility] [address]        Dear Colleague,    Thank you for referring your patient, Melissa Salinas, to the [facility]. Please see a copy of my visit note below.    Diabetes and Pregnancy Follow-up  Type of Service: Video Visit/ 18minutes     Originating Location (Patient Location): Home  Distant Location (Provider Location): [place] [facility]  Mode of Communication:  Telephone     Video Visit Start Time: 12:31 PM  Video Visit End Time (telephone visit stop time): 12:49 PM     How would patient like to obtain AVS? MyChart      Subjective/Objective:    Melissa Salinas connected for a scheduled BG review. Last date of communication was: 6/6/23.    Gestational diabetes is being managed with diet and activity    Taking diabetes medications: no    Estimated Date of Delivery: Oct 23, 2023    Blood Glucose/Ketone Log:    Date Ketones Fasting Post Breakfast Post Lunch Post Supper   6/5  92 98 109 126   6/6  96 109 109 130   6/7  94 111  88   6/8  93 99* 165 (dessert) 116   6/9  92 121 101 108   6/10  96 121 140 120   6/11  90 134 102 108   6/12 negative 96 110 118 128   6/13  92 123 112 131   6/14  88 114 108* 118   6/15  93 125 120 111   6/16  97 115     6/17  93 122 137 105   6/18  91 133 129 122   6/19 negative 82 129     (testing 1 hour from start of meal)    Assessment:  Ketones: negative x2.   Fasting blood glucoses: 73% in target.  After breakfast: 100% in target.  Before lunch: n/a% in target.  After lunch: 92% in target.  Before dinner: n/a% in target.  After dinner: 100% in target.    Affirmed patient's hard work. Reviewed how insulin needs change during pregnancy.  Reviewed impact of placental hormones on insulin resistance.  Not consistently having HS snack and writer recommended she start IF she goes to bed 2 or more hours from the start of her evening meal.     Plan/Response:  1. Test glucose 4 times per day:               Fasting (when you first awake for the day): 95 mg/dL or below              1 hour after breakfast: 140 mg/dL or below              1 hour after lunch: 140 mg/dL or below              1 hour after dinner: 140 mg/dL or below                 Please bring your meter and log book to all appointments                 If you miss 1 hour after meal test, test 2 hours after the meal.  Goal 2 hours after is 120 mg/dL or below.      2.  Check your urine ketones once a week, when you first awake for the day.  Goal is negative or trace.     3.  Meal Plan     Breakfast: 30 grams carbohydrate + protein   Snack: 15-30 grams carbohydrate + protein  Lunch: 45-60 grams carbohydrate + protein  Snack: 15-30 grams carbohydrate + protein  Dinner: 45-60 grams carbohydrate + protein  Snack: 15-30 grams carbohydrate + protein (try 1 cup chocolate Fairlife milk)     A few tips:              -consume some carbohydrate every 2-3 hours while awake              -you need a minimum of 175 grams of carbohydrate per day              -fruit and cold breakfast cereal are best tolerated at lunch or later              -protein includes: cheese, eggs, fish, nuts, nut butter, chicken, turkey, beef, and pork              -snack ideas: an individual container of Greek yogurt (try Chobani Less Sugar), whole grain crackers and cheese, chocolate fairlife milk, a Kashi or KIND bar, a baseball size piece of whole fruit + nut butter (apple + peanut butter), fruit canned in it's own juice + cottage cheese     4.  Aim for 20-30 minutes of activity most days of the week (with the okay of your OB provider).       5. Follow up:               -Monday, July 3, 2023 via [employer]text with blood sugars and urine ketones              -Monday, July 17, 2023 with Beckie (virtual visit) at 12:30 PM     6.  Call Diabetes Education at [phone] or send a Symtext message with:              -questions or concerns              -ketones that are small, moderate, or large              -3  or more blood sugars above target in a 7 day period    Beckie Stephenson, MPH, RD, CDCES, LD 6/19/2023    Time Spent: 18 minutes    Any diabetes medication dose changes were made via the CDE Protocol and Collaborative Practice Agreement with the patient's referring provider. A copy of this encounter was shared with the provider.

## 2023-06-19 NOTE — PROGRESS NOTES
Diabetes and Pregnancy Follow-up  Type of Service: Video Visit/ 18minutes     Originating Location (Patient Location): Home  Distant Location (Provider Location): [place] [facility]  Mode of Communication:  Telephone     Video Visit Start Time: 12:31 PM  Video Visit End Time (telephone visit stop time): 12:49 PM     How would patient like to obtain AVS? Shadihart      Subjective/Objective:    Melissa Salinas connected for a scheduled BG review. Last date of communication was: 6/6/23.    Gestational diabetes is being managed with diet and activity    Taking diabetes medications: no    Estimated Date of Delivery: Oct 23, 2023    Blood Glucose/Ketone Log:    Date Ketones Fasting Post Breakfast Post Lunch Post Supper   6/5  92 98 109 126   6/6  96 109 109 130   6/7  94 111  88   6/8  93 99* 165 (dessert) 116   6/9  92 121 101 108   6/10  96 121 140 120   6/11  90 134 102 108   6/12 negative 96 110 118 128   6/13  92 123 112 131   6/14  88 114 108* 118   6/15  93 125 120 111   6/16  97 115     6/17  93 122 137 105   6/18  91 133 129 122   6/19 negative 82 129     (testing 1 hour from start of meal)    Assessment:  Ketones: negative x2.   Fasting blood glucoses: 73% in target.  After breakfast: 100% in target.  Before lunch: n/a% in target.  After lunch: 92% in target.  Before dinner: n/a% in target.  After dinner: 100% in target.    Affirmed patient's hard work. Reviewed how insulin needs change during pregnancy.  Reviewed impact of placental hormones on insulin resistance.  Not consistently having HS snack and writer recommended she start IF she goes to bed 2 or more hours from the start of her evening meal.     Plan/Response:  1. Test glucose 4 times per day:              Fasting (when you first awake for the day): 95 mg/dL or below              1 hour after breakfast: 140 mg/dL or below              1 hour after lunch: 140 mg/dL or below              1 hour after dinner: 140 mg/dL or below                 Please bring  your meter and log book to all appointments                 If you miss 1 hour after meal test, test 2 hours after the meal.  Goal 2 hours after is 120 mg/dL or below.      2.  Check your urine ketones once a week, when you first awake for the day.  Goal is negative or trace.     3.  Meal Plan     Breakfast: 30 grams carbohydrate + protein   Snack: 15-30 grams carbohydrate + protein  Lunch: 45-60 grams carbohydrate + protein  Snack: 15-30 grams carbohydrate + protein  Dinner: 45-60 grams carbohydrate + protein  Snack: 15-30 grams carbohydrate + protein (try 1 cup chocolate Fairlife milk)     A few tips:              -consume some carbohydrate every 2-3 hours while awake              -you need a minimum of 175 grams of carbohydrate per day              -fruit and cold breakfast cereal are best tolerated at lunch or later              -protein includes: cheese, eggs, fish, nuts, nut butter, chicken, turkey, beef, and pork              -snack ideas: an individual container of Greek yogurt (try Chobani Less Sugar), whole grain crackers and cheese, chocolate fairlife milk, a Kashi or KIND bar, a baseball size piece of whole fruit + nut butter (apple + peanut butter), fruit canned in it's own juice + cottage cheese     4.  Aim for 20-30 minutes of activity most days of the week (with the okay of your OB provider).       5. Follow up:               -Monday, July 3, 2023 via [employer] with blood sugars and urine ketones              -Monday, July 17, 2023 with Beckie (virtual visit) at 12:30 PM     6.  Call Diabetes Education at [phone] or send a Lelong message with:              -questions or concerns              -ketones that are small, moderate, or large              -3 or more blood sugars above target in a 7 day period    Beckie Stephenson, MPH, RD, CDCES, LD 6/19/2023    Time Spent: 18 minutes    Any diabetes medication dose changes were made via the CDE Protocol and Collaborative Practice Agreement with the patient's  referring provider. A copy of this encounter was shared with the provider.

## 2023-06-22 ENCOUNTER — ALLIED HEALTH/NURSE VISIT (OUTPATIENT)
Dept: ALLERGY | Facility: CLINIC | Age: 33
End: 2023-06-22
Payer: COMMERCIAL

## 2023-06-22 DIAGNOSIS — J30.1 SEASONAL ALLERGIC RHINITIS DUE TO POLLEN: ICD-10-CM

## 2023-06-22 DIAGNOSIS — J30.89 ALLERGIC RHINITIS CAUSED BY MOLD: ICD-10-CM

## 2023-06-22 DIAGNOSIS — J30.81 ALLERGIC RHINITIS DUE TO ANIMALS: Primary | ICD-10-CM

## 2023-06-22 PROCEDURE — 95117 IMMUNOTHERAPY INJECTIONS: CPT

## 2023-07-03 ENCOUNTER — MYC MEDICAL ADVICE (OUTPATIENT)
Dept: EDUCATION SERVICES | Facility: CLINIC | Age: 33
End: 2023-07-03
Payer: COMMERCIAL

## 2023-07-03 NOTE — TELEPHONE ENCOUNTER
Gestational Diabetes Follow-up    Subjective/Objective:    Melissa Salinas sent in blood glucose log for review. Last date of communication was: 6/19/23.    Gestational diabetes is being managed with diet and activity    Taking diabetes medications: no    Estimated Date of Delivery: Oct 23, 2023    BG/Food Log:         Assessment:    Ketones: neg.   Fasting blood glucoses: 86% in target.  After breakfast: 100% in target.  Before lunch: -% in target.  After lunch: 100% in target.  Before dinner: -% in target.  After dinner: 93% in target.    Plan/Response:  No changes in the patient's current treatment plan.  Follow-up in 2 weeks - video visit scheduled on 7/17  See YuanV message for patient communications.       Erika Gonsalves RD, LD, CDCES     Any diabetes medication dose changes were made via the CDE Protocol and Collaborative Practice Agreement with the patient's OB/GYN provider. A copy of this encounter was shared with the provider.

## 2023-07-16 ENCOUNTER — MYC MEDICAL ADVICE (OUTPATIENT)
Dept: EDUCATION SERVICES | Facility: CLINIC | Age: 33
End: 2023-07-16
Payer: COMMERCIAL

## 2023-07-17 ENCOUNTER — VIRTUAL VISIT (OUTPATIENT)
Dept: EDUCATION SERVICES | Facility: CLINIC | Age: 33
End: 2023-07-17
Payer: COMMERCIAL

## 2023-07-17 DIAGNOSIS — O24.419 GESTATIONAL DIABETES MELLITUS (GDM) IN SECOND TRIMESTER, GESTATIONAL DIABETES METHOD OF CONTROL UNSPECIFIED: Primary | ICD-10-CM

## 2023-07-17 PROCEDURE — 98968 PH1 ASSMT&MGMT NQHP 21-30: CPT | Mod: 93

## 2023-07-17 NOTE — CONFIDENTIAL NOTE
Beckie,    Records for pt appt with you today. Thanks!    Mellisa Pisano RN, [facility]

## 2023-07-17 NOTE — LETTER
7/17/2023         RE: Melissa Salinas  [address] [facility] [facility] [address]        Dear Colleague,    Thank you for referring your patient, Melissa Salinas, to the [facility]. Please see a copy of my visit note below.    Diabetes and Pregnancy Follow-up  Type of Service: Video Visit/ 23 minutes     Originating Location (Patient Location): [place], [place]  Distant Location (Provider Location): [place] [facility]  Mode of Communication:  Telephone     Video Visit Start Time: 12:31 PM  Video Visit End Time (telephone visit stop time): 12:54 PM     How would patient like to obtain AVS? MyChart    Subjective/Objective:    Melissa Salinas was called for a scheduled BG review. Last date of communication was: 7/3/23.    Gestational diabetes is being managed with diet and activity    Taking diabetes medications: no    Estimated Date of Delivery: Oct 23, 2023    Blood Glucose/Ketone Log:      (testing 1 hour post meal unless noted)    Assessment:  Ketones: negative x2.   Fasting blood glucoses: 86% in target.  After breakfast: 92% in target.  Before lunch: n/a% in target.  After lunch: 100% in target.  Before dinner: n/a% in target.  After dinner: 93% in target.    Patient is not surprised that foods higher in simple sugars caused above target glucoses.  Missing having breakfast foods which require syrup.  Suggested trying a higher protein pancake/waffle mix such as the [place] Power Cake mix at evening meal and topping with berries and whip cream in place of syrup.  Discussed the importance of not only counting carbohydrates and also the quality of the carbohydrates (choosing complex (higher fiber) options most often) as well as the quality of other food choices.  In addition to impacting glucoses, discussed how food choices impact maternal weight gain.  Encouraged patient to discuss and review her weight gain this pregnancy with her OB provider.     Plan/Response:  1. Test glucose 4 times per  day:              Fasting (when you first awake for the day): 95 mg/dL or below              1 hour after breakfast: 140 mg/dL or below              1 hour after lunch: 140 mg/dL or below              1 hour after dinner: 140 mg/dL or below                 Please bring your meter and log book to all appointments                 If you miss 1 hour after meal test, test 2 hours after the meal.  Goal 2 hours after is 120 mg/dL or below.      2.  Check your urine ketones once a week, when you first awake for the day.  Goal is negative or trace.     3.  Meal Plan     Breakfast: 30 grams carbohydrate + protein   Snack: 15-30 grams carbohydrate + protein  Lunch: 45-60 grams carbohydrate + protein  Snack: 15-30 grams carbohydrate + protein  Dinner: 45-60 grams carbohydrate + protein  Snack: 15-30 grams carbohydrate + protein (try 1 cup chocolate Fairlife milk)     A few tips:              -consume some carbohydrate every 2-3 hours while awake              -you need a minimum of 175 grams of carbohydrate per day              -fruit and cold breakfast cereal are best tolerated at lunch or later              -protein includes: cheese, eggs, fish, nuts, nut butter, chicken, turkey, beef, and pork              -snack ideas: an individual container of Greek yogurt (try Chobani Less Sugar), whole grain crackers and cheese, chocolate fairlife milk, a Kashi or KIND bar, a baseball size piece of whole fruit + nut butter (apple + peanut butter), fruit canned in it's own juice + cottage cheese     4.  Aim for 20-30 minutes of activity most days of the week (with the okay of your OB provider).       5. Follow up:               -Monday, July 31, 2023 via [employer] with blood sugars and urine ketones              -Monday, August 14, 2023 with Beckie (virtual visit) at 12:30 PM     6.  Call Diabetes Education at [phone] or send a Blockboard message with:              -questions or concerns              -ketones that are small, moderate, or  large              -3 or more blood sugars above target in a 7 day period    Beckie Stephenson, MPH, RD, CDCES, LD 7/17/2023    Time Spent: 23 minutes    Any diabetes medication dose changes were made via the CDE Protocol and Collaborative Practice Agreement with the patient's referring provider. A copy of this encounter was shared with the provider.

## 2023-07-17 NOTE — PROGRESS NOTES
Diabetes and Pregnancy Follow-up  Type of Service: Video Visit/ 23 minutes     Originating Location (Patient Location): [place], [place]  Distant Location (Provider Location): [place] [facility]  Mode of Communication:  Telephone     Video Visit Start Time: 12:31 PM  Video Visit End Time (telephone visit stop time): 12:54 PM     How would patient like to obtain AVS? Phil    Subjective/Objective:    Melissa Salinas was called for a scheduled BG review. Last date of communication was: 7/3/23.    Gestational diabetes is being managed with diet and activity    Taking diabetes medications: no    Estimated Date of Delivery: Oct 23, 2023    Blood Glucose/Ketone Log:      (testing 1 hour post meal unless noted)    Assessment:  Ketones: negative x2.   Fasting blood glucoses: 86% in target.  After breakfast: 92% in target.  Before lunch: n/a% in target.  After lunch: 100% in target.  Before dinner: n/a% in target.  After dinner: 93% in target.    Patient is not surprised that foods higher in simple sugars caused above target glucoses.  Missing having breakfast foods which require syrup.  Suggested trying a higher protein pancake/waffle mix such as the [place] Power Cake mix at evening meal and topping with berries and whip cream in place of syrup.  Discussed the importance of not only counting carbohydrates and also the quality of the carbohydrates (choosing complex (higher fiber) options most often) as well as the quality of other food choices.  In addition to impacting glucoses, discussed how food choices impact maternal weight gain.  Encouraged patient to discuss and review her weight gain this pregnancy with her OB provider.     Plan/Response:  1. Test glucose 4 times per day:              Fasting (when you first awake for the day): 95 mg/dL or below              1 hour after breakfast: 140 mg/dL or below              1 hour after lunch: 140 mg/dL or below              1 hour after dinner: 140 mg/dL or below                  Please bring your meter and log book to all appointments                 If you miss 1 hour after meal test, test 2 hours after the meal.  Goal 2 hours after is 120 mg/dL or below.      2.  Check your urine ketones once a week, when you first awake for the day.  Goal is negative or trace.     3.  Meal Plan     Breakfast: 30 grams carbohydrate + protein   Snack: 15-30 grams carbohydrate + protein  Lunch: 45-60 grams carbohydrate + protein  Snack: 15-30 grams carbohydrate + protein  Dinner: 45-60 grams carbohydrate + protein  Snack: 15-30 grams carbohydrate + protein (try 1 cup chocolate Fairlife milk)     A few tips:              -consume some carbohydrate every 2-3 hours while awake              -you need a minimum of 175 grams of carbohydrate per day              -fruit and cold breakfast cereal are best tolerated at lunch or later              -protein includes: cheese, eggs, fish, nuts, nut butter, chicken, turkey, beef, and pork              -snack ideas: an individual container of Greek yogurt (try Chobani Less Sugar), whole grain crackers and cheese, chocolate fairlife milk, a Kashi or KIND bar, a baseball size piece of whole fruit + nut butter (apple + peanut butter), fruit canned in it's own juice + cottage cheese     4.  Aim for 20-30 minutes of activity most days of the week (with the okay of your OB provider).       5. Follow up:               -Monday, July 31, 2023 via [employer] with blood sugars and urine ketones              -Monday, August 14, 2023 with Beckie (virtual visit) at 12:30 PM     6.  Call Diabetes Education at [phone] or send a [employer] message with:              -questions or concerns              -ketones that are small, moderate, or large              -3 or more blood sugars above target in a 7 day period    Beckie Stephenson, MPH, RD, CDCES, LD 7/17/2023    Time Spent: 23 minutes    Any diabetes medication dose changes were made via the CDE Protocol and Collaborative Practice Agreement  with the patient's referring provider. A copy of this encounter was shared with the provider.

## 2023-07-18 NOTE — TELEPHONE ENCOUNTER
Patient sent in glucoses for review.  Please see visit encounter dated 7/17/23 for assessment.   Beckie Stephenson, MPH, RD, CDCES, LD 7/17/2023

## 2023-07-18 NOTE — PATIENT INSTRUCTIONS
Test glucose 4 times per day:              Fasting (when you first awake for the day): 95 mg/dL or below              1 hour after breakfast: 140 mg/dL or below              1 hour after lunch: 140 mg/dL or below              1 hour after dinner: 140 mg/dL or below                 Please bring your meter and log book to all appointments                 If you miss 1 hour after meal test, test 2 hours after the meal.  Goal 2 hours after is 120 mg/dL or below.      2.  Check your urine ketones once a week, when you first awake for the day.  Goal is negative or trace.     3.  Meal Plan     Breakfast: 30 grams carbohydrate + protein   Snack: 15-30 grams carbohydrate + protein  Lunch: 45-60 grams carbohydrate + protein  Snack: 15-30 grams carbohydrate + protein  Dinner: 45-60 grams carbohydrate + protein  Snack: 15-30 grams carbohydrate + protein (try 1 cup chocolate Fairlife milk)     A few tips:              -consume some carbohydrate every 2-3 hours while awake              -you need a minimum of 175 grams of carbohydrate per day              -fruit and cold breakfast cereal are best tolerated at lunch or later              -protein includes: cheese, eggs, fish, nuts, nut butter, chicken, turkey, beef, and pork              -snack ideas: an individual container of Greek yogurt (try Chobani Less Sugar), whole grain crackers and cheese, chocolate fairlife milk, a Kashi or KIND bar, a baseball size piece of whole fruit + nut butter (apple + peanut butter), fruit canned in it's own juice + cottage cheese     4.  Aim for 20-30 minutes of activity most days of the week (with the okay of your OB provider).       5. Follow up:               -Monday, July 31, 2023 via [employer] with blood sugars and urine ketones              -Monday, August 14, 2023 with Beckie (virtual visit) at 12:30 PM     6.  Call Diabetes Education at [phone] or send a Confer message with:              -questions or concerns              -ketones that  are small, moderate, or large              -3 or more blood sugars above target in a 7 day period    Thank you,   Beckie Stephenson, MPH, RD, CDCES, LD 7/17/2023

## 2023-07-20 ENCOUNTER — ALLIED HEALTH/NURSE VISIT (OUTPATIENT)
Dept: ALLERGY | Facility: CLINIC | Age: 33
End: 2023-07-20
Payer: COMMERCIAL

## 2023-07-20 DIAGNOSIS — J30.89 ALLERGIC RHINITIS CAUSED BY MOLD: ICD-10-CM

## 2023-07-20 DIAGNOSIS — J30.81 ALLERGIC RHINITIS DUE TO ANIMALS: Primary | ICD-10-CM

## 2023-07-20 PROCEDURE — 95117 IMMUNOTHERAPY INJECTIONS: CPT

## 2023-07-20 NOTE — PROGRESS NOTES
Melissa Salinas presents to clinic today at the request of Isis Lagos MD (ordering provider) for Allergy Immunotherapy injection(s).       This service provided today was under the care of Lesia Wise MD; the supervising provider of the day; who was available if needed.      Patient presented after waiting 30 minutes with no reaction to  injections. Discharged from clinic.    Darline Parish RN

## 2023-07-31 ENCOUNTER — MYC MEDICAL ADVICE (OUTPATIENT)
Dept: EDUCATION SERVICES | Facility: CLINIC | Age: 33
End: 2023-07-31
Payer: COMMERCIAL

## 2023-08-01 NOTE — TELEPHONE ENCOUNTER
Gestational Diabetes Follow-up    Subjective/Objective:    Melissa Salinas sent in blood glucose log for review. Last date of communication was: 7/17/23.    Gestational diabetes is being managed with diet and activity    Taking diabetes medications: no    Estimated Date of Delivery: Oct 23, 2023    BG/Food Log:       Assessment:  Blood sugars in target with current food plan.  Some elevated readings due to high carbohydrate meals.     Ketones: negative.   Fasting blood glucoses: 86% in target.  After breakfast: 93% in target.  Before lunch: -% in target.  After lunch: 100% in target.  Before dinner: -% in target.  After dinner: 86% in target.    Plan/Response:  No changes in the patient's current treatment plan.  Follow-up in 1 week.    Hilary Mcelroy MS, RD, LD, CDE      Any diabetes medication dose changes were made via the CDE Protocol and Collaborative Practice Agreement with the patient's OB/GYN provider. A copy of this encounter was shared with the provider.

## 2023-08-02 ENCOUNTER — LAB REQUISITION (OUTPATIENT)
Dept: LAB | Facility: CLINIC | Age: 33
End: 2023-08-02

## 2023-08-02 DIAGNOSIS — Z01.83 ENCOUNTER FOR BLOOD TYPING: ICD-10-CM

## 2023-08-02 DIAGNOSIS — Z3A.28 28 WEEKS GESTATION OF PREGNANCY: ICD-10-CM

## 2023-08-02 LAB — HGB BLD-MCNC: 12 G/DL (ref 11.7–15.7)

## 2023-08-02 PROCEDURE — 86850 RBC ANTIBODY SCREEN: CPT | Performed by: NURSE PRACTITIONER

## 2023-08-02 PROCEDURE — 85018 HEMOGLOBIN: CPT | Performed by: NURSE PRACTITIONER

## 2023-08-02 PROCEDURE — 86780 TREPONEMA PALLIDUM: CPT | Performed by: NURSE PRACTITIONER

## 2023-08-03 LAB
ANTIBODY SCREEN: NEGATIVE
SPECIMEN EXPIRATION DATE: NORMAL
T PALLIDUM AB SER QL: NONREACTIVE

## 2023-08-13 ENCOUNTER — MYC MEDICAL ADVICE (OUTPATIENT)
Dept: EDUCATION SERVICES | Facility: CLINIC | Age: 33
End: 2023-08-13
Payer: COMMERCIAL

## 2023-08-14 ENCOUNTER — VIRTUAL VISIT (OUTPATIENT)
Dept: EDUCATION SERVICES | Facility: CLINIC | Age: 33
End: 2023-08-14
Payer: COMMERCIAL

## 2023-08-14 DIAGNOSIS — O24.419 GESTATIONAL DIABETES MELLITUS (GDM) IN SECOND TRIMESTER, GESTATIONAL DIABETES METHOD OF CONTROL UNSPECIFIED: Primary | ICD-10-CM

## 2023-08-14 DIAGNOSIS — O24.410 DIET CONTROLLED GESTATIONAL DIABETES MELLITUS (GDM) IN THIRD TRIMESTER: ICD-10-CM

## 2023-08-14 PROCEDURE — 99207 PR NONPHYSICIAN TELEPHONE ASSESSMENT 11-20 MIN: CPT | Mod: VID

## 2023-08-14 NOTE — LETTER
8/14/2023         RE: Melissa Salinas  [address]  [facility] [address]        Dear Colleague,    Thank you for referring your patient, Melissa Salinas, to the [facility]. Please see a copy of my visit note below.    Diabetes and Pregnancy Follow-up  Type of Service: Video Visit/ 20 minutes     Originating Location (Patient Location): Home  [place] [facility]  Mode of Communication:  Video     Video Visit Start Time: 12:34 PM  Video Visit End Time (telephone visit stop time): 12:54 PM     How would patient like to obtain AVS? not needed      Subjective/Objective:    Melissa Salinas was called for a scheduled BG review. Last date of communication was: 8/1/23.    Gestational diabetes is being managed with diet and activity    Taking diabetes medications: no    Estimated Date of Delivery: Oct 23, 2023    Blood Glucose/Ketone Log:    From 8/14/23: 92 (fasting), 96 (post breakfast)  (Testing 1 hour post meals)    Assessment:  Ketones: negative x1.   Fasting blood glucoses: 79% in target.  After breakfast: 100% in target.  Before lunch: n/a% in target.  After lunch: 85% in target.  Before dinner: n/a% in target.  After dinner: 54% in target.    Patient able to identify causes for the majority of her elevated blood sugars. Encouraged patient to choose sugar free beverages such as water, unsweetened iced tea, or diet soda when eating out.  Discussed how portions impact glycemic control. Also, discussed how fiber impacts glycemic control.  She would like to work on making changes to her intake.  She did become teary during discussion.  Provided affirmation and encouragement. Virtual follow up in 1 week.  She understands to reach out sooner if she has 3 or more elevated blood sugars.    Plan/Response:  No changes in the patient's current treatment plan.  Follow-up in 1 week.    Beckie Stephenson, MPH, RD, CDCES, LD 8/14/2023    Time Spent: 20 minutes    Any  diabetes medication dose changes were made via the CDE Protocol and Collaborative Practice Agreement with the patient's referring provider. A copy of this encounter was shared with the provider.

## 2023-08-14 NOTE — PROGRESS NOTES
Diabetes and Pregnancy Follow-up  Type of Service: Video Visit/ 20 minutes     Originating Location (Patient Location): Home  Distant Location (Provider Location): [facility] [place]  Mode of Communication:  Video     Video Visit Start Time: 12:34 PM  Video Visit End Time (telephone visit stop time): 12:54 PM     How would patient like to obtain AVS? not needed      Subjective/Objective:    Melissa Salinas was called for a scheduled BG review. Last date of communication was: 8/1/23.    Gestational diabetes is being managed with diet and activity    Taking diabetes medications: no    Estimated Date of Delivery: Oct 23, 2023    Blood Glucose/Ketone Log:    From 8/14/23: 92 (fasting), 96 (post breakfast)  (Testing 1 hour post meals)    Assessment:  Ketones: negative x1.   Fasting blood glucoses: 79% in target.  After breakfast: 100% in target.  Before lunch: n/a% in target.  After lunch: 85% in target.  Before dinner: n/a% in target.  After dinner: 54% in target.    Patient able to identify causes for the majority of her elevated blood sugars. Encouraged patient to choose sugar free beverages such as water, unsweetened iced tea, or diet soda when eating out.  Discussed how portions impact glycemic control. Also, discussed how fiber impacts glycemic control.  She would like to work on making changes to her intake.  She did become teary during discussion.  Provided affirmation and encouragement. Virtual follow up in 1 week.  She understands to reach out sooner if she has 3 or more elevated blood sugars.    Plan/Response:  No changes in the patient's current treatment plan.  Follow-up in 1 week.    Beckie Stephenson, MPH, RD, CDCES, LD 8/14/2023    Time Spent: 20 minutes    Any diabetes medication dose changes were made via the CDE Protocol and Collaborative Practice Agreement with the patient's referring provider. A copy of this encounter was shared with the provider.

## 2023-08-14 NOTE — TELEPHONE ENCOUNTER
Patient sent in glucoses for review.  Please see visit encounter dated 8/14/23 for assessment.   Beckie Stephenson, MPH, RD, CDCES, LD 8/14/2023

## 2023-08-14 NOTE — CONFIDENTIAL NOTE
Beckie,    Here are pt's BG records for appt today with you.    Mellisa Pisano RN, [facility]

## 2023-08-15 ENCOUNTER — MYC MEDICAL ADVICE (OUTPATIENT)
Dept: EDUCATION SERVICES | Facility: CLINIC | Age: 33
End: 2023-08-15
Payer: COMMERCIAL

## 2023-08-15 ENCOUNTER — TELEPHONE (OUTPATIENT)
Dept: FAMILY MEDICINE | Facility: CLINIC | Age: 33
End: 2023-08-15
Payer: COMMERCIAL

## 2023-08-15 DIAGNOSIS — O24.419 GESTATIONAL DIABETES MELLITUS (GDM) IN SECOND TRIMESTER, GESTATIONAL DIABETES METHOD OF CONTROL UNSPECIFIED: Primary | ICD-10-CM

## 2023-08-15 DIAGNOSIS — O24.414 INSULIN CONTROLLED GESTATIONAL DIABETES MELLITUS (GDM) IN THIRD TRIMESTER: Primary | ICD-10-CM

## 2023-08-15 RX ORDER — INSULIN LISPRO 100 [IU]/ML
INJECTION, SOLUTION INTRAVENOUS; SUBCUTANEOUS
Qty: 15 ML | Refills: 1 | Status: SHIPPED | OUTPATIENT
Start: 2023-08-15 | End: 2023-09-06

## 2023-08-15 NOTE — TELEPHONE ENCOUNTER
New Medication Request    Contacts         Type Contact Phone/Fax    08/15/2023 08:44 AM CDT Phone (Incoming) Melissa Salinas JOHN (Self) [phone] (M)     Medicatin request            What medication are you requesting?: Insulin     Reason for medication request: recommended by dietician    Have you taken this medication before?: No    Controlled Substance Agreement on file:   CSA -- Patient Level:    CSA: None found at the patient level.         Patient offered an appointment? No    Preferred Pharmacy:   [id number] - [place] [id number] [address] [address]  [facility] [address]  Phone: [phone] Fax: [phone]        Could we send this information to you in [employer] [facility] or would you prefer to receive a phone call?:   Patient would prefer a phone call   Okay to leave a detailed message?: Yes at Cell number on file:    Telephone Information:   Mobile [phone]

## 2023-08-15 NOTE — TELEPHONE ENCOUNTER
Spoke w/ pt. She states after her visit yesterday she is ready to start insulin. Reviewed injection technique, pt will ask the pharmacist for demonstration as well. Discussed timing of insulin and dose as well as storage. Discussed low BG s/s and treatment.   Pt to notify CDE in any low BG.     Per protocol will start with humalog 2 units before lunch as needed, if she feels the meal could elevate her, and 2 units before dinner.     Pt will follow up in pc as scheduled 8/23. To call sooner w/ any concerns.

## 2023-08-17 ENCOUNTER — ALLIED HEALTH/NURSE VISIT (OUTPATIENT)
Dept: ALLERGY | Facility: CLINIC | Age: 33
End: 2023-08-17
Payer: COMMERCIAL

## 2023-08-17 DIAGNOSIS — J30.81 ALLERGIC RHINITIS DUE TO ANIMALS: Primary | ICD-10-CM

## 2023-08-17 DIAGNOSIS — J30.1 SEASONAL ALLERGIC RHINITIS DUE TO POLLEN: ICD-10-CM

## 2023-08-17 DIAGNOSIS — J30.89 ALLERGIC RHINITIS CAUSED BY MOLD: ICD-10-CM

## 2023-08-17 PROCEDURE — 95117 IMMUNOTHERAPY INJECTIONS: CPT

## 2023-08-23 ENCOUNTER — OFFICE VISIT (OUTPATIENT)
Dept: ENDOCRINOLOGY | Facility: CLINIC | Age: 33
End: 2023-08-23
Payer: COMMERCIAL

## 2023-08-23 VITALS
HEART RATE: 82 BPM | OXYGEN SATURATION: 96 % | SYSTOLIC BLOOD PRESSURE: 106 MMHG | BODY MASS INDEX: 40.33 KG/M2 | WEIGHT: 247.2 LBS | DIASTOLIC BLOOD PRESSURE: 60 MMHG

## 2023-08-23 DIAGNOSIS — O24.414 INSULIN CONTROLLED GESTATIONAL DIABETES MELLITUS (GDM) IN THIRD TRIMESTER: Primary | ICD-10-CM

## 2023-08-23 DIAGNOSIS — O24.419 GESTATIONAL DIABETES MELLITUS (GDM) IN SECOND TRIMESTER, GESTATIONAL DIABETES METHOD OF CONTROL UNSPECIFIED: ICD-10-CM

## 2023-08-23 PROBLEM — F41.8 MIXED ANXIETY AND DEPRESSIVE DISORDER: Status: ACTIVE | Noted: 2023-08-23

## 2023-08-23 PROBLEM — J45.909 ASTHMA: Status: ACTIVE | Noted: 2023-08-23

## 2023-08-23 PROCEDURE — 99204 OFFICE O/P NEW MOD 45 MIN: CPT | Performed by: NURSE PRACTITIONER

## 2023-08-23 NOTE — PROGRESS NOTES
[facility]  ENDOCRINOLOGY    Gestational Diabetes 2023    Melissa Salinas, 1990, [id number]          Reason for visit      1. Insulin controlled gestational diabetes mellitus (GDM) in third trimester        HPI     Melissa Salinas is a very pleasant 32 year old old female who presents for GESTATIONAL Diabetes Mellitus.  She is currently 31w2d  weeks pregnant . Due date is 10/23/23   Diagnosed with GDM based on an OGTT. She hasnot had  GDM in prior pregnancies.   Current carbohydrate intake:consistent with recommendations of 30g-60g-60g.  I have reviewed her blood glucose logs and note that the:  Fasting readings  are:above range on current regimen  Postprandial readings are:in range on current regimen  Current NPH dose: 0  Current Prandial insulin: 2 units with Lunch and Dinner  Blood glucose logs/meter brought in and data reviewed and incorporated into decision-making.  Planned delivery at:   [facility]    Therapy/Interventions in the past:  She has been seen by the Diabetes Educator- and has received instruction on carbohydrate counting and  consistency.  Records from referring provider and other sources have also been reviewed and incorporated into decision-making.      TODAY:    Melissa is here today after starting insulin for GDM. She is joined by her . We are also joined by ASHTYN Gomez. Pt provides her log book today and she is starting to have FBS elevations. She is pretty consistent with her HS snack of a Keto Cereal, with which she has Chocolate milk. She has also had a couple of postprandial elevations, in spite of taking the 2 units of prandial insulin. We will have her take a higher dose with larger CHO meals.  Baby is moving appropriately and she is having no swelling at present.     Past Medical History       Patient Active Problem List   Diagnosis    Anxiety    Dysmenorrhea    Iron deficiency anemia    Migraine headache    Moderate persistent asthma without  complication    Vitamin D deficiency    Dermatitis    Mild major depression (H)    Class 3 severe obesity due to excess calories without serious comorbidity with body mass index (BMI) of 40.0 to 44.9 in adult (H)    Allergic rhinitis due to American house dust mite    Allergic rhinitis due to animals    Seasonal allergic rhinitis due to pollen    Elevated blood pressure reading without diagnosis of hypertension    Asthma    Gestational diabetes mellitus    Mixed anxiety and depressive disorder        Past Surgical History     Past Surgical History:   Procedure Laterality Date    SD LAP,APPENDECTOMY N/A 07/18/2020    Procedure: APPENDECTOMY, LAPAROSCOPIC;  Surgeon: Darion Trinidad MD;  Location: [facility];  Service: General    WISDOM TOOTH EXTRACTION         Family History     Family History   Problem Relation Age of Onset    Hypertension Mother     Asthma Mother     Asthma Father     Other Cancer Father     Hypertension Maternal Grandmother     Breast Cancer Maternal Grandmother     Osteoporosis Maternal Grandmother     Mental Illness Paternal Grandmother     Leukemia Paternal Grandfather     Alzheimer Disease Paternal Grandfather     Asthma Brother     Breast Cancer Maternal Aunt         in her 40s    Ovarian Cancer Maternal Aunt         in her 70s    Colorectal Cancer No family hx of     Coronary Artery Disease No family hx of     Coronary Artery Disease Early Onset No family hx of     Diabetes No family hx of        Social History     Social History     Tobacco Use    Smoking status: Never    Smokeless tobacco: Never   Vaping Use    Vaping Use: Never used   Substance Use Topics    Alcohol use: Not Currently     Comment: 0-2/wk    Drug use: Not Currently       Review of Systems     Patient has no polyuria or polydipsia, no chest pain, dyspnea or TIA's, no numbness, tingling or pain in extremities  Remainder negative except as noted in HPI.      Vital Signs     /60 (BP Location: Right arm, Patient  Position: Sitting, Cuff Size: Adult Large)   Pulse 82   Wt 112.1 kg (247 lb 3.2 oz)   LMP 2023   SpO2 96%   BMI 40.33 kg/m    Wt Readings from Last 3 Encounters:   23 112.1 kg (247 lb 3.2 oz)   23 112 kg (247 lb)   23 112 kg (247 lb)       Physical Exam     GENERAL: Pleasant, alert, appropriate appearance for age. No acute distress,   HEENT: Normocephalic, atraumatic  NECK: normal in appearance  CHEST/RESPIRATORY: Normal chest wall and respirations.   ABDOMEN: Gravid   SKIN: No melanosis,  ecchymosis,  vitiligo. No acanthosis nigricans  NEURO:  Non-focal, no tremor.  PSYCH: Alert and oriented -appropriate affect. Orientation, judgement and memory appear intact.  MSK: No joint abnormalities, FROM in all four extremities. No kyphosis    Assessment     1. Insulin controlled gestational diabetes mellitus (GDM) in third trimester        Plan     1. GESTATIONAL DIABETES-  Adjust dose as follows:    -NPH insulin 10   units. Increase by 2 units every 2 days to keep fasting blood glucose below 95mg/dL  -Novolog 0  units with breakfast  -Novolog 2 units with lunch   -Novolog 2 units with dinner  -Increase by 2 units every 2 days to keep 1 hour after meal blood glucose less than 140mg/dL    Take 6 units with larger CHO meals.     We reviewed glucose goals of fasting blood glucose <95 mg/dL and 1 hour post prandial blood glucose of <140 mg/dL.    Monitor blood sugar 4 times daily: Fasting  and 1 hour after each meal.  Contact  this clinic [phone] if blood glucose is not within the above-mentioned goals.     We discussed the importance of excellent glycemic control during pregnancy to limit complications such as fetal macrosomia, shoulder dystocia,  hypoglycemia and hyperbilirubinemia.  I have discussed the patient's increased risk of recurrent GDM and/or development of type 2 diabetes later in life.      Follow-up with Tonie in 1 week.       Gilda Watkins NP  2023      Lab  Results     No results found for: HGBA1C, CREATININE, MICROALBUR    Cholesterol   Date Value Ref Range Status   05/24/2021 161 <=199 mg/dL Final     Direct Measure HDL   Date Value Ref Range Status   05/24/2021 54 >=50 mg/dL Final     Triglycerides   Date Value Ref Range Status   05/24/2021 111 <=149 mg/dL Final       [unfilled]      Current Medications

## 2023-08-23 NOTE — Clinical Note
"    8/23/2023         RE: Melissa Salinas  [address] [facility] [address]        Dear Colleague,    Thank you for referring your patient, Melissa Salinas, to the [facility]. Please see a copy of my visit note below.        [facility]  ENDOCRINOLOGY    Gestational Diabetes 8/23/2023    Melissa Salinas, 1990, [id number]          Reason for visit      No diagnosis found.    HPI     Melissa Salinas is a very pleasant 32 year old old female who presents for GESTATIONAL Diabetes Mellitus.  She is currently***  weeks pregnant GP. Due date is*** 2015  Diagnosed with GDM based on an OGTT. She has{Blank single:16889::\"had\",\"not had\"}  GDM in prior pregnancies.   Current carbohydrate intake:consistent with recommendations of 30g-60g-60g.  I have reviewed her blood glucose logs and note that the:  Fasting readings  are:{Blank single:33542::\"in range\",\"above range\"} on current regimen  Postprandial readings are:{Blank single:29903::\"in range\",\"above range\"} on current regimen  Current NPH dose:  Current Prandial insulin:   Blood glucose logs/meter brought in and data reviewed and incorporated into decision-making.  [facility]     Therapy/Interventions in the past:  She has been seen by the Diabetes Educator- and has received instruction on carbohydrate counting and  consistency.  Records from referring provider and other sources have also been reviewed and incorporated into decision-making.      TODAY:    Past Medical History       Patient Active Problem List   Diagnosis    Anxiety    Dysmenorrhea    Iron deficiency anemia    Migraine headache    Moderate persistent asthma without complication    Vitamin D deficiency    Dermatitis    Mild major depression (H)    Class 3 severe obesity due to excess calories without serious comorbidity with body mass index (BMI) of 40.0 to 44.9 in adult (H)    Allergic rhinitis due to American house dust mite    Allergic rhinitis due to " animals    Seasonal allergic rhinitis due to pollen    Elevated blood pressure reading without diagnosis of hypertension    Asthma    Gestational diabetes mellitus    Mixed anxiety and depressive disorder        Past Surgical History     Past Surgical History:   Procedure Laterality Date    RI LAP,APPENDECTOMY N/A 07/18/2020    Procedure: APPENDECTOMY, LAPAROSCOPIC;  Surgeon: Darion Trinidad MD;  [place] [facility] [place]  Service: General    WISDOM TOOTH EXTRACTION         Family History     Family History   Problem Relation Age of Onset    Hypertension Mother     Asthma Mother     Asthma Father     Other Cancer Father     Hypertension Maternal Grandmother     Breast Cancer Maternal Grandmother     Osteoporosis Maternal Grandmother     Mental Illness Paternal Grandmother     Leukemia Paternal Grandfather     Alzheimer Disease Paternal Grandfather     Asthma Brother     Breast Cancer Maternal Aunt         in her 40s    Ovarian Cancer Maternal Aunt         in her 70s    Colorectal Cancer No family hx of     Coronary Artery Disease No family hx of     Coronary Artery Disease Early Onset No family hx of     Diabetes No family hx of        Social History     Social History     Tobacco Use    Smoking status: Never    Smokeless tobacco: Never   Vaping Use    Vaping Use: Never used   Substance Use Topics    Alcohol use: Not Currently     Comment: 0-2/wk    Drug use: Not Currently       Review of Systems     Patient has {:014176}  Remainder negative except as noted in HPI.      Vital Signs     /60 (BP Location: Right arm, Patient Position: Sitting, Cuff Size: Adult Large)   Pulse 82   Wt 112.1 kg (247 lb 3.2 oz)   LMP 01/06/2023   SpO2 96%   BMI 40.33 kg/m    Wt Readings from Last 3 Encounters:   08/23/23 112.1 kg (247 lb 3.2 oz)   04/27/23 112 kg (247 lb)   04/27/23 112 kg (247 lb)       Physical Exam     GENERAL: Pleasant, alert, appropriate appearance for age. No acute distress, ***  HEENT:  Normocephalic, atraumatic  NECK: Supple, no masses or  lymph nodes.  THYROID: No nodules or enlargement. Non-tender, no bruit  CHEST/RESPIRATORY: Normal chest wall and respirations. Clear to auscultation.  CARDIOVASCULAR: Regular rate and rhythm. S1, S2, no murmur, click, gallop, or rubs.  ABDOMEN: Gravid   LYMPHATIC: No palpable nodes in neck, supraclavicular,  SKIN: No melanosis,  ecchymosis,  vitiligo. No acanthosis nigricans  NEURO:  Non-focal, normal DTRs; no tremor.  PSYCH: Alert and oriented -appropriate affect. Orientation, judgement and memory appear intact.  MSK: No joint abnormalities, FROM in all four extremities. No kyphosis    Assessment     No diagnosis found.    Plan             Gilda Watkins NP  8/23/2023      This note has been dictated using voice recognition software.  Any grammatical or context distortions are unintentional and inherent to the software.     Lab Results     No results found for: HGBA1C, CREATININE, MICROALBUR    Cholesterol   Date Value Ref Range Status   05/24/2021 161 <=199 mg/dL Final     Direct Measure HDL   Date Value Ref Range Status   05/24/2021 54 >=50 mg/dL Final     Triglycerides   Date Value Ref Range Status   05/24/2021 111 <=149 mg/dL Final       [unfilled]      Current Medications                   Again, thank you for allowing me to participate in the care of your patient.        Sincerely,        Gilda Watkins NP

## 2023-08-25 DIAGNOSIS — O24.414 INSULIN CONTROLLED GESTATIONAL DIABETES MELLITUS (GDM) IN THIRD TRIMESTER: ICD-10-CM

## 2023-08-25 RX ORDER — INSULIN HUMAN 100 [IU]/ML
INJECTION, SUSPENSION SUBCUTANEOUS
Qty: 15 ML | Refills: 1 | Status: ON HOLD | OUTPATIENT
Start: 2023-08-25 | End: 2023-10-20

## 2023-08-25 RX ORDER — INSULIN HUMAN 100 [IU]/ML
INJECTION, SUSPENSION SUBCUTANEOUS
Qty: 15 ML | Refills: 1 | Status: SHIPPED | OUTPATIENT
Start: 2023-08-25 | End: 2023-08-25

## 2023-08-28 ENCOUNTER — HOSPITAL ENCOUNTER (OUTPATIENT)
Facility: HOSPITAL | Age: 33
Discharge: HOME | End: 2023-08-28
Attending: OBSTETRICS & GYNECOLOGY | Admitting: OBSTETRICS & GYNECOLOGY
Payer: COMMERCIAL

## 2023-08-28 ENCOUNTER — HOSPITAL ENCOUNTER (OUTPATIENT)
Facility: HOSPITAL | Age: 33
End: 2023-08-28
Admitting: OBSTETRICS & GYNECOLOGY
Payer: COMMERCIAL

## 2023-08-28 VITALS — RESPIRATION RATE: 16 BRPM | SYSTOLIC BLOOD PRESSURE: 124 MMHG | DIASTOLIC BLOOD PRESSURE: 76 MMHG | TEMPERATURE: 98.2 F

## 2023-08-28 PROCEDURE — G0463 HOSPITAL OUTPT CLINIC VISIT: HCPCS

## 2023-08-28 NOTE — PROVIDER NOTIFICATION
Updated Dr. Nation that patient in for decreased fetal movement. FHT moderate varability with one 15x15 acceleration and several 10x10 accerations (w/ some borderline 15x15) Dr. Nation okay to call strip reactive d/t patient gestation and to send patient home.     No other symptoms/concerns of note.     Patient discharged and given info on fetal kick counts.

## 2023-08-31 ENCOUNTER — VIRTUAL VISIT (OUTPATIENT)
Dept: EDUCATION SERVICES | Facility: CLINIC | Age: 33
End: 2023-08-31
Payer: COMMERCIAL

## 2023-08-31 DIAGNOSIS — O24.414 INSULIN CONTROLLED GESTATIONAL DIABETES MELLITUS (GDM) IN THIRD TRIMESTER: Primary | ICD-10-CM

## 2023-08-31 PROCEDURE — 98967 PH1 ASSMT&MGMT NQHP 11-20: CPT | Mod: 95

## 2023-08-31 NOTE — LETTER
8/31/2023         RE: Melissa Salinas  [address] [facility] [address]        Dear Colleague,    Thank you for referring your patient, Melissa Salinas, to the [facility]. Please see a copy of my visit note below.      Diabetes Self-Management and Care Support    Spoke with Melissa via telephone for her follow up on Gestational Diabetes.  She is currently taking insulin to help control her glucose.  She is feeling well.  Stated she is not having  swelling in her hands, not having swelling in her feet and her baby is moving well.  She is feeling good about her readings after starting the insulin at bedtime.  She had a rough day Monday as she went in the L+D for decreased fetal movement.  Baby looked great and she was released.  Stated her OB has no concerns and she will be seeing them again soon.  Discussed insulin dosing at meals is trial and error and we do not get it perfect all the time.  If reading is high there is nothing we can do now, move on with your day.    OB-[place]  EDC-10.23.23-32 weeks 3 days  Pregnancy number-1  OGTT-109/174/163/--  Previous GDM-No    Current insulin doses  Basal NPH 10 units  Prandial Novolog 0-2-2 6 units of higher carb meal    Current blood sugars  8.31-87/122  8.30-87/128/117/133  8.29-92/137/77/101  8.28-87/--/141/--  The 77 she had was a result of eating later than usual.  Stated she had a snack after she checked and felt better        Plan-  Continue with current doses.  Increase by 2 units every 2 times readings is high.  Call if there is any change in readings before next appointment.  Follow up scheduled for next week with endocrinology.       The service provided today was under the supervising provider, Gilda Watkins, who was available if needed.     Tonie Washburn RN, [facility]  [phone]  In Clinic Tuesday, Wednesday, Thursday  Telephone  DSMT 12 minutes

## 2023-08-31 NOTE — PROGRESS NOTES
Diabetes Self-Management and Care Support    Spoke with Melissa via telephone for her follow up on Gestational Diabetes.  She is currently taking insulin to help control her glucose.  She is feeling well.  Stated she is not having  swelling in her hands, not having swelling in her feet and her baby is moving well.  She is feeling good about her readings after starting the insulin at bedtime.  She had a rough day Monday as she went in the L+D for decreased fetal movement.  Baby looked great and she was released.  Stated her OB has no concerns and she will be seeing them again soon.  Discussed insulin dosing at meals is trial and error and we do not get it perfect all the time.  If reading is high there is nothing we can do now, move on with your day.    [facility]  EDC-10.23.23-32 weeks 3 days  Pregnancy number-1  OGTT-109/174/163/--  Previous GDM-No    Current insulin doses  Basal NPH 10 units  Prandial Novolog 0-2-2 6 units of higher carb meal    Current blood sugars  8.31-87/122  8.30-87/128/117/133  8.29-92/137/77/101  8.28-87/--/141/--  The 77 she had was a result of eating later than usual.  Stated she had a snack after she checked and felt better        Plan-  Continue with current doses.  Increase by 2 units every 2 times readings is high.  Call if there is any change in readings before next appointment.  Follow up scheduled for next week with endocrinology.       The service provided today was under the supervising provider, Gilda Watkins, who was available if needed.     Tonie Washburn RN, [facility]  [phone]  In Clinic Tuesday, Wednesday, Thursday  Telephone  DSMT 12 minutes

## 2023-09-06 ENCOUNTER — ALLIED HEALTH/NURSE VISIT (OUTPATIENT)
Dept: EDUCATION SERVICES | Facility: CLINIC | Age: 33
End: 2023-09-06
Payer: COMMERCIAL

## 2023-09-06 ENCOUNTER — OFFICE VISIT (OUTPATIENT)
Dept: ENDOCRINOLOGY | Facility: CLINIC | Age: 33
End: 2023-09-06
Payer: COMMERCIAL

## 2023-09-06 VITALS
WEIGHT: 247.5 LBS | HEART RATE: 85 BPM | DIASTOLIC BLOOD PRESSURE: 74 MMHG | OXYGEN SATURATION: 98 % | BODY MASS INDEX: 40.37 KG/M2 | SYSTOLIC BLOOD PRESSURE: 120 MMHG

## 2023-09-06 DIAGNOSIS — O24.414 INSULIN CONTROLLED GESTATIONAL DIABETES MELLITUS (GDM) IN THIRD TRIMESTER: Primary | ICD-10-CM

## 2023-09-06 PROCEDURE — 99213 OFFICE O/P EST LOW 20 MIN: CPT | Performed by: NURSE PRACTITIONER

## 2023-09-06 PROCEDURE — G0108 DIAB MANAGE TRN  PER INDIV: HCPCS

## 2023-09-06 RX ORDER — INSULIN LISPRO 100 [IU]/ML
INJECTION, SOLUTION INTRAVENOUS; SUBCUTANEOUS
Qty: 15 ML | Refills: 1 | Status: ON HOLD | OUTPATIENT
Start: 2023-09-06 | End: 2023-10-20

## 2023-09-06 NOTE — LETTER
"    2023         RE: Melissa Salinas  [address] [facility] [address]        Dear Colleague,    Thank you for referring your patient, Melissa Salinas, to the [facility]. Please see a copy of my visit note below.    [facility]  ENDOCRINOLOGY    Gestational Diabetes 2023    Melissa Salinas, 1990, [id number]          Reason for visit      1. Insulin controlled gestational diabetes mellitus (GDM) in third trimester        HPI   Melissa Salinas is a very pleasant 32 year old old female who presents for GESTATIONAL Diabetes Mellitus.  She is currently 33w2d  weeks pregnant . Due date is 10/23/23   Diagnosed with GDM based on an OGTT. She hasnot had  GDM in prior pregnancies.   Current carbohydrate intake:consistent with recommendations of 30g-60g-60g.  I have reviewed her blood glucose logs and note that the:  Fasting readings  are:above range on current regimen  Postprandial readings are:in range on current regimen  Current NPH dose: 10  Current Prandial insulin: 6 units with lunch and dinner  Blood glucose logs/meter brought in and data reviewed and incorporated into decision-making.  Planned delivery at:   OBGYN: [facility]       Therapy/Interventions in the past:  She has been seen by the Diabetes Educator- and has received instruction on carbohydrate counting and  consistency.  Records from referring provider and other sources have also been reviewed and incorporated into decision-making.      TODAY:    Melissa returns today in follow-up for GDM. We are joined by ASHTYN Gomez. Melissa provides her glucometer today and she is doing a bang up job of it. She had one postprandial elevation after \"breakfast for dinner\". She enjoyed her pancakes. She notes that she is taking 6 units (intended for larger meals), more often then taking 2. She needs her prescription adjusted. No current concerns from her OB. Baby is passing BPPs. She is having no swelling in her hands or " ankles.     Past Medical History       Patient Active Problem List   Diagnosis     Anxiety     Dysmenorrhea     Iron deficiency anemia     Migraine headache     Moderate persistent asthma without complication     Vitamin D deficiency     Dermatitis     Mild major depression (H)     Class 3 severe obesity due to excess calories without serious comorbidity with body mass index (BMI) of 40.0 to 44.9 in adult (H)     Allergic rhinitis due to [employer] house dust mite     Allergic rhinitis due to animals     Seasonal allergic rhinitis due to pollen     Elevated blood pressure reading without diagnosis of hypertension     Asthma     Gestational diabetes mellitus     Mixed anxiety and depressive disorder        Past Surgical History     Past Surgical History:   Procedure Laterality Date     AL LAP,APPENDECTOMY N/A 07/18/2020    Procedure: APPENDECTOMY, LAPAROSCOPIC;  Surgeon: Darion Trinidad MD;  [place] [facility] [place]  Service: General     WISDOM TOOTH EXTRACTION         Family History     Family History   Problem Relation Age of Onset     Hypertension Mother      Asthma Mother      Asthma Father      Other Cancer Father      Hypertension Maternal Grandmother      Breast Cancer Maternal Grandmother      Osteoporosis Maternal Grandmother      Mental Illness Paternal Grandmother      Leukemia Paternal Grandfather      Alzheimer Disease Paternal Grandfather      Asthma Brother      Breast Cancer Maternal Aunt         in her 40s     Ovarian Cancer Maternal Aunt         in her 70s     Colorectal Cancer No family hx of      Coronary Artery Disease No family hx of      Coronary Artery Disease Early Onset No family hx of      Diabetes No family hx of        Social History     Social History     Tobacco Use     Smoking status: Never     Smokeless tobacco: Never   Vaping Use     Vaping Use: Never used   Substance Use Topics     Alcohol use: Not Currently     Comment: 0-2/wk     Drug use: Not Currently       Review of  Systems     Patient has no polyuria or polydipsia, no chest pain, dyspnea or TIA's, no numbness, tingling or pain in extremities  Remainder negative except as noted in HPI.    Vital Signs     /74 (BP Location: Right arm, Patient Position: Sitting, Cuff Size: Adult Regular)   Pulse 85   Wt 112.3 kg (247 lb 8 oz)   LMP 2023   SpO2 98%   BMI 40.37 kg/m    Wt Readings from Last 3 Encounters:   23 112.3 kg (247 lb 8 oz)   23 112.1 kg (247 lb 3.2 oz)   23 112 kg (247 lb)       Physical Exam     GENERAL: Pleasant, alert, appropriate appearance for age. No acute distress,   HEENT: Normocephalic, atraumatic  NECK: normal in appearance  CHEST/RESPIRATORY: Normal chest wall and respirations.   ABDOMEN: Gravid   SKIN: No melanosis,  ecchymosis,  vitiligo. No acanthosis nigricans  NEURO:  Non-focal, no tremor.  PSYCH: Alert and oriented -appropriate affect. Orientation, judgement and memory appear intact.  MSK: No joint abnormalities, FROM in all four extremities. No kyphosis    Assessment     1. Insulin controlled gestational diabetes mellitus (GDM) in third trimester        Plan     1. GESTATIONAL DIABETES-  Adjust dose as follows:     -NPH insulin 10   units. Increase by 2 units every 2 days to keep fasting blood glucose below 95mg/dL  -Novolog 0  units with breakfast  -Novolog 6 units with lunch   -Novolog 6 units with dinner  -Increase by 2 units every 2 days to keep 1 hour after meal blood glucose less than 140mg/dL     We reviewed glucose goals of fasting blood glucose <95 mg/dL and 1 hour post prandial blood glucose of <140 mg/dL.    Monitor blood sugar 4 times daily: Fasting  and 1 hour after each meal.  Contact  this clinic [phone] if blood glucose is not within the above-mentioned goals.      We discussed the importance of excellent glycemic control during pregnancy to limit complications such as fetal macrosomia, shoulder dystocia,  hypoglycemia and hyperbilirubinemia.   I have discussed the patient's increased risk of recurrent GDM and/or development of type 2 diabetes later in life.       Follow-up in 2 weeks            Gilda Watkins NP  9/6/2023      Lab Results     No results found for: HGBA1C, CREATININE, MICROALBUR    Cholesterol   Date Value Ref Range Status   05/24/2021 161 <=199 mg/dL Final     Direct Measure HDL   Date Value Ref Range Status   05/24/2021 54 >=50 mg/dL Final     Triglycerides   Date Value Ref Range Status   05/24/2021 111 <=149 mg/dL Final             Current Medications                   Again, thank you for allowing me to participate in the care of your patient.        Sincerely,        Gilda Watkins NP

## 2023-09-06 NOTE — LETTER
9/6/2023         RE: Melissa Salinas  [address] [facility] [address]        Dear Colleague,    Thank you for referring your patient, Melissa Salinas, to the [facility]. Please see a copy of my visit note below.    Diabetes Self-Management and Care Support    Melissa is here alone today for her follow up on Gestational Diabetes.  She is currently taking insulin to help control her glucose.  She is feeling good.  Stated she is not having swelling in her hands, not having swelling in her feet and her baby is moving well.  Melissa is seeing her OB weekly at this point.  State she has no concerns.  She has been adjusting her insulin as needed.    OB-[facility]  EDC-10.23.23-33 weeks 2 days  Pregnancy number-1  OGTT-109/174/163  Previous GDM-No    Current insulin doses  Basal NPH 10 units  Prandial Novolog 2-6 depending on meal content    Current blood sugars              Plan-  Continue with current doses.  Incresae by 2 units every two times reading is above goal.  Call if there is any change in readings before next appointment.  Follow up scheduled for 2 weeks with endocrinology.       The service provided today was under the supervising provider, Gilda Watkins, who was available if needed.     Tonie Washburn RN, [facility]  [phone]  In Clinic Tuesday, Wednesday, Thursday  In person   DSMT 30 minutes

## 2023-09-06 NOTE — PROGRESS NOTES
"[facility]  ENDOCRINOLOGY    Gestational Diabetes 2023    Melissa Salinas, 1990, [id number]          Reason for visit      1. Insulin controlled gestational diabetes mellitus (GDM) in third trimester        HPI   Melissa Salinas is a very pleasant 32 year old old female who presents for GESTATIONAL Diabetes Mellitus.  She is currently 33w2d  weeks pregnant . Due date is 10/23/23   Diagnosed with GDM based on an OGTT. She hasnot had  GDM in prior pregnancies.   Current carbohydrate intake:consistent with recommendations of 30g-60g-60g.  I have reviewed her blood glucose logs and note that the:  Fasting readings  are:above range on current regimen  Postprandial readings are:in range on current regimen  Current NPH dose: 10  Current Prandial insulin: 6 units with lunch and dinner  Blood glucose logs/meter brought in and data reviewed and incorporated into decision-making.  Planned delivery at:   OBGYN: [facility]       Therapy/Interventions in the past:  She has been seen by the Diabetes Educator- and has received instruction on carbohydrate counting and  consistency.  Records from referring provider and other sources have also been reviewed and incorporated into decision-making.      TODAY:    Melissa returns today in follow-up for GDM. We are joined by ASHTYN Gomez. Melissa provides her glucometer today and she is doing a bang up job of it. She had one postprandial elevation after \"breakfast for dinner\". She enjoyed her pancakes. She notes that she is taking 6 units (intended for larger meals), more often then taking 2. She needs her prescription adjusted. No current concerns from her OB. Baby is passing BPPs. She is having no swelling in her hands or ankles.     Past Medical History       Patient Active Problem List   Diagnosis    Anxiety    Dysmenorrhea    Iron deficiency anemia    Migraine headache    Moderate persistent asthma without complication    Vitamin D deficiency    Dermatitis    Mild major " depression (H)    Class 3 severe obesity due to excess calories without serious comorbidity with body mass index (BMI) of 40.0 to 44.9 in adult (H)    Allergic rhinitis due to [employer] house dust mite    Allergic rhinitis due to animals    Seasonal allergic rhinitis due to pollen    Elevated blood pressure reading without diagnosis of hypertension    Asthma    Gestational diabetes mellitus    Mixed anxiety and depressive disorder        Past Surgical History     Past Surgical History:   Procedure Laterality Date    [place] LAP,APPENDECTOMY N/A 07/18/2020    Procedure: APPENDECTOMY, LAPAROSCOPIC;  Surgeon: Darion Trinidad MD;  Location: [facility] OR;  Service: General    WISDOM TOOTH EXTRACTION         Family History     Family History   Problem Relation Age of Onset    Hypertension Mother     Asthma Mother     Asthma Father     Other Cancer Father     Hypertension Maternal Grandmother     Breast Cancer Maternal Grandmother     Osteoporosis Maternal Grandmother     Mental Illness Paternal Grandmother     Leukemia Paternal Grandfather     Alzheimer Disease Paternal Grandfather     Asthma Brother     Breast Cancer Maternal Aunt         in her 40s    Ovarian Cancer Maternal Aunt         in her 70s    Colorectal Cancer No family hx of     Coronary Artery Disease No family hx of     Coronary Artery Disease Early Onset No family hx of     Diabetes No family hx of        Social History     Social History     Tobacco Use    Smoking status: Never    Smokeless tobacco: Never   Vaping Use    Vaping Use: Never used   Substance Use Topics    Alcohol use: Not Currently     Comment: 0-2/wk    Drug use: Not Currently       Review of Systems     Patient has no polyuria or polydipsia, no chest pain, dyspnea or TIA's, no numbness, tingling or pain in extremities  Remainder negative except as noted in HPI.    Vital Signs     /74 (BP Location: Right arm, Patient Position: Sitting, Cuff Size: Adult Regular)   Pulse 85   Wt  112.3 kg (247 lb 8 oz)   LMP 2023   SpO2 98%   BMI 40.37 kg/m    Wt Readings from Last 3 Encounters:   23 112.3 kg (247 lb 8 oz)   23 112.1 kg (247 lb 3.2 oz)   23 112 kg (247 lb)       Physical Exam     GENERAL: Pleasant, alert, appropriate appearance for age. No acute distress,   HEENT: Normocephalic, atraumatic  NECK: normal in appearance  CHEST/RESPIRATORY: Normal chest wall and respirations.   ABDOMEN: Gravid   SKIN: No melanosis,  ecchymosis,  vitiligo. No acanthosis nigricans  NEURO:  Non-focal, no tremor.  PSYCH: Alert and oriented -appropriate affect. Orientation, judgement and memory appear intact.  MSK: No joint abnormalities, FROM in all four extremities. No kyphosis    Assessment     1. Insulin controlled gestational diabetes mellitus (GDM) in third trimester        Plan     1. GESTATIONAL DIABETES-  Adjust dose as follows:     -NPH insulin 10   units. Increase by 2 units every 2 days to keep fasting blood glucose below 95mg/dL  -Novolog 0  units with breakfast  -Novolog 6 units with lunch   -Novolog 6 units with dinner  -Increase by 2 units every 2 days to keep 1 hour after meal blood glucose less than 140mg/dL     We reviewed glucose goals of fasting blood glucose <95 mg/dL and 1 hour post prandial blood glucose of <140 mg/dL.    Monitor blood sugar 4 times daily: Fasting  and 1 hour after each meal.  Contact  this clinic [phone] if blood glucose is not within the above-mentioned goals.      We discussed the importance of excellent glycemic control during pregnancy to limit complications such as fetal macrosomia, shoulder dystocia,  hypoglycemia and hyperbilirubinemia.  I have discussed the patient's increased risk of recurrent GDM and/or development of type 2 diabetes later in life.       Follow-up in 2 weeks            Gilda Watkins NP  2023      Lab Results     No results found for: HGBA1C, CREATININE, MICROALBUR    Cholesterol   Date Value Ref Range  Status   05/24/2021 161 <=199 mg/dL Final     Direct Measure HDL   Date Value Ref Range Status   05/24/2021 54 >=50 mg/dL Final     Triglycerides   Date Value Ref Range Status   05/24/2021 111 <=149 mg/dL Final             Current Medications

## 2023-09-11 DIAGNOSIS — J45.40 MODERATE PERSISTENT ASTHMA WITHOUT COMPLICATION: ICD-10-CM

## 2023-09-11 DIAGNOSIS — J30.2 SEASONAL ALLERGIES: ICD-10-CM

## 2023-09-11 RX ORDER — MONTELUKAST SODIUM 10 MG/1
10 TABLET ORAL DAILY
Qty: 90 TABLET | Refills: 0 | Status: SHIPPED | OUTPATIENT
Start: 2023-09-11 | End: 2023-12-07

## 2023-09-11 NOTE — TELEPHONE ENCOUNTER
"Routing refill request to provider for review/approval because:  Patient needs to be seen because it has been more than 6 months since last office visit.    Last Written Prescription Date:  9/20/22  Last Fill Quantity: 90,  # refills: 3   Last office visit provider:  2/16/23     Requested Prescriptions   Pending Prescriptions Disp Refills    montelukast (SINGULAIR) 10 MG tablet [Pharmacy Med Name: MONTELUKAST 10MG TABLETS] 90 tablet 3     Sig: TAKE 1 TABLET(10 MG) BY MOUTH DAILY       Leukotriene Inhibitors Protocol Failed - 9/11/2023  3:33 AM        Failed - Recent (6 mo) or future (30 days) visit within the authorizing provider's specialty     Patient had office visit in the last 6 months or has a visit in the next 30 days with authorizing provider or within the authorizing provider's specialty.  See \"Patient Info\" tab in inbasket, or \"Choose Columns\" in Meds & Orders section of the refill encounter.            Passed - Patient is age 12 or older     If patient is under 16, ok to refill using age based dosing.           Passed - Asthma control assessment score within normal limits in last 6 months     Please review ACT score.           Passed - Medication is active on med list             Gris Ozuna RN 09/11/23 4:01 AM  "

## 2023-09-20 ENCOUNTER — OFFICE VISIT (OUTPATIENT)
Dept: ENDOCRINOLOGY | Facility: CLINIC | Age: 33
End: 2023-09-20
Payer: COMMERCIAL

## 2023-09-20 ENCOUNTER — APPOINTMENT (OUTPATIENT)
Dept: EDUCATION SERVICES | Facility: CLINIC | Age: 33
End: 2023-09-20
Payer: COMMERCIAL

## 2023-09-20 VITALS
DIASTOLIC BLOOD PRESSURE: 84 MMHG | HEART RATE: 81 BPM | BODY MASS INDEX: 41.06 KG/M2 | OXYGEN SATURATION: 97 % | SYSTOLIC BLOOD PRESSURE: 138 MMHG | WEIGHT: 251.7 LBS

## 2023-09-20 DIAGNOSIS — O24.414 INSULIN CONTROLLED GESTATIONAL DIABETES MELLITUS (GDM) IN THIRD TRIMESTER: Primary | ICD-10-CM

## 2023-09-20 PROCEDURE — 99213 OFFICE O/P EST LOW 20 MIN: CPT | Performed by: NURSE PRACTITIONER

## 2023-09-20 NOTE — LETTER
2023         RE: Melissa Salinas  [address] [facility] [address]        Dear Colleague,    Thank you for referring your patient, Melissa Salinas, to the [facility]. Please see a copy of my visit note below.    [facility]  ENDOCRINOLOGY    Gestational Diabetes 2023    Melissa Salinas, 1990, [id number]          Reason for visit      1. Insulin controlled gestational diabetes mellitus (GDM) in third trimester        HPI     Melissa Salinas is a very pleasant 32 year old old female who presents for GESTATIONAL Diabetes Mellitus.  She is currently 33w2d  weeks pregnant . Due date is 10/23/23   Diagnosed with GDM based on an OGTT. She hasnot had  GDM in prior pregnancies.   Current carbohydrate intake:consistent with recommendations of 30g-60g-60g.  I have reviewed her blood glucose logs and note that the:  Fasting readings  are:above range on current regimen  Postprandial readings are:in range on current regimen  Current NPH dose: 10  Current Prandial insulin: 6 units with lunch and dinner  Blood glucose logs/meter brought in and data reviewed and incorporated into decision-making.  Planned delivery at:   OBN: [facility]       Therapy/Interventions in the past:  She has been seen by the Diabetes Educator- and has received instruction on carbohydrate counting and  consistency.  Records from referring provider and other sources have also been reviewed and incorporated into decision-making.      TODAY:    Melissa returns today in follow-up for management of GDM. We are joined by ASHTYN Gomez. Pt supplies her glucometer today and numbers look good. She is watching how her BG go and if running lower for the day, she will not take any with dinner. She will have a growth US done next week. Baby is passing BPPs and therefore moving. She is having no swelling.     Blood Glucose Numbers:      Fasting  81  1hour after:  B  115  L  125  D  114      Fasting  78   Pt will  sick this day  1 hour after:  B  132  L  113  D  95      Fasting  86  1 hour after:  B  103  L  108  D  118      Fasting  81  1 hour after:  B  101  L  130  D  116      Fasting  83  1 hour after:  B  115  L  99  D  148      Fasting  87  1 hour after:  B  125  L  122  D  137      [id number]  1 hour after:  B  118  L  102  D                             Past Medical History       Patient Active Problem List   Diagnosis     Anxiety     Dysmenorrhea     Iron deficiency anemia     Migraine headache     Moderate persistent asthma without complication     Vitamin D deficiency     Dermatitis     Mild major depression (H)     Class 3 severe obesity due to excess calories without serious comorbidity with body mass index (BMI) of 40.0 to 44.9 in adult (H)     Allergic rhinitis due to [employer] house dust mite     Allergic rhinitis due to animals     Seasonal allergic rhinitis due to pollen     Elevated blood pressure reading without diagnosis of hypertension     Asthma     Gestational diabetes mellitus     Mixed anxiety and depressive disorder        Past Surgical History     Past Surgical History:   Procedure Laterality Date     [place] LAP,APPENDECTOMY N/A 07/18/2020    Procedure: APPENDECTOMY, LAPAROSCOPIC;  Surgeon: Darion Trinidad MD;  Location: [facility];  Service: General     WISDOM TOOTH EXTRACTION         Family History     Family History   Problem Relation Age of Onset     Hypertension Mother      Asthma Mother      Asthma Father      Other Cancer Father      Hypertension Maternal Grandmother      Breast Cancer Maternal Grandmother      Osteoporosis Maternal Grandmother      Mental Illness Paternal Grandmother      Leukemia Paternal Grandfather      Alzheimer Disease Paternal Grandfather      Asthma Brother      Breast Cancer Maternal Aunt         in her 40s     Ovarian Cancer Maternal Aunt         in her 70s     Colorectal Cancer No family hx of      Coronary Artery Disease No family hx of      Coronary Artery  Disease Early Onset No family hx of      Diabetes No family hx of        Social History     Social History     Tobacco Use     Smoking status: Never     Smokeless tobacco: Never   Vaping Use     Vaping Use: Never used   Substance Use Topics     Alcohol use: Not Currently     Comment: 0-2/wk     Drug use: Not Currently       Review of Systems     Patient has no polyuria or polydipsia, no chest pain, dyspnea or TIA's, no numbness, tingling or pain in extremities  Remainder negative except as noted in HPI.    Vital Signs     /84 (BP Location: Right arm, Patient Position: Sitting, Cuff Size: Adult Regular)   Pulse 81   Wt 114.2 kg (251 lb 11.2 oz)   LMP 01/06/2023   SpO2 97%   BMI 41.06 kg/m    Wt Readings from Last 3 Encounters:   09/20/23 114.2 kg (251 lb 11.2 oz)   09/06/23 112.3 kg (247 lb 8 oz)   08/23/23 112.1 kg (247 lb 3.2 oz)       Physical Exam     GENERAL: Pleasant, alert, appropriate appearance for age. No acute distress,   HEENT: Normocephalic, atraumatic  NECK: normal in appearance  CHEST/RESPIRATORY: Normal chest wall and respirations.   ABDOMEN: Gravid   SKIN: No melanosis,  ecchymosis,  vitiligo. No acanthosis nigricans  NEURO:  Non-focal, no tremor.  PSYCH: Alert and oriented -appropriate affect. Orientation, judgement and memory appear intact.  MSK: No joint abnormalities, FROM in all four extremities. No kyphosis    Assessment     1. Insulin controlled gestational diabetes mellitus (GDM) in third trimester        Plan        1. GESTATIONAL DIABETES-  Adjust dose as follows:     -NPH insulin 10   units. Increase by 2 units every 2 days to keep fasting blood glucose below 95mg/dL  -Novolog 0  units with breakfast  -Novolog 6 units with lunch   -Novolog 6 units with dinner  -Increase by 2 units every 2 days to keep 1 hour after meal blood glucose less than 140mg/dL     We reviewed glucose goals of fasting blood glucose <95 mg/dL and 1 hour post prandial blood glucose of <140 mg/dL.    Monitor  blood sugar 4 times daily: Fasting  and 1 hour after each meal.  Contact  this clinic [phone] if blood glucose is not within the above-mentioned goals.      We discussed the importance of excellent glycemic control during pregnancy to limit complications such as fetal macrosomia, shoulder dystocia,  hypoglycemia and hyperbilirubinemia.  I have discussed the patient's increased risk of recurrent GDM and/or development of type 2 diabetes later in life.       Follow-up in 2 weeks         Gilda Watkins NP  2023      Lab Results     No results found for: HGBA1C, CREATININE, MICROALBUR    Cholesterol   Date Value Ref Range Status   2021 161 <=199 mg/dL Final     Direct Measure HDL   Date Value Ref Range Status   2021 54 >=50 mg/dL Final     Triglycerides   Date Value Ref Range Status   2021 111 <=149 mg/dL Final         Current Medications             Again, thank you for allowing me to participate in the care of your patient.        Sincerely,        Gilda Watkins NP

## 2023-09-20 NOTE — PROGRESS NOTES
[facility]  ENDOCRINOLOGY    Gestational Diabetes 2023    Melissa Salinas, 1990, [id number]          Reason for visit      1. Insulin controlled gestational diabetes mellitus (GDM) in third trimester        HPI     Melissa Salinas is a very pleasant 32 year old old female who presents for GESTATIONAL Diabetes Mellitus.  She is currently 35w2d  weeks pregnant . Due date is 10/23/23   Diagnosed with GDM based on an OGTT. She hasnot had  GDM in prior pregnancies.   Current carbohydrate intake:consistent with recommendations of 30g-60g-60g.  I have reviewed her blood glucose logs and note that the:  Fasting readings  are:above range on current regimen  Postprandial readings are:in range on current regimen  Current NPH dose: 10  Current Prandial insulin: 6 units with lunch and dinner  Blood glucose logs/meter brought in and data reviewed and incorporated into decision-making.  [facility]       Therapy/Interventions in the past:  She has been seen by the Diabetes Educator- and has received instruction on carbohydrate counting and  consistency.  Records from referring provider and other sources have also been reviewed and incorporated into decision-making.      TODAY:    Melissa returns today in follow-up for management of GDM. We are joined by ASHTYN Gomez. Pt supplies her glucometer today and numbers look good. She is watching how her BG go and if running lower for the day, she will not take any with dinner. She will have a growth US done next week. Baby is passing BPPs and therefore moving. She is having no swelling.     Blood Glucose Numbers:      Fasting  81  1hour after:  B  115  L  125  D  114      Fasting  78   Pt will sick this day  1 hour after:  B  132  L  113  D  95      Fasting  86  1 hour after:  B  103  L  108  D  118      Fasting  81  1 hour after:  B  101  L  130  D  116      Fasting  83  1 hour after:  B  115  L  99  D  148      Fasting  87  1 hour after:  B   125  L  122  D  137      [id number]  1 hour after:  B  118  L  102  D                             Past Medical History       Patient Active Problem List   Diagnosis    Anxiety    Dysmenorrhea    Iron deficiency anemia    Migraine headache    Moderate persistent asthma without complication    Vitamin D deficiency    Dermatitis    Mild major depression (H)    Class 3 severe obesity due to excess calories without serious comorbidity with body mass index (BMI) of 40.0 to 44.9 in adult (H)    Allergic rhinitis due to [employer] house dust mite    Allergic rhinitis due to animals    Seasonal allergic rhinitis due to pollen    Elevated blood pressure reading without diagnosis of hypertension    Asthma    Gestational diabetes mellitus    Mixed anxiety and depressive disorder        Past Surgical History     Past Surgical History:   Procedure Laterality Date    NH LAP,APPENDECTOMY N/A 07/18/2020    Procedure: APPENDECTOMY, LAPAROSCOPIC;  Surgeon: Darion Trinidad MD;  Location: [facility];  Service: General    WISDOM TOOTH EXTRACTION         Family History     Family History   Problem Relation Age of Onset    Hypertension Mother     Asthma Mother     Asthma Father     Other Cancer Father     Hypertension Maternal Grandmother     Breast Cancer Maternal Grandmother     Osteoporosis Maternal Grandmother     Mental Illness Paternal Grandmother     Leukemia Paternal Grandfather     Alzheimer Disease Paternal Grandfather     Asthma Brother     Breast Cancer Maternal Aunt         in her 40s    Ovarian Cancer Maternal Aunt         in her 70s    Colorectal Cancer No family hx of     Coronary Artery Disease No family hx of     Coronary Artery Disease Early Onset No family hx of     Diabetes No family hx of        Social History     Social History     Tobacco Use    Smoking status: Never    Smokeless tobacco: Never   Vaping Use    Vaping Use: Never used   Substance Use Topics    Alcohol use: Not Currently     Comment: 0-2/wk     Drug use: Not Currently       Review of Systems     Patient has no polyuria or polydipsia, no chest pain, dyspnea or TIA's, no numbness, tingling or pain in extremities  Remainder negative except as noted in HPI.    Vital Signs     /84 (BP Location: Right arm, Patient Position: Sitting, Cuff Size: Adult Regular)   Pulse 81   Wt 114.2 kg (251 lb 11.2 oz)   LMP 01/06/2023   SpO2 97%   BMI 41.06 kg/m    Wt Readings from Last 3 Encounters:   09/20/23 114.2 kg (251 lb 11.2 oz)   09/06/23 112.3 kg (247 lb 8 oz)   08/23/23 112.1 kg (247 lb 3.2 oz)       Physical Exam     GENERAL: Pleasant, alert, appropriate appearance for age. No acute distress,   HEENT: Normocephalic, atraumatic  NECK: normal in appearance  CHEST/RESPIRATORY: Normal chest wall and respirations.   ABDOMEN: Gravid   SKIN: No melanosis,  ecchymosis,  vitiligo. No acanthosis nigricans  NEURO:  Non-focal, no tremor.  PSYCH: Alert and oriented -appropriate affect. Orientation, judgement and memory appear intact.  MSK: No joint abnormalities, FROM in all four extremities. No kyphosis    Assessment     1. Insulin controlled gestational diabetes mellitus (GDM) in third trimester        Plan        1. GESTATIONAL DIABETES-  Adjust dose as follows:     -NPH insulin 10   units. Increase by 2 units every 2 days to keep fasting blood glucose below 95mg/dL  -Novolog 0  units with breakfast  -Novolog 6 units with lunch   -Novolog 6 units with dinner  -Increase by 2 units every 2 days to keep 1 hour after meal blood glucose less than 140mg/dL     We reviewed glucose goals of fasting blood glucose <95 mg/dL and 1 hour post prandial blood glucose of <140 mg/dL.    Monitor blood sugar 4 times daily: Fasting  and 1 hour after each meal.  Contact  this clinic [phone] if blood glucose is not within the above-mentioned goals.      We discussed the importance of excellent glycemic control during pregnancy to limit complications such as fetal macrosomia, shoulder  dystocia,  hypoglycemia and hyperbilirubinemia.  I have discussed the patient's increased risk of recurrent GDM and/or development of type 2 diabetes later in life.       Follow-up in 2 weeks         Gilda Watkins NP  2023      Lab Results     No results found for: HGBA1C, CREATININE, MICROALBUR    Cholesterol   Date Value Ref Range Status   2021 161 <=199 mg/dL Final     Direct Measure HDL   Date Value Ref Range Status   2021 54 >=50 mg/dL Final     Triglycerides   Date Value Ref Range Status   2021 111 <=149 mg/dL Final         Current Medications

## 2023-09-21 ENCOUNTER — OFFICE VISIT (OUTPATIENT)
Dept: ALLERGY | Facility: CLINIC | Age: 33
End: 2023-09-21
Payer: COMMERCIAL

## 2023-09-21 DIAGNOSIS — J30.89 ALLERGIC RHINITIS CAUSED BY MOLD: Primary | ICD-10-CM

## 2023-09-21 DIAGNOSIS — J30.81 ALLERGIC RHINITIS DUE TO ANIMALS: ICD-10-CM

## 2023-09-21 PROCEDURE — 95117 IMMUNOTHERAPY INJECTIONS: CPT

## 2023-09-26 NOTE — PROGRESS NOTES
Diabetes Self-Management and Care Support    Melissa is here alone today for her follow up on Gestational Diabetes.  She is currently taking insulin to help control her glucose.  She is feeling good.  Stated she is not having swelling in her hands, not having swelling in her feet and her baby is moving well.  Melissa is seeing her OB weekly at this point.  State she has no concerns.  She has been adjusting her insulin as needed.    [facility]  EDC-10.23.23-33 weeks 2 days  Pregnancy number-1  OGTT-109/174/163  Previous GDM-No    Current insulin doses  Basal NPH 10 units  Prandial Novolog 2-6 depending on meal content    Current blood sugars              Plan-  Continue with current doses.  Incresae by 2 units every two times reading is above goal.  Call if there is any change in readings before next appointment.  Follow up scheduled for 2 weeks with endocrinology.       The service provided today was under the supervising provider, Gilda Watkins, who was available if needed.     Tonie Washburn RN, [facility]  [phone]  In Clinic Tuesday, Wednesday, Thursday  In person   DSMT 30 minutes

## 2023-09-27 ENCOUNTER — LAB REQUISITION (OUTPATIENT)
Dept: LAB | Facility: CLINIC | Age: 33
End: 2023-09-27
Payer: COMMERCIAL

## 2023-09-27 DIAGNOSIS — Z34.01 ENCOUNTER FOR SUPERVISION OF NORMAL FIRST PREGNANCY, FIRST TRIMESTER: ICD-10-CM

## 2023-09-27 PROCEDURE — 87653 STREP B DNA AMP PROBE: CPT | Mod: ORL | Performed by: OBSTETRICS & GYNECOLOGY

## 2023-09-28 LAB — GP B STREP DNA SPEC QL NAA+PROBE: NEGATIVE

## 2023-10-04 ENCOUNTER — ALLIED HEALTH/NURSE VISIT (OUTPATIENT)
Dept: EDUCATION SERVICES | Facility: CLINIC | Age: 33
End: 2023-10-04
Payer: COMMERCIAL

## 2023-10-04 ENCOUNTER — OFFICE VISIT (OUTPATIENT)
Dept: ENDOCRINOLOGY | Facility: CLINIC | Age: 33
End: 2023-10-04
Payer: COMMERCIAL

## 2023-10-04 VITALS
DIASTOLIC BLOOD PRESSURE: 80 MMHG | BODY MASS INDEX: 40.95 KG/M2 | HEART RATE: 80 BPM | WEIGHT: 251 LBS | SYSTOLIC BLOOD PRESSURE: 108 MMHG | OXYGEN SATURATION: 98 %

## 2023-10-04 DIAGNOSIS — O24.414 INSULIN CONTROLLED GESTATIONAL DIABETES MELLITUS (GDM) IN THIRD TRIMESTER: Primary | ICD-10-CM

## 2023-10-04 PROCEDURE — G0108 DIAB MANAGE TRN  PER INDIV: HCPCS

## 2023-10-04 PROCEDURE — 99214 OFFICE O/P EST MOD 30 MIN: CPT | Performed by: NURSE PRACTITIONER

## 2023-10-04 NOTE — LETTER
10/4/2023         RE: Melissa Salinas  [address] [address]        Dear Colleague,    Thank you for referring your patient, Melissa Salinas, to the [facility]. Please see a copy of my visit note below.    [facility]  ENDOCRINOLOGY    Gestational Diabetes 10/5/2023    Melissa Salinas, 1990, [id number]          Reason for visit      1. Insulin controlled gestational diabetes mellitus (GDM) in third trimester        HPI     Melissa Salinas is a very pleasant 32 year old old female who presents for GESTATIONAL Diabetes Mellitus.  She is currently 37w2d  weeks pregnant . Due date is 10/23/23   Diagnosed with GDM based on an OGTT. She hasnot had  GDM in prior pregnancies.   Current carbohydrate intake:consistent with recommendations of 30g-60g-60g.  I have reviewed her blood glucose logs and note that the:  Fasting readings  are:above range on current regimen  Postprandial readings are:in range on current regimen  Current NPH dose: 10  Current Prandial insulin: 6 units with lunch and dinner  Blood glucose logs/meter brought in and data reviewed and incorporated into decision-making.  Planned delivery at:   [facility]     Therapy/Interventions in the past:  She has been seen by the Diabetes Educator- and has received instruction on carbohydrate counting and  consistency.  Records from referring provider and other sources have also been reviewed and incorporated into decision-making.      TODAY:    Melissa is seen today for her last appointment prior to delivery for GDM. We are joined by ASHTYN Gomez. She is scheduled for a  on 10/18. She provides BG today and continues to do a great job with management. She did have a couple of FBS in the 70s this last week, but denies any symptoms of hypoglycemia. Baby is moving appropriately and she is having no swelling in her hands or feet. Discussed postpartum instructions today and all questions answered to her  satisfaction. She will call prior to Delivery with any concerns or problems.     Past Medical History       Patient Active Problem List   Diagnosis     Anxiety     Dysmenorrhea     Iron deficiency anemia     Migraine headache     Moderate persistent asthma without complication     Vitamin D deficiency     Dermatitis     Mild major depression (H)     Class 3 severe obesity due to excess calories without serious comorbidity with body mass index (BMI) of 40.0 to 44.9 in adult (H)     Allergic rhinitis due to [employer] house dust mite     Allergic rhinitis due to animals     Seasonal allergic rhinitis due to pollen     Elevated blood pressure reading without diagnosis of hypertension     Asthma     Gestational diabetes mellitus     Mixed anxiety and depressive disorder        Past Surgical History     Past Surgical History:   Procedure Laterality Date     MA LAP,APPENDECTOMY N/A 07/18/2020    Procedure: APPENDECTOMY, LAPAROSCOPIC;  Surgeon: Darion Trinidad MD;  Location: [facility];  Service: General     WISDOM TOOTH EXTRACTION         Family History     Family History   Problem Relation Age of Onset     Hypertension Mother      Asthma Mother      Asthma Father      Other Cancer Father      Hypertension Maternal Grandmother      Breast Cancer Maternal Grandmother      Osteoporosis Maternal Grandmother      Mental Illness Paternal Grandmother      Leukemia Paternal Grandfather      Alzheimer Disease Paternal Grandfather      Asthma Brother      Breast Cancer Maternal Aunt         in her 40s     Ovarian Cancer Maternal Aunt         in her 70s     Colorectal Cancer No family hx of      Coronary Artery Disease No family hx of      Coronary Artery Disease Early Onset No family hx of      Diabetes No family hx of        Social History     Social History     Tobacco Use     Smoking status: Never     Smokeless tobacco: Never   Vaping Use     Vaping Use: Never used   Substance Use Topics     Alcohol use: Not Currently      Comment: 0-2/wk     Drug use: Not Currently       Review of Systems     Patient has no polyuria or polydipsia, no chest pain, dyspnea or TIA's, no numbness, tingling or pain in extremities  Remainder negative except as noted in HPI.      Vital Signs     /80 (BP Location: Right arm, Patient Position: Sitting, Cuff Size: Adult Regular)   Pulse 80   Wt 113.9 kg (251 lb)   LMP 01/06/2023   SpO2 98%   BMI 40.95 kg/m    Wt Readings from Last 3 Encounters:   10/04/23 113.9 kg (251 lb)   09/20/23 114.2 kg (251 lb 11.2 oz)   09/06/23 112.3 kg (247 lb 8 oz)       Physical Exam     GENERAL: Pleasant, alert, appropriate appearance for age. No acute distress,   HEENT: Normocephalic, atraumatic  NECK: normal in appearance  CHEST/RESPIRATORY: Normal chest wall and respirations.   ABDOMEN: Gravid   SKIN: No melanosis,  ecchymosis,  vitiligo. No acanthosis nigricans  NEURO:  Non-focal, no tremor.  PSYCH: Alert and oriented -appropriate affect. Orientation, judgement and memory appear intact.  MSK: No joint abnormalities, FROM in all four extremities. No kyphosis    Assessment     1. Insulin controlled gestational diabetes mellitus (GDM) in third trimester        Plan     1. GESTATIONAL DIABETES-  Adjust dose as follows:     -NPH insulin 10   units. Increase by 2 units every 2 days to keep fasting blood glucose below 95mg/dL  -Novolog 0  units with breakfast  -Novolog 6 units with lunch   -Novolog 6 units with dinner  -Increase by 2 units every 2 days to keep 1 hour after meal blood glucose less than 140mg/dL     We reviewed glucose goals of fasting blood glucose <95 mg/dL and 1 hour post prandial blood glucose of <140 mg/dL.    Monitor blood sugar 4 times daily: Fasting  and 1 hour after each meal.  Contact  this clinic [phone] if blood glucose is not within the above-mentioned goals.      We discussed the importance of excellent glycemic control during pregnancy to limit complications such as fetal macrosomia,  shoulder dystocia,  hypoglycemia and hyperbilirubinemia.  I have discussed the patient's increased risk of recurrent GDM and/or development of type 2 diabetes later in life.       F/up with A1c 3 months postpartum with PCP.  May be a candidate for metformin postpartum as she has more than 1 risk factor for future Type 2 Diabetes Mellitus.  She will need a screening A1c at least annually, TLC- including carbohydrate control and exercise.    After you deliver the baby, it is suggested to check your blood sugar occasionally (1 - 2 times per week) for 6 weeks.   When you are not pregnant, normal blood sugars are:     Fasting (before eating anything in the morning)  - under 100 mg/dl  2 hours after meals under 140  The American Diabetes Association recommends:   a glucose tolerance test 6 weeks after you deliver the baby.       a hemoglobin Alc test yearly after delivery.  The Alc test is a 2-3 month average blood sugar reading.        Discuss getting these tests with your provider.     After delivery, and your provider has said that it is safe to be active, work toward getting 150 minutes each week of physical activity to decrease your risk of  getting type 2 diabetes.     Maintaining a healthy body weight will also decrease your risk of getting type 2 diabetes.           Gilda Watkins NP  10/5/2023        Lab Results     No results found for: HGBA1C, CREATININE, MICROALBUR    Cholesterol   Date Value Ref Range Status   2021 161 <=199 mg/dL Final     Direct Measure HDL   Date Value Ref Range Status   2021 54 >=50 mg/dL Final     Triglycerides   Date Value Ref Range Status   2021 111 <=149 mg/dL Final       [unfilled]      Current Medications                     Again, thank you for allowing me to participate in the care of your patient.        Sincerely,        Gilda Watkins NP

## 2023-10-04 NOTE — LETTER
"    10/4/2023         RE: Melissa Salinas  [address] [address]        Dear Colleague,    Thank you for referring your patient, Melissa Salinas, to the [facility]. Please see a copy of my visit note below.    Diabetes Self-Management and Care Support    Melissa is here alone today for her follow up on Gestational Diabetes.  She is currently taking insulin to help control her glucose.  She is feeling well.  Stated she is not having swelling in her hands, not having swelling in her feet and her baby is moving well.  Stated she had a \"terrible\" night last night.  She over ate at dinner and was uncomfortable during the night.  She has trouble with heartburn and had to sit up.  She is shaving a scheduled  on 10.18.23.  Stated they might push this back if her baby is doing well and not too big.    [facility]  EDC-10.23.23-37 weeks 2 days  Pregnancy number-1  OGTT-109/174/163/--  Previous GDM-No    Current insulin doses  Basal NPH 10 units  Prandial Novolog 6 units more often    Current blood sugars            Plan-  Continue insulin doses as they are.  Call if there is any change in readings before next appointment.  Follow up scheduled for PRN  Today will be patient's last visit.  Discussed continuing to check glucose 4 times a day and following meal plan until the day of delivery.    After you deliver the baby, it is suggested to check your blood sugar occasionally (1 - 2 times per week) for 6 weeks.   When you are not pregnant, normal blood sugars are:     Fasting (before eating anything in the morning)  - under 100 mg/dl  2 hours after meals under 140  The American Diabetes Association recommends:   a glucose tolerance test 6 weeks after you deliver the baby.      a hemoglobin Alc test yearly after delivery.  The Alc test is a 2-3 month average blood sugar reading.       Discuss getting these tests with your provider.    After delivery, and your provider has said " that it is safe to be active, work toward getting 150 minutes each week of physical activity to decrease your risk of  getting type 2 diabetes.    Maintaining a healthy body weight will also decrease your risk of getting type 2 diabetes.          The service provided today was under the supervising provider, Gilda Watkins, who was available if needed.     Tonie Washburn RN, [facility]  [phone]  In Clinic Tuesday, Wednesday, Thursday  In person   DSMT 30 minutes

## 2023-10-04 NOTE — PROGRESS NOTES
[facility]  ENDOCRINOLOGY    Gestational Diabetes 10/5/2023    Melissa Salinas, 1990, [id number]          Reason for visit      1. Insulin controlled gestational diabetes mellitus (GDM) in third trimester        HPI     Melissa Salinas is a very pleasant 32 year old old female who presents for GESTATIONAL Diabetes Mellitus.  She is currently 37w2d  weeks pregnant . Due date is 10/23/23   Diagnosed with GDM based on an OGTT. She hasnot had  GDM in prior pregnancies.   Current carbohydrate intake:consistent with recommendations of 30g-60g-60g.  I have reviewed her blood glucose logs and note that the:  Fasting readings  are:above range on current regimen  Postprandial readings are:in range on current regimen  Current NPH dose: 10  Current Prandial insulin: 6 units with lunch and dinner  Blood glucose logs/meter brought in and data reviewed and incorporated into decision-making.  [facility]     Therapy/Interventions in the past:  She has been seen by the Diabetes Educator- and has received instruction on carbohydrate counting and  consistency.  Records from referring provider and other sources have also been reviewed and incorporated into decision-making.      TODAY:    Melissa is seen today for her last appointment prior to delivery for GDM. We are joined by ASHTYN Gomez. She is scheduled for a  on 10/18. She provides BG today and continues to do a great job with management. She did have a couple of FBS in the 70s this last week, but denies any symptoms of hypoglycemia. Baby is moving appropriately and she is having no swelling in her hands or feet. Discussed postpartum instructions today and all questions answered to her satisfaction. She will call prior to Delivery with any concerns or problems.     Past Medical History       Patient Active Problem List   Diagnosis    Anxiety    Dysmenorrhea    Iron deficiency anemia    Migraine headache    Moderate persistent asthma  without complication    Vitamin D deficiency    Dermatitis    Mild major depression (H)    Class 3 severe obesity due to excess calories without serious comorbidity with body mass index (BMI) of 40.0 to 44.9 in adult (H)    Allergic rhinitis due to [employer] house dust mite    Allergic rhinitis due to animals    Seasonal allergic rhinitis due to pollen    Elevated blood pressure reading without diagnosis of hypertension    Asthma    Gestational diabetes mellitus    Mixed anxiety and depressive disorder        Past Surgical History     Past Surgical History:   Procedure Laterality Date    [place] LAP,APPENDECTOMY N/A 07/18/2020    Procedure: APPENDECTOMY, LAPAROSCOPIC;  Surgeon: Darion Trinidad MD;  Location: [facility];  Service: General    WISDOM TOOTH EXTRACTION         Family History     Family History   Problem Relation Age of Onset    Hypertension Mother     Asthma Mother     Asthma Father     Other Cancer Father     Hypertension Maternal Grandmother     Breast Cancer Maternal Grandmother     Osteoporosis Maternal Grandmother     Mental Illness Paternal Grandmother     Leukemia Paternal Grandfather     Alzheimer Disease Paternal Grandfather     Asthma Brother     Breast Cancer Maternal Aunt         in her 40s    Ovarian Cancer Maternal Aunt         in her 70s    Colorectal Cancer No family hx of     Coronary Artery Disease No family hx of     Coronary Artery Disease Early Onset No family hx of     Diabetes No family hx of        Social History     Social History     Tobacco Use    Smoking status: Never    Smokeless tobacco: Never   Vaping Use    Vaping Use: Never used   Substance Use Topics    Alcohol use: Not Currently     Comment: 0-2/wk    Drug use: Not Currently       Review of Systems     Patient has no polyuria or polydipsia, no chest pain, dyspnea or TIA's, no numbness, tingling or pain in extremities  Remainder negative except as noted in HPI.      Vital Signs     /80 (BP Location: Right arm,  Patient Position: Sitting, Cuff Size: Adult Regular)   Pulse 80   Wt 113.9 kg (251 lb)   LMP 2023   SpO2 98%   BMI 40.95 kg/m    Wt Readings from Last 3 Encounters:   10/04/23 113.9 kg (251 lb)   23 114.2 kg (251 lb 11.2 oz)   23 112.3 kg (247 lb 8 oz)       Physical Exam     GENERAL: Pleasant, alert, appropriate appearance for age. No acute distress,   HEENT: Normocephalic, atraumatic  NECK: normal in appearance  CHEST/RESPIRATORY: Normal chest wall and respirations.   ABDOMEN: Gravid   SKIN: No melanosis,  ecchymosis,  vitiligo. No acanthosis nigricans  NEURO:  Non-focal, no tremor.  PSYCH: Alert and oriented -appropriate affect. Orientation, judgement and memory appear intact.  MSK: No joint abnormalities, FROM in all four extremities. No kyphosis    Assessment     1. Insulin controlled gestational diabetes mellitus (GDM) in third trimester        Plan     1. GESTATIONAL DIABETES-  Adjust dose as follows:     -NPH insulin 10   units. Increase by 2 units every 2 days to keep fasting blood glucose below 95mg/dL  -Novolog 0  units with breakfast  -Novolog 6 units with lunch   -Novolog 6 units with dinner  -Increase by 2 units every 2 days to keep 1 hour after meal blood glucose less than 140mg/dL     We reviewed glucose goals of fasting blood glucose <95 mg/dL and 1 hour post prandial blood glucose of <140 mg/dL.    Monitor blood sugar 4 times daily: Fasting  and 1 hour after each meal.  Contact  this clinic [phone] if blood glucose is not within the above-mentioned goals.      We discussed the importance of excellent glycemic control during pregnancy to limit complications such as fetal macrosomia, shoulder dystocia,  hypoglycemia and hyperbilirubinemia.  I have discussed the patient's increased risk of recurrent GDM and/or development of type 2 diabetes later in life.       F/up with A1c 3 months postpartum with PCP.  May be a candidate for metformin postpartum as she has more  than 1 risk factor for future Type 2 Diabetes Mellitus.  She will need a screening A1c at least annually, TLC- including carbohydrate control and exercise.    After you deliver the baby, it is suggested to check your blood sugar occasionally (1 - 2 times per week) for 6 weeks.   When you are not pregnant, normal blood sugars are:     Fasting (before eating anything in the morning)  - under 100 mg/dl  2 hours after meals under 140  The American Diabetes Association recommends:   a glucose tolerance test 6 weeks after you deliver the baby.       a hemoglobin Alc test yearly after delivery.  The Alc test is a 2-3 month average blood sugar reading.        Discuss getting these tests with your provider.     After delivery, and your provider has said that it is safe to be active, work toward getting 150 minutes each week of physical activity to decrease your risk of  getting type 2 diabetes.     Maintaining a healthy body weight will also decrease your risk of getting type 2 diabetes.           Gilda Watkins NP  10/5/2023        Lab Results     No results found for: HGBA1C, CREATININE, MICROALBUR    Cholesterol   Date Value Ref Range Status   05/24/2021 161 <=199 mg/dL Final     Direct Measure HDL   Date Value Ref Range Status   05/24/2021 54 >=50 mg/dL Final     Triglycerides   Date Value Ref Range Status   05/24/2021 111 <=149 mg/dL Final       [unfilled]      Current Medications

## 2023-10-10 NOTE — PROGRESS NOTES
"Diabetes Self-Management and Care Support    Melissa is here alone today for her follow up on Gestational Diabetes.  She is currently taking insulin to help control her glucose.  She is feeling well.  Stated she is not having swelling in her hands, not having swelling in her feet and her baby is moving well.  Stated she had a \"terrible\" night last night.  She over ate at dinner and was uncomfortable during the night.  She has trouble with heartburn and had to sit up.  She is shaving a scheduled  on 10.18.23.  Stated they might push this back if her baby is doing well and not too big.    [facility]  EDC-10.23.23-37 weeks 2 days  Pregnancy number-1  OGTT-109/174/163/--  Previous GDM-No    Current insulin doses  Basal NPH 10 units  Prandial Novolog 6 units more often    Current blood sugars            Plan-  Continue insulin doses as they are.  Call if there is any change in readings before next appointment.  Follow up scheduled for PRN  Today will be patient's last visit.  Discussed continuing to check glucose 4 times a day and following meal plan until the day of delivery.    After you deliver the baby, it is suggested to check your blood sugar occasionally (1 - 2 times per week) for 6 weeks.   When you are not pregnant, normal blood sugars are:     Fasting (before eating anything in the morning)  - under 100 mg/dl  2 hours after meals under 140  The American Diabetes Association recommends:   a glucose tolerance test 6 weeks after you deliver the baby.      a hemoglobin Alc test yearly after delivery.  The Alc test is a 2-3 month average blood sugar reading.       Discuss getting these tests with your provider.    After delivery, and your provider has said that it is safe to be active, work toward getting 150 minutes each week of physical activity to decrease your risk of  getting type 2 diabetes.    Maintaining a healthy body weight will also decrease your risk of getting type 2 diabetes.          The " service provided today was under the supervising provider, Gilda Watkins, who was available if needed.     Tonie Washburn RN, [facility]  [phone]  In Clinic Tuesday, Wednesday, Thursday  In person   DSMT 30 minutes

## 2023-10-12 ENCOUNTER — ALLIED HEALTH/NURSE VISIT (OUTPATIENT)
Dept: ALLERGY | Facility: CLINIC | Age: 33
End: 2023-10-12
Payer: COMMERCIAL

## 2023-10-12 DIAGNOSIS — J30.89 ALLERGIC RHINITIS CAUSED BY MOLD: Primary | ICD-10-CM

## 2023-10-12 DIAGNOSIS — J30.81 ALLERGIC RHINITIS DUE TO ANIMALS: ICD-10-CM

## 2023-10-12 DIAGNOSIS — J30.1 SEASONAL ALLERGIC RHINITIS DUE TO POLLEN: ICD-10-CM

## 2023-10-12 DIAGNOSIS — J30.89 ALLERGIC RHINITIS DUE TO AMERICAN HOUSE DUST MITE: ICD-10-CM

## 2023-10-12 PROCEDURE — 95117 IMMUNOTHERAPY INJECTIONS: CPT

## 2023-10-16 ENCOUNTER — MEDICAL CORRESPONDENCE (OUTPATIENT)
Dept: OBGYN | Facility: HOSPITAL | Age: 33
End: 2023-10-16
Payer: COMMERCIAL

## 2023-10-18 ENCOUNTER — ANESTHESIA EVENT (OUTPATIENT)
Dept: OBGYN | Facility: CLINIC | Age: 33
End: 2023-10-18
Payer: COMMERCIAL

## 2023-10-18 ENCOUNTER — HOSPITAL ENCOUNTER (INPATIENT)
Facility: CLINIC | Age: 33
LOS: 2 days | Discharge: HOME HEALTH | End: 2023-10-20
Attending: OBSTETRICS & GYNECOLOGY | Admitting: OBSTETRICS & GYNECOLOGY
Payer: COMMERCIAL

## 2023-10-18 ENCOUNTER — ANESTHESIA (OUTPATIENT)
Dept: OBGYN | Facility: CLINIC | Age: 33
End: 2023-10-18
Payer: COMMERCIAL

## 2023-10-18 DIAGNOSIS — D62 ANEMIA DUE TO BLOOD LOSS, ACUTE: ICD-10-CM

## 2023-10-18 LAB
ABO/RH(D): ABNORMAL
ANTIBODY ID: NORMAL
ANTIBODY SCREEN: POSITIVE
GLUCOSE BLDC GLUCOMTR-MCNC: 120 MG/DL (ref 70–99)
GLUCOSE BLDC GLUCOMTR-MCNC: 65 MG/DL (ref 70–99)
HGB BLD-MCNC: 12.7 G/DL (ref 11.7–15.7)
SPECIMEN EXPIRATION DATE: ABNORMAL
SPECIMEN EXPIRATION DATE: NORMAL
T PALLIDUM AB SER QL: NONREACTIVE

## 2023-10-18 PROCEDURE — 258N000003 HC RX IP 258 OP 636: Performed by: NURSE ANESTHETIST, CERTIFIED REGISTERED

## 2023-10-18 PROCEDURE — 120N000001 HC R&B MED SURG/OB

## 2023-10-18 PROCEDURE — 250N000011 HC RX IP 250 OP 636: Mod: JZ

## 2023-10-18 PROCEDURE — 36415 COLL VENOUS BLD VENIPUNCTURE: CPT | Performed by: PHYSICIAN ASSISTANT

## 2023-10-18 PROCEDURE — 250N000009 HC RX 250

## 2023-10-18 PROCEDURE — 86780 TREPONEMA PALLIDUM: CPT | Performed by: PHYSICIAN ASSISTANT

## 2023-10-18 PROCEDURE — 86870 RBC ANTIBODY IDENTIFICATION: CPT | Performed by: PHYSICIAN ASSISTANT

## 2023-10-18 PROCEDURE — 250N000011 HC RX IP 250 OP 636: Mod: JZ | Performed by: PHYSICIAN ASSISTANT

## 2023-10-18 PROCEDURE — C9290 INJ, BUPIVACAINE LIPOSOME: HCPCS | Performed by: ANESTHESIOLOGY

## 2023-10-18 PROCEDURE — 258N000003 HC RX IP 258 OP 636: Performed by: ANESTHESIOLOGY

## 2023-10-18 PROCEDURE — 85018 HEMOGLOBIN: CPT | Performed by: PHYSICIAN ASSISTANT

## 2023-10-18 PROCEDURE — 86901 BLOOD TYPING SEROLOGIC RH(D): CPT | Performed by: PHYSICIAN ASSISTANT

## 2023-10-18 PROCEDURE — 250N000013 HC RX MED GY IP 250 OP 250 PS 637: Performed by: PHYSICIAN ASSISTANT

## 2023-10-18 PROCEDURE — 86922 COMPATIBILITY TEST ANTIGLOB: CPT | Performed by: OBSTETRICS & GYNECOLOGY

## 2023-10-18 PROCEDURE — 250N000011 HC RX IP 250 OP 636: Mod: JZ | Performed by: NURSE ANESTHETIST, CERTIFIED REGISTERED

## 2023-10-18 PROCEDURE — 360N000076 HC SURGERY LEVEL 3, PER MIN: Performed by: OBSTETRICS & GYNECOLOGY

## 2023-10-18 PROCEDURE — 82565 ASSAY OF CREATININE: CPT | Performed by: OBSTETRICS & GYNECOLOGY

## 2023-10-18 PROCEDURE — 250N000011 HC RX IP 250 OP 636: Performed by: ANESTHESIOLOGY

## 2023-10-18 PROCEDURE — 258N000003 HC RX IP 258 OP 636: Performed by: OBSTETRICS & GYNECOLOGY

## 2023-10-18 PROCEDURE — 272N000001 HC OR GENERAL SUPPLY STERILE: Performed by: OBSTETRICS & GYNECOLOGY

## 2023-10-18 PROCEDURE — 250N000011 HC RX IP 250 OP 636: Mod: JZ | Performed by: OBSTETRICS & GYNECOLOGY

## 2023-10-18 PROCEDURE — 999N000249 HC STATISTIC C-SECTION ON UNIT: Performed by: OBSTETRICS & GYNECOLOGY

## 2023-10-18 PROCEDURE — 250N000013 HC RX MED GY IP 250 OP 250 PS 637: Performed by: OBSTETRICS & GYNECOLOGY

## 2023-10-18 PROCEDURE — 370N000017 HC ANESTHESIA TECHNICAL FEE, PER MIN: Performed by: OBSTETRICS & GYNECOLOGY

## 2023-10-18 RX ORDER — CEFAZOLIN SODIUM 2 G/100ML
2 INJECTION, SOLUTION INTRAVENOUS
Status: COMPLETED | OUTPATIENT
Start: 2023-10-18 | End: 2023-10-18

## 2023-10-18 RX ORDER — FENTANYL CITRATE 50 UG/ML
50 INJECTION, SOLUTION INTRAMUSCULAR; INTRAVENOUS EVERY 5 MIN PRN
Status: DISCONTINUED | OUTPATIENT
Start: 2023-10-18 | End: 2023-10-18

## 2023-10-18 RX ORDER — ONDANSETRON 4 MG/1
4 TABLET, ORALLY DISINTEGRATING ORAL EVERY 6 HOURS PRN
Status: DISCONTINUED | OUTPATIENT
Start: 2023-10-18 | End: 2023-10-20 | Stop reason: HOSPADM

## 2023-10-18 RX ORDER — DEXTROSE, SODIUM CHLORIDE, SODIUM LACTATE, POTASSIUM CHLORIDE, AND CALCIUM CHLORIDE 5; .6; .31; .03; .02 G/100ML; G/100ML; G/100ML; G/100ML; G/100ML
INJECTION, SOLUTION INTRAVENOUS CONTINUOUS
Status: DISCONTINUED | OUTPATIENT
Start: 2023-10-18 | End: 2023-10-20 | Stop reason: HOSPADM

## 2023-10-18 RX ORDER — ACETAMINOPHEN 325 MG/1
975 TABLET ORAL EVERY 6 HOURS
Status: DISCONTINUED | OUTPATIENT
Start: 2023-10-18 | End: 2023-10-20 | Stop reason: HOSPADM

## 2023-10-18 RX ORDER — DEXTROSE MONOHYDRATE 25 G/50ML
25-50 INJECTION, SOLUTION INTRAVENOUS
Status: DISCONTINUED | OUTPATIENT
Start: 2023-10-18 | End: 2023-10-20 | Stop reason: HOSPADM

## 2023-10-18 RX ORDER — FENTANYL CITRATE-0.9 % NACL/PF 10 MCG/ML
100 PLASTIC BAG, INJECTION (ML) INTRAVENOUS EVERY 5 MIN PRN
Status: CANCELLED | OUTPATIENT
Start: 2023-10-18

## 2023-10-18 RX ORDER — MISOPROSTOL 200 UG/1
800 TABLET ORAL
Status: DISCONTINUED | OUTPATIENT
Start: 2023-10-18 | End: 2023-10-20 | Stop reason: HOSPADM

## 2023-10-18 RX ORDER — METOCLOPRAMIDE HYDROCHLORIDE 5 MG/ML
10 INJECTION INTRAMUSCULAR; INTRAVENOUS EVERY 6 HOURS PRN
Status: DISCONTINUED | OUTPATIENT
Start: 2023-10-18 | End: 2023-10-20 | Stop reason: HOSPADM

## 2023-10-18 RX ORDER — LIDOCAINE 40 MG/G
CREAM TOPICAL
Status: DISCONTINUED | OUTPATIENT
Start: 2023-10-18 | End: 2023-10-20 | Stop reason: HOSPADM

## 2023-10-18 RX ORDER — METHYLERGONOVINE MALEATE 0.2 MG/ML
200 INJECTION INTRAVENOUS
Status: DISCONTINUED | OUTPATIENT
Start: 2023-10-18 | End: 2023-10-18 | Stop reason: HOSPADM

## 2023-10-18 RX ORDER — CARBOPROST TROMETHAMINE 250 UG/ML
250 INJECTION, SOLUTION INTRAMUSCULAR
Status: DISCONTINUED | OUTPATIENT
Start: 2023-10-18 | End: 2023-10-18 | Stop reason: HOSPADM

## 2023-10-18 RX ORDER — METHYLERGONOVINE MALEATE 0.2 MG/ML
200 INJECTION INTRAVENOUS
Status: DISCONTINUED | OUTPATIENT
Start: 2023-10-18 | End: 2023-10-20 | Stop reason: HOSPADM

## 2023-10-18 RX ORDER — CITRIC ACID/SODIUM CITRATE 334-500MG
30 SOLUTION, ORAL ORAL
Status: COMPLETED | OUTPATIENT
Start: 2023-10-18 | End: 2023-10-18

## 2023-10-18 RX ORDER — LIDOCAINE 40 MG/G
CREAM TOPICAL
Status: DISCONTINUED | OUTPATIENT
Start: 2023-10-18 | End: 2023-10-18 | Stop reason: HOSPADM

## 2023-10-18 RX ORDER — NALOXONE HYDROCHLORIDE 0.4 MG/ML
0.4 INJECTION, SOLUTION INTRAMUSCULAR; INTRAVENOUS; SUBCUTANEOUS
Status: DISCONTINUED | OUTPATIENT
Start: 2023-10-18 | End: 2023-10-20 | Stop reason: HOSPADM

## 2023-10-18 RX ORDER — CEFAZOLIN SODIUM 2 G/100ML
2 INJECTION, SOLUTION INTRAVENOUS SEE ADMIN INSTRUCTIONS
Status: DISCONTINUED | OUTPATIENT
Start: 2023-10-18 | End: 2023-10-18 | Stop reason: HOSPADM

## 2023-10-18 RX ORDER — OXYTOCIN/0.9 % SODIUM CHLORIDE 30/500 ML
100-340 PLASTIC BAG, INJECTION (ML) INTRAVENOUS CONTINUOUS PRN
Status: DISCONTINUED | OUTPATIENT
Start: 2023-10-18 | End: 2023-10-20 | Stop reason: HOSPADM

## 2023-10-18 RX ORDER — SODIUM CHLORIDE, SODIUM LACTATE, POTASSIUM CHLORIDE, CALCIUM CHLORIDE 600; 310; 30; 20 MG/100ML; MG/100ML; MG/100ML; MG/100ML
INJECTION, SOLUTION INTRAVENOUS CONTINUOUS
Status: DISCONTINUED | OUTPATIENT
Start: 2023-10-18 | End: 2023-10-20 | Stop reason: HOSPADM

## 2023-10-18 RX ORDER — MISOPROSTOL 200 UG/1
400 TABLET ORAL
Status: DISCONTINUED | OUTPATIENT
Start: 2023-10-18 | End: 2023-10-18 | Stop reason: HOSPADM

## 2023-10-18 RX ORDER — NALOXONE HYDROCHLORIDE 0.4 MG/ML
0.2 INJECTION, SOLUTION INTRAMUSCULAR; INTRAVENOUS; SUBCUTANEOUS
Status: DISCONTINUED | OUTPATIENT
Start: 2023-10-18 | End: 2023-10-20 | Stop reason: HOSPADM

## 2023-10-18 RX ORDER — SODIUM CHLORIDE, SODIUM LACTATE, POTASSIUM CHLORIDE, CALCIUM CHLORIDE 600; 310; 30; 20 MG/100ML; MG/100ML; MG/100ML; MG/100ML
INJECTION, SOLUTION INTRAVENOUS CONTINUOUS
Status: DISCONTINUED | OUTPATIENT
Start: 2023-10-18 | End: 2023-10-18 | Stop reason: HOSPADM

## 2023-10-18 RX ORDER — ONDANSETRON 4 MG/1
4 TABLET, ORALLY DISINTEGRATING ORAL EVERY 30 MIN PRN
Status: DISCONTINUED | OUTPATIENT
Start: 2023-10-18 | End: 2023-10-18

## 2023-10-18 RX ORDER — FENTANYL CITRATE 50 UG/ML
25 INJECTION, SOLUTION INTRAMUSCULAR; INTRAVENOUS EVERY 5 MIN PRN
Status: DISCONTINUED | OUTPATIENT
Start: 2023-10-18 | End: 2023-10-18

## 2023-10-18 RX ORDER — DEXAMETHASONE SODIUM PHOSPHATE 4 MG/ML
INJECTION, SOLUTION INTRA-ARTICULAR; INTRALESIONAL; INTRAMUSCULAR; INTRAVENOUS; SOFT TISSUE PRN
Status: DISCONTINUED | OUTPATIENT
Start: 2023-10-18 | End: 2023-10-18

## 2023-10-18 RX ORDER — OXYTOCIN/0.9 % SODIUM CHLORIDE 30/500 ML
340 PLASTIC BAG, INJECTION (ML) INTRAVENOUS CONTINUOUS PRN
Status: DISCONTINUED | OUTPATIENT
Start: 2023-10-18 | End: 2023-10-20 | Stop reason: HOSPADM

## 2023-10-18 RX ORDER — AMOXICILLIN 250 MG
1 CAPSULE ORAL 2 TIMES DAILY
Status: DISCONTINUED | OUTPATIENT
Start: 2023-10-18 | End: 2023-10-20 | Stop reason: HOSPADM

## 2023-10-18 RX ORDER — OXYTOCIN/0.9 % SODIUM CHLORIDE 30/500 ML
340 PLASTIC BAG, INJECTION (ML) INTRAVENOUS CONTINUOUS PRN
Status: DISCONTINUED | OUTPATIENT
Start: 2023-10-18 | End: 2023-10-18 | Stop reason: HOSPADM

## 2023-10-18 RX ORDER — OXYCODONE HYDROCHLORIDE 5 MG/1
5 TABLET ORAL EVERY 4 HOURS PRN
Status: DISCONTINUED | OUTPATIENT
Start: 2023-10-18 | End: 2023-10-20 | Stop reason: HOSPADM

## 2023-10-18 RX ORDER — KETOROLAC TROMETHAMINE 30 MG/ML
INJECTION, SOLUTION INTRAMUSCULAR; INTRAVENOUS PRN
Status: DISCONTINUED | OUTPATIENT
Start: 2023-10-18 | End: 2023-10-18

## 2023-10-18 RX ORDER — MODIFIED LANOLIN
OINTMENT (GRAM) TOPICAL
Status: DISCONTINUED | OUTPATIENT
Start: 2023-10-18 | End: 2023-10-20 | Stop reason: HOSPADM

## 2023-10-18 RX ORDER — PROCHLORPERAZINE 25 MG
25 SUPPOSITORY, RECTAL RECTAL EVERY 12 HOURS PRN
Status: DISCONTINUED | OUTPATIENT
Start: 2023-10-18 | End: 2023-10-20 | Stop reason: HOSPADM

## 2023-10-18 RX ORDER — ONDANSETRON 2 MG/ML
4 INJECTION INTRAMUSCULAR; INTRAVENOUS EVERY 30 MIN PRN
Status: DISCONTINUED | OUTPATIENT
Start: 2023-10-18 | End: 2023-10-18

## 2023-10-18 RX ORDER — OXYTOCIN 10 [USP'U]/ML
10 INJECTION, SOLUTION INTRAMUSCULAR; INTRAVENOUS
Status: DISCONTINUED | OUTPATIENT
Start: 2023-10-18 | End: 2023-10-20 | Stop reason: HOSPADM

## 2023-10-18 RX ORDER — FENTANYL CITRATE 50 UG/ML
INJECTION, SOLUTION INTRAMUSCULAR; INTRAVENOUS
Status: COMPLETED | OUTPATIENT
Start: 2023-10-18 | End: 2023-10-18

## 2023-10-18 RX ORDER — MISOPROSTOL 200 UG/1
400 TABLET ORAL
Status: DISCONTINUED | OUTPATIENT
Start: 2023-10-18 | End: 2023-10-20 | Stop reason: HOSPADM

## 2023-10-18 RX ORDER — NALBUPHINE HYDROCHLORIDE 20 MG/ML
2.5-5 INJECTION, SOLUTION INTRAMUSCULAR; INTRAVENOUS; SUBCUTANEOUS EVERY 6 HOURS PRN
Status: DISCONTINUED | OUTPATIENT
Start: 2023-10-18 | End: 2023-10-18

## 2023-10-18 RX ORDER — MORPHINE SULFATE 1 MG/ML
150 INJECTION, SOLUTION EPIDURAL; INTRATHECAL; INTRAVENOUS ONCE
Status: CANCELLED | OUTPATIENT
Start: 2023-10-18 | End: 2023-10-18

## 2023-10-18 RX ORDER — ONDANSETRON 2 MG/ML
4 INJECTION INTRAMUSCULAR; INTRAVENOUS EVERY 6 HOURS PRN
Status: DISCONTINUED | OUTPATIENT
Start: 2023-10-18 | End: 2023-10-20 | Stop reason: HOSPADM

## 2023-10-18 RX ORDER — ONDANSETRON 2 MG/ML
INJECTION INTRAMUSCULAR; INTRAVENOUS PRN
Status: DISCONTINUED | OUTPATIENT
Start: 2023-10-18 | End: 2023-10-18

## 2023-10-18 RX ORDER — METOCLOPRAMIDE 10 MG/1
10 TABLET ORAL EVERY 6 HOURS PRN
Status: DISCONTINUED | OUTPATIENT
Start: 2023-10-18 | End: 2023-10-20 | Stop reason: HOSPADM

## 2023-10-18 RX ORDER — SODIUM CHLORIDE, SODIUM LACTATE, POTASSIUM CHLORIDE, CALCIUM CHLORIDE 600; 310; 30; 20 MG/100ML; MG/100ML; MG/100ML; MG/100ML
INJECTION, SOLUTION INTRAVENOUS CONTINUOUS
Status: DISCONTINUED | OUTPATIENT
Start: 2023-10-18 | End: 2023-10-18

## 2023-10-18 RX ORDER — PROCHLORPERAZINE MALEATE 10 MG
10 TABLET ORAL EVERY 6 HOURS PRN
Status: DISCONTINUED | OUTPATIENT
Start: 2023-10-18 | End: 2023-10-20 | Stop reason: HOSPADM

## 2023-10-18 RX ORDER — HYDROCORTISONE 25 MG/G
CREAM TOPICAL 3 TIMES DAILY PRN
Status: DISCONTINUED | OUTPATIENT
Start: 2023-10-18 | End: 2023-10-20 | Stop reason: HOSPADM

## 2023-10-18 RX ORDER — IBUPROFEN 800 MG/1
800 TABLET, FILM COATED ORAL EVERY 6 HOURS
Status: DISCONTINUED | OUTPATIENT
Start: 2023-10-19 | End: 2023-10-20 | Stop reason: HOSPADM

## 2023-10-18 RX ORDER — ACETAMINOPHEN 325 MG/1
975 TABLET ORAL ONCE
Status: COMPLETED | OUTPATIENT
Start: 2023-10-18 | End: 2023-10-18

## 2023-10-18 RX ORDER — BISACODYL 10 MG
10 SUPPOSITORY, RECTAL RECTAL DAILY PRN
Status: DISCONTINUED | OUTPATIENT
Start: 2023-10-20 | End: 2023-10-20 | Stop reason: HOSPADM

## 2023-10-18 RX ORDER — OXYTOCIN 10 [USP'U]/ML
10 INJECTION, SOLUTION INTRAMUSCULAR; INTRAVENOUS
Status: DISCONTINUED | OUTPATIENT
Start: 2023-10-18 | End: 2023-10-18 | Stop reason: HOSPADM

## 2023-10-18 RX ORDER — AMOXICILLIN 250 MG
2 CAPSULE ORAL 2 TIMES DAILY
Status: DISCONTINUED | OUTPATIENT
Start: 2023-10-18 | End: 2023-10-20 | Stop reason: HOSPADM

## 2023-10-18 RX ORDER — BUPIVACAINE HYDROCHLORIDE 7.5 MG/ML
INJECTION, SOLUTION INTRASPINAL
Status: COMPLETED | OUTPATIENT
Start: 2023-10-18 | End: 2023-10-18

## 2023-10-18 RX ORDER — OXYTOCIN/0.9 % SODIUM CHLORIDE 30/500 ML
PLASTIC BAG, INJECTION (ML) INTRAVENOUS CONTINUOUS PRN
Status: DISCONTINUED | OUTPATIENT
Start: 2023-10-18 | End: 2023-10-18

## 2023-10-18 RX ORDER — SIMETHICONE 80 MG
80 TABLET,CHEWABLE ORAL 4 TIMES DAILY PRN
Status: DISCONTINUED | OUTPATIENT
Start: 2023-10-18 | End: 2023-10-20 | Stop reason: HOSPADM

## 2023-10-18 RX ORDER — MISOPROSTOL 200 UG/1
800 TABLET ORAL
Status: DISCONTINUED | OUTPATIENT
Start: 2023-10-18 | End: 2023-10-18 | Stop reason: HOSPADM

## 2023-10-18 RX ORDER — MORPHINE SULFATE 1 MG/ML
INJECTION, SOLUTION EPIDURAL; INTRATHECAL; INTRAVENOUS
Status: COMPLETED | OUTPATIENT
Start: 2023-10-18 | End: 2023-10-18

## 2023-10-18 RX ORDER — NICOTINE POLACRILEX 4 MG
15-30 LOZENGE BUCCAL
Status: DISCONTINUED | OUTPATIENT
Start: 2023-10-18 | End: 2023-10-20 | Stop reason: HOSPADM

## 2023-10-18 RX ORDER — KETOROLAC TROMETHAMINE 30 MG/ML
30 INJECTION, SOLUTION INTRAMUSCULAR; INTRAVENOUS EVERY 6 HOURS
Status: COMPLETED | OUTPATIENT
Start: 2023-10-18 | End: 2023-10-19

## 2023-10-18 RX ORDER — BUPIVACAINE HYDROCHLORIDE 2.5 MG/ML
INJECTION, SOLUTION EPIDURAL; INFILTRATION; INTRACAUDAL
Status: COMPLETED | OUTPATIENT
Start: 2023-10-18 | End: 2023-10-18

## 2023-10-18 RX ORDER — CARBOPROST TROMETHAMINE 250 UG/ML
250 INJECTION, SOLUTION INTRAMUSCULAR
Status: DISCONTINUED | OUTPATIENT
Start: 2023-10-18 | End: 2023-10-20 | Stop reason: HOSPADM

## 2023-10-18 RX ADMIN — ACETAMINOPHEN 975 MG: 325 TABLET ORAL at 11:58

## 2023-10-18 RX ADMIN — SENNOSIDES AND DOCUSATE SODIUM 1 TABLET: 50; 8.6 TABLET ORAL at 20:25

## 2023-10-18 RX ADMIN — CEFAZOLIN SODIUM 2 G: 2 INJECTION, SOLUTION INTRAVENOUS at 13:05

## 2023-10-18 RX ADMIN — ONDANSETRON 4 MG: 2 INJECTION INTRAMUSCULAR; INTRAVENOUS at 13:05

## 2023-10-18 RX ADMIN — Medication 600 ML/HR: at 13:33

## 2023-10-18 RX ADMIN — SODIUM CHLORIDE, POTASSIUM CHLORIDE, SODIUM LACTATE AND CALCIUM CHLORIDE 500 ML: 600; 310; 30; 20 INJECTION, SOLUTION INTRAVENOUS at 23:30

## 2023-10-18 RX ADMIN — ACETAMINOPHEN 975 MG: 325 TABLET ORAL at 18:03

## 2023-10-18 RX ADMIN — BUPIVACAINE HYDROCHLORIDE 20 ML: 2.5 INJECTION, SOLUTION EPIDURAL; INFILTRATION; INTRACAUDAL at 14:05

## 2023-10-18 RX ADMIN — SODIUM CHLORIDE, POTASSIUM CHLORIDE, SODIUM LACTATE AND CALCIUM CHLORIDE: 600; 310; 30; 20 INJECTION, SOLUTION INTRAVENOUS at 13:27

## 2023-10-18 RX ADMIN — KETOROLAC TROMETHAMINE 30 MG: 30 INJECTION, SOLUTION INTRAMUSCULAR; INTRAVENOUS at 20:20

## 2023-10-18 RX ADMIN — SODIUM CHLORIDE, POTASSIUM CHLORIDE, SODIUM LACTATE AND CALCIUM CHLORIDE: 600; 310; 30; 20 INJECTION, SOLUTION INTRAVENOUS at 11:56

## 2023-10-18 RX ADMIN — DEXAMETHASONE SODIUM PHOSPHATE 4 MG: 4 INJECTION, SOLUTION INTRA-ARTICULAR; INTRALESIONAL; INTRAMUSCULAR; INTRAVENOUS; SOFT TISSUE at 13:35

## 2023-10-18 RX ADMIN — BUPIVACAINE HYDROCHLORIDE IN DEXTROSE 1.6 ML: 7.5 INJECTION, SOLUTION SUBARACHNOID at 13:07

## 2023-10-18 RX ADMIN — SODIUM CITRATE AND CITRIC ACID MONOHYDRATE 30 ML: 500; 334 SOLUTION ORAL at 11:58

## 2023-10-18 RX ADMIN — FENTANYL CITRATE 15 MCG: 50 INJECTION INTRAMUSCULAR; INTRAVENOUS at 13:07

## 2023-10-18 RX ADMIN — KETOROLAC TROMETHAMINE 15 MG: 30 INJECTION, SOLUTION INTRAMUSCULAR at 13:40

## 2023-10-18 RX ADMIN — PHENYLEPHRINE HYDROCHLORIDE 0.5 MCG/KG/MIN: 10 INJECTION INTRAVENOUS at 13:12

## 2023-10-18 RX ADMIN — Medication 0.15 MG: at 13:07

## 2023-10-18 RX ADMIN — BUPIVACAINE 20 ML: 13.3 INJECTION, SUSPENSION, LIPOSOMAL INFILTRATION at 14:05

## 2023-10-18 ASSESSMENT — ACTIVITIES OF DAILY LIVING (ADL)
ADLS_ACUITY_SCORE: 18
ADLS_ACUITY_SCORE: 22
ADLS_ACUITY_SCORE: 18
ADLS_ACUITY_SCORE: 22
ADLS_ACUITY_SCORE: 18

## 2023-10-18 NOTE — ANESTHESIA PREPROCEDURE EVALUATION
Anesthesia Pre-Procedure Evaluation    Patient: Melissa Salinas   MRN: [id number] : 1990        Procedure : Procedure(s):  PRIMARY  SECTION          Past Medical History:   Diagnosis Date    Anxiety     Appendicitis 2020    Asthma     Depressive disorder       Past Surgical History:   Procedure Laterality Date    FL LAP,APPENDECTOMY N/A 2020    Procedure: APPENDECTOMY, LAPAROSCOPIC;  Surgeon: Darion Trinidad MD;  Location: [facility];  Service: General    WISDOM TOOTH EXTRACTION        No Known Allergies   Social History     Tobacco Use    Smoking status: Never    Smokeless tobacco: Never   Substance Use Topics    Alcohol use: Not Currently     Comment: 0-2/wk      Wt Readings from Last 1 Encounters:   10/04/23 113.9 kg (251 lb)        Anesthesia Evaluation   Pt has not had prior anesthetic         ROS/MED HX  ENT/Pulmonary:     (+)                     asthma                  Neurologic:  - neg neurologic ROS     Cardiovascular:    (-) PIH   METS/Exercise Tolerance:     Hematologic:       Musculoskeletal:       GI/Hepatic:  - neg GI/hepatic ROS  (-) GERD   Renal/Genitourinary:       Endo:     (+)               Obesity (BMI 40),   gestational diabetes (10 unit NPH at bedtime) and Insulin,    Psychiatric/Substance Use:     (+) psychiatric history anxiety       Infectious Disease:       Malignancy:       Other:   Fetal macrosomia s/f pLTCS at 39w           Physical Exam    Airway        Mallampati: II   TM distance: > 3 FB   Neck ROM: full   Mouth opening: > 3 cm    Respiratory Devices and Support         Dental       (+) Minor Abnormalities - some fillings, tiny chips      Cardiovascular             Pulmonary                 OUTSIDE LABS:  CBC:   Lab Results   Component Value Date    WBC 9.6 2023    WBC 9.6 2021    HGB 12.0 2023    HGB 12.8 2023    HCT 40.0 2023    HCT 37.9 2021     2023     2021     BMP:   Lab Results  "  Component Value Date     05/24/2021     07/18/2020    POTASSIUM 4.2 05/24/2021    POTASSIUM 4.1 07/18/2020    CHLORIDE 106 05/24/2021    CHLORIDE 106 07/18/2020    CO2 17 (L) 05/24/2021    CO2 25 07/18/2020    BUN 8 05/24/2021    BUN 12 07/18/2020    CR 0.65 05/24/2021    CR 0.70 07/18/2020    GLC 80 05/24/2021     07/18/2020     COAGS: No results found for: \"PTT\", \"INR\", \"FIBR\"  POC:   Lab Results   Component Value Date    HCG Positive (A) 02/16/2023     HEPATIC:   Lab Results   Component Value Date    ALBUMIN 3.5 05/24/2021    PROTTOTAL 6.9 05/24/2021    ALT <9 05/24/2021    AST 13 05/24/2021    ALKPHOS 73 05/24/2021    BILITOTAL 0.3 05/24/2021     OTHER:   Lab Results   Component Value Date    RAMONITA 8.7 05/24/2021    LIPASE 28 07/18/2020       Anesthesia Plan    ASA Status:  3    NPO Status:  NPO Appropriate    Anesthesia Type: Spinal.              Consents    Anesthesia Plan(s) and associated risks, benefits, and realistic alternatives discussed. Questions answered and patient/representative(s) expressed understanding.     - Discussed: Risks, Benefits and Alternatives for the PROCEDURE were discussed     - Discussed with:  Patient            Postoperative Care    Pain management: intrathecal morphine, Peripheral nerve block (Single Shot).        Comments:    Other Comments: SAB - fentanyl 15mcg, duramorph 150mcg  Zofran 4mg pre SAB  Phenylephrine gtt  Pitocin per protocol  Toradol 15mg at case closure if cleared with surgeon  TAP block per surgeon request for post op pain            Regla Gramajo MD  "

## 2023-10-18 NOTE — ANESTHESIA POSTPROCEDURE EVALUATION
Patient: Melissa Salinas    Procedure: Procedure(s):  PRIMARY  SECTION       Anesthesia Type:  Spinal    Note:  Disposition: Inpatient   Postop Pain Control: Uneventful            Sign Out: Well controlled pain   PONV: No   Neuro/Psych: Uneventful            Sign Out: Acceptable/Baseline neuro status   Airway/Respiratory: Uneventful            Sign Out: Acceptable/Baseline resp. status   CV/Hemodynamics: Uneventful            Sign Out: Acceptable CV status; No obvious hypovolemia; No obvious fluid overload   Other NRE: NONE   DID A NON-ROUTINE EVENT OCCUR? No           Last vitals:  Vitals:    10/18/23 1648 10/18/23 1715 10/18/23 1815   BP:  118/63 128/63   Pulse:  84 81   Resp:  16 16   Temp:      SpO2: 95% 98% 98%       Electronically Signed By: Jaspreet Vargas MD  2023  6:36 PM

## 2023-10-18 NOTE — PROGRESS NOTES
Patient is now a  who came in for a scheduled  section r/t suspected fetal macrosomia. Delivery of viable infant @ 1332 on 10/18/23 at 39w2d. APGARS of 9 and 9 were assigned to infant. No infant resuscitation was indicated after delivery. Skin to skin with infant initiated after delivery in OR. 3 vessel cord noted, cord segment and blood collected and sent to lab. Dressing has a small amount of drainage that is marked. Fundus firm, U/1 with scant to light bleeding. Successfully breastfeeding infant every 2-3 hours. Patient and significant other bonding well with infant.     Dancia Muñiz RN

## 2023-10-18 NOTE — ANESTHESIA CARE TRANSFER NOTE
Patient: Melissa Salinas    Procedure: Procedure(s):  PRIMARY  SECTION       Diagnosis: Maternal care for excessive fetal growth, third trimester, not applicable or unspecified [O36.63X0]  Diagnosis Additional Information: No value filed.    Anesthesia Type:   Spinal     Note:    Oropharynx: oropharynx clear of all foreign objects  Level of Consciousness: awake  Oxygen Supplementation: room air    Independent Airway: airway patency satisfactory and stable  Dentition: dentition unchanged  Vital Signs Stable: post-procedure vital signs reviewed and stable  Report to RN Given: handoff report given  Patient transferred to: Labor and Delivery    Handoff Report: Identifed the Patient, Identified the Reponsible Provider, Reviewed the pertinent medical history, Discussed the surgical course, Reviewed Intra-OP anesthesia mangement and issues during anesthesia, Set expectations for post-procedure period and Allowed opportunity for questions and acknowledgement of understanding      Vitals:  Vitals Value Taken Time   /69 10/18/23 1418   Temp 97.8    Pulse 68    Resp 12    SpO2 97 % 10/18/23 1418   Vitals shown include unfiled device data.    Electronically Signed By: FERNANDO Montes De Oca CRNA  2023  2:20 PM

## 2023-10-18 NOTE — OP NOTE
PROCEDURE NOTE:      NAME:  Melissa Salinas   RECORD # [id number]   ADMIT DATE: 10/18/2023    DATE OF SERVICE: 10/18/2023     PREOPERATIVE DIAGNOSIS:  39wks.  Maternal election for .      PROCEDURE: Low transverse  section      SURGEON:  Adela Nation DO, FACOG     ASSISTANT:  OR staff    ANESTHESIA: spinal    Delivery QBL (mL): 358     DRAINS: Altamirano catheter.    COMPLICATIONS: none apparent    FINDINGS: Normal uterus, tubes and ovaries bilateral. Normal appearance to the adnexae.  Live male infant born, Apgars of 9   at 1 minute,  9 at 5 minutes,   weight is 8lb 7oz.      CONSENT: Patient was met preoperatively where we discussed the procedure and the risks associated with the procedure.  She understood these to include but not limited to injury to adjacent organs including bowel, bladder, ureter, infection and bleeding. Understanding these risks her consents were signed.      PROCEDURE: Patient was brought to the operating room in stable condition.  After induction of a spinal anesthetic, fetal heart tones were checked and were stable. She was prepped and draped in sterile fashion for the procedure.  A timeout was then performed.      A pfannensteil skin incision was made to the level of the fascia.  The fascia was incised laterally. Kocher clamps were applied to the superior aspect of the incision which was sharply dissected from the rectus muscles.  The kocher clamps were then reapplied to the inferior aspect of the incision which was similarly sharply dissected from the rectus muscles.  The rectus muscles were  bluntly in the midline.  The peritoneum was entered sharply. This incision was then extended.  A bladder blade was introduced. The vesicouterine peritoneum was identified and a bladder flap was was then created.  A low transverse uterine incision was made and amniotic sac was ruptured revealing clear amniotic fluid.  The baby's head was then delivered.There was  not a nuchal cord noted. There was a spontaneous cry and therefore bulb suction was performed. The remainder of the infant was easily delivered. The cord was clamped x 2 and cut and the infant handed off to waiting nursing personnel.    The placenta was then manually removed from the uterus.  The uterus was exteriorized, covered with a moist laparotomy sponge and cleared of all clots and debris.  The uterine incision was closed with 0 vicryl from both angles in a running locking suture and  A second imbricating suture of 0 was used for hemostasis. The uterus was returned to the abdominopelvic cavity.  The pericolic gutters were cleared of all clots and debris.  The uterine incision was again inspected and noted to be hemostatic.  The rectus muscles were made hemostatic with the use of electrocautery. and brought together with figure-of-8 sutures of suture, the fascia was brought together with vicryl from both angles in a running nonlocking suture, met at the midline, the subcutaneous tissues were irrigated, made hemostatic with use of electrocautery and brought together with 3-0 plain.  Skin was closed with suture.  Patient tolerated this procedure well.  Sponge, lap and needle counts were correct x two.      Adela Nation DO, FACOG  10/18/2023 2:07 PM         CC: Nadeen Andino Megan Lynn [place], MD

## 2023-10-18 NOTE — ANESTHESIA PROCEDURE NOTES
TAP Procedure Note    Pre-Procedure   Staff -        Anesthesiologist:  Regla Good MD       Performed By: anesthesiologist       Location: OR       Procedure Start/Stop Times: 10/18/2023 2:05 PM and 10/18/2023 2:10 PM       Pre-Anesthestic Checklist: patient identified, IV checked, site marked, risks and benefits discussed, informed consent, monitors and equipment checked, pre-op evaluation, at physician/surgeon's request and post-op pain management  Timeout:       Correct Patient: Yes        Correct Procedure: Yes        Correct Site: Yes        Correct Position: Yes        Correct Laterality: Yes        Site Marked: Yes  Procedure Documentation  Procedure: TAP       Laterality: bilateral       Patient Position: supine       Patient Prep/Sterile Barriers: sterile gloves, mask       Skin prep: Chloraprep       Needle type: Stimuplex.       Needle Gauge: 21.        Needle Length (Inches): 6        Ultrasound guided       1. Ultrasound was used to identify targeted nerve, plexus, vascular marker, or fascial plane and place a needle adjacent to it in real-time.       2. Ultrasound was used to visualize the spread of anesthetic in close proximity to the above referenced structure.       3. A permanent image is entered into the patient's record.       4. The visualized anatomic structures appeared normal.       5. There were no apparent abnormal pathologic findings.    Assessment/Narrative         The placement was negative for: blood aspirated and site bleedingInjection painful: Sedated.       Paresthesias: No.       Bolus given via needle..        Secured via.        Insertion/Infusion Method: Single Shot       Injection made incrementally with aspirations every 5 mL.    Medication(s) Administered   Bupivacaine 0.25% PF (Infiltration) - Infiltration   20 mL - 10/18/2023 2:05:00 PM  Bupivacaine liposome (Exparel) 1.3% LA inj susp (Infiltration) - Infiltration   20 mL - 10/18/2023 2:05:00 PM  Medication  "Administration Time: 10/18/2023 2:05 PM     Comments:  10cc bupi 0.25% + 10cc exparel 1.3% per side      FOR [facility] ONLY:   Pain Team Contact information: please page the Pain Team Via [employer]. Search \"Pain\". During daytime hours, please page the attending first. At night please page the resident first.      "

## 2023-10-18 NOTE — ANESTHESIA PROCEDURE NOTES
"Lumbar puncture Procedure Note    Pre-Procedure   Staff -        Anesthesiologist:  Regla Good MD       Performed By: anesthesiologist       Location: OB       Procedure Start/Stop Times: 10/18/2023 1:07 PM and 10/18/2023 1:10 PM       Pre-Anesthestic Checklist: patient identified, IV checked, risks and benefits discussed, informed consent, monitors and equipment checked, pre-op evaluation, at physician/surgeon's request and post-op pain management  Timeout:       Correct Patient: Yes        Correct Procedure: Yes        Correct Site: Yes        Correct Position: Yes   Procedure Documentation  Procedure: lumbar puncture       Patient Position: sitting       Patient Prep/Sterile Barriers: sterile gloves, mask, patient draped       Skin prep: Chloraprep       Insertion Site: L3-4. (midline approach).       Needle Gauge: 24.        Needle Length (Inches): 4        Spinal Needle Type: Pencan       Introducer used       # of attempts: 1 and  # of redirects:  0    Assessment/Narrative         Paresthesias: No.       Sensory Level: T4       CSF fluid: clear.       Opening pressure was cmH2O while  Sitting.      Medication(s) Administered   0.75% Hyperbaric Bupivacaine (Intrathecal) - Intrathecal   1.6 mL - 10/18/2023 1:07:00 PM  Fentanyl PF (Intrathecal) - Intrathecal   15 mcg - 10/18/2023 1:07:00 PM  Morphine PF 1 mg/mL (Intrathecal) - Intrathecal   0.15 mg - 10/18/2023 1:07:00 PM  Medication Administration Time: 10/18/2023 1:07 PM     Comments:  Risk benefit alternatives explained. Patient agrees to undergo a spinal. No pain with needle advancement, no paresthesias, one pass.   Procedure done with no issues, no complications. Patient tolerated the procedure well.        FOR [facility] ([facility]) ONLY:   Pain Team Contact information: please page the Pain Team Via [employer]. Search \"Pain\". During daytime hours, please page the attending first. At night please page the resident first.      "

## 2023-10-18 NOTE — H&P
OB HISTORY AND PHYSICAL UPDATE ADMISSION EXAM    Melissa Salinas  1990  [id number]    HPI:  34yo  @ 39wks.  Here for elective primary  secondary to maternal anxiety.       Estimated Date of Delivery: Oct 23, 2023                       EGA 39w2d    OB History    Para Term  AB Living   1 0 0 0 0 0   SAB IAB Ectopic Multiple Live Births   0 0 0 0 0      # Outcome Date GA Lbr Volodymyr/2nd Weight Sex Delivery Anes PTL Lv   1 Current                Prenatal Complications Diabetes, Depression/anxiety, and Obesity    Exam:    /69 (BP Location: Right arm, Patient Position: Semi-Garcia's, Cuff Size: Adult Large)   Pulse 70   Temp 97.8  F (36.6  C) (Oral)   Resp 16   LMP 2023   SpO2 98%         HEENT          WNL              Heart              WNL               Lungs             WNL                      Abdomen        WNL                       Extremities     WNL                    Fetal Status    Category 1     Assessment: For  section    Plan: Planned  section.      Adela Nation DO, FACOG  10/18/2023 1:09 PM

## 2023-10-19 ENCOUNTER — APPOINTMENT (OUTPATIENT)
Dept: CT IMAGING | Facility: CLINIC | Age: 33
End: 2023-10-19
Attending: OBSTETRICS & GYNECOLOGY
Payer: COMMERCIAL

## 2023-10-19 LAB
BLD PROD TYP BPU: NORMAL
BLD PROD TYP BPU: NORMAL
BLOOD COMPONENT TYPE: NORMAL
BLOOD COMPONENT TYPE: NORMAL
CODING SYSTEM: NORMAL
CODING SYSTEM: NORMAL
CREAT SERPL-MCNC: 0.66 MG/DL (ref 0.51–0.95)
CROSSMATCH: NORMAL
CROSSMATCH: NORMAL
EGFRCR SERPLBLD CKD-EPI 2021: >90 ML/MIN/1.73M2
GLUCOSE BLDC GLUCOMTR-MCNC: 98 MG/DL (ref 70–99)
HGB BLD-MCNC: 7.9 G/DL (ref 11.7–15.7)
HGB BLD-MCNC: 8.2 G/DL (ref 11.7–15.7)
ISSUE DATE AND TIME: NORMAL
ISSUE DATE AND TIME: NORMAL
UNIT ABO/RH: NORMAL
UNIT ABO/RH: NORMAL
UNIT NUMBER: NORMAL
UNIT NUMBER: NORMAL
UNIT STATUS: NORMAL
UNIT STATUS: NORMAL
UNIT TYPE ISBT: 600
UNIT TYPE ISBT: 600

## 2023-10-19 PROCEDURE — 74174 CTA ABD&PLVS W/CONTRAST: CPT

## 2023-10-19 PROCEDURE — 36415 COLL VENOUS BLD VENIPUNCTURE: CPT | Performed by: OBSTETRICS & GYNECOLOGY

## 2023-10-19 PROCEDURE — P9016 RBC LEUKOCYTES REDUCED: HCPCS | Performed by: OBSTETRICS & GYNECOLOGY

## 2023-10-19 PROCEDURE — 120N000001 HC R&B MED SURG/OB

## 2023-10-19 PROCEDURE — 85018 HEMOGLOBIN: CPT | Performed by: OBSTETRICS & GYNECOLOGY

## 2023-10-19 PROCEDURE — 250N000011 HC RX IP 250 OP 636: Mod: JZ | Performed by: OBSTETRICS & GYNECOLOGY

## 2023-10-19 PROCEDURE — 250N000011 HC RX IP 250 OP 636: Performed by: OBSTETRICS & GYNECOLOGY

## 2023-10-19 PROCEDURE — 258N000003 HC RX IP 258 OP 636: Performed by: OBSTETRICS & GYNECOLOGY

## 2023-10-19 PROCEDURE — 250N000013 HC RX MED GY IP 250 OP 250 PS 637: Performed by: OBSTETRICS & GYNECOLOGY

## 2023-10-19 RX ORDER — IOPAMIDOL 755 MG/ML
100 INJECTION, SOLUTION INTRAVASCULAR ONCE
Status: COMPLETED | OUTPATIENT
Start: 2023-10-19 | End: 2023-10-19

## 2023-10-19 RX ADMIN — ACETAMINOPHEN 975 MG: 325 TABLET ORAL at 19:04

## 2023-10-19 RX ADMIN — ACETAMINOPHEN 975 MG: 325 TABLET ORAL at 12:36

## 2023-10-19 RX ADMIN — SENNOSIDES AND DOCUSATE SODIUM 2 TABLET: 50; 8.6 TABLET ORAL at 09:08

## 2023-10-19 RX ADMIN — KETOROLAC TROMETHAMINE 30 MG: 30 INJECTION, SOLUTION INTRAMUSCULAR; INTRAVENOUS at 02:58

## 2023-10-19 RX ADMIN — IBUPROFEN 800 MG: 800 TABLET ORAL at 23:02

## 2023-10-19 RX ADMIN — SENNOSIDES AND DOCUSATE SODIUM 1 TABLET: 50; 8.6 TABLET ORAL at 21:47

## 2023-10-19 RX ADMIN — SIMETHICONE 80 MG: 80 TABLET, CHEWABLE ORAL at 01:19

## 2023-10-19 RX ADMIN — KETOROLAC TROMETHAMINE 30 MG: 30 INJECTION, SOLUTION INTRAMUSCULAR; INTRAVENOUS at 09:08

## 2023-10-19 RX ADMIN — IOPAMIDOL 100 ML: 755 INJECTION, SOLUTION INTRAVENOUS at 14:19

## 2023-10-19 RX ADMIN — ACETAMINOPHEN 975 MG: 325 TABLET ORAL at 06:32

## 2023-10-19 RX ADMIN — SODIUM CHLORIDE, POTASSIUM CHLORIDE, SODIUM LACTATE AND CALCIUM CHLORIDE: 600; 310; 30; 20 INJECTION, SOLUTION INTRAVENOUS at 01:15

## 2023-10-19 RX ADMIN — SODIUM CHLORIDE, POTASSIUM CHLORIDE, SODIUM LACTATE AND CALCIUM CHLORIDE: 600; 310; 30; 20 INJECTION, SOLUTION INTRAVENOUS at 09:10

## 2023-10-19 RX ADMIN — ACETAMINOPHEN 975 MG: 325 TABLET ORAL at 00:23

## 2023-10-19 RX ADMIN — IBUPROFEN 800 MG: 800 TABLET ORAL at 16:50

## 2023-10-19 ASSESSMENT — ACTIVITIES OF DAILY LIVING (ADL)
ADLS_ACUITY_SCORE: 22

## 2023-10-19 NOTE — PROGRESS NOTES
POD 1  for LGA  Doing well, light headed overnight, could not take out donato.  Still unstable.  Given fluid flush overnight and ran IV at 125cc an hour.  Output overnight about 53cc an hour.  Pain is controlled    Hg 12 down to 8 this am, QBL in the 800s    /58 (BP Location: Right arm, Patient Position: Semi-Garcia's, Cuff Size: Adult Large)   Pulse 87   Temp 98.3  F (36.8  C) (Oral)   Resp 16   LMP 2023   SpO2 97%   Breastfeeding Unknown     Pt looks fine and alert  Abdomen is soft, not distended-we're not bleeding  Incision is intact, little bit of serosanguinous fluid    A/P POD 1 c/s primary for LGA  Keep donato in until she is more stable  Likely symptomatic due to drop in Hg from 12 to 8.  Will get better with IV fluids and time. Not bad on exam today   CHRISTI Thomson

## 2023-10-19 NOTE — PROGRESS NOTES
"RN got patient up. Paused to rest when dangling feet for a few minutes. Pt slowly stood up and marched inplace. Patient stated she felt \"better than last time\" and was willing to walk to the bathroom to sit on the toilet. Pt walked well to the bathroom and was able to sit down with assistance. While nurse was doing donato care, patient went from chatting to silent and unable to answer questions. Pt then had a syncopal episode lasting less than 10 seconds. Nurse held patient upright on the toilet while calling a staff assist. Pt was able to move and respond to questions quickly. Other nurses came in and helped to get patient back to bed using the sit to stand (tres steady).     Pt put supine and vitals taken. /65, pulse 85, oxygen 100%.     Pt very teary eyed and discouraged that she was unable to move around as she wished. Used wet clothes and words of encouragement to help cope. Mom able to calm down and cuddle baby.     Stat hemoglobin taken, now down to 7.9. 15 minute vitals taken /58, pulse 90, oxygen 97%.     Will continue to monitor.     Marina Davies RN    "

## 2023-10-19 NOTE — PROGRESS NOTES
Patient is symptomatic from blood loss.     Will transfuse 2 units.     Adela Nation DO, FACOG  10/19/2023 12:43 PM

## 2023-10-19 NOTE — PROGRESS NOTES
RN tried to get pt up, and pt had another episode of syncope. Pt was able to  place for a couple minutes, but began to feel light-headed before we were able to walk to the bathroom with her. Pt will continue to have donato in until she is able to ambulate without feeling dizzy.   IV fluid is continuing to run at 125 ml/hr.

## 2023-10-19 NOTE — PROGRESS NOTES
RN attempted to get pt up out of bed at the 6 hour post  raymond. When pt sat on edge of bed she became light headed and had a vasovagal response. She had ringing in her ears and stated she couldn't hear us. RN put the head of the bed down and coached the pt to take some deep breaths. Pt felt better after a couple minutes. RN called the provider and told her about the situation. Provider stated it was okay to leave the donato in until tomorrow AM.

## 2023-10-19 NOTE — PLAN OF CARE
Problem: Postpartum ( Delivery)  Goal: Optimal Pain Control and Function  Outcome: Progressing  Intervention: Prevent or Manage Pain  Recent Flowsheet Documentation  Taken 10/18/2023 2010 by Melody Hannah RN  Perineal Care:   perineum cleansed   absorbent brief/pad changed     Problem: Postpartum ( Delivery)  Goal: Absence of Infection Signs and Symptoms  Outcome: Progressing     Problem: Postpartum ( Delivery)  Goal: Fluid and Electrolyte Balance  Outcome: Progressing   Goal Outcome Evaluation:      Pt is doing well. VSS. Fundus is firm and midline. Bleeding is WNL at this time. RN attempted to get pt up around 20:15, pt felt dizzy and had a vasovagal response. Provider was paged regarding donato. Provider ordered for donato to stay in until the AM. Pt also had low urine output in donato bag, provider ordered a bolus and then maintenance fluids at 125 ml/hr throughout the night. RN is planning to get pt up in the AM. Donato will come out once pt is able to ambulate without feeling dizzy.

## 2023-10-19 NOTE — LACTATION NOTE
This note was copied from a baby's chart.  Referred to Melissa to assist with feedings. Nitin is the first baby for Mendel sophie Torres. She has a Spectra pump for home use.     At time of consult, Melissa had just finished nursing. She has several risk factors for a milk supply including gestational diabetes, scheduled c/s and BMI over 30. Because of these factors, pumping after feedings was highly recommended until oupt lactation assessment can be done.Melissa was willing to start pumping with a Symphony pump for 15 minutes. Any colostrum that is collected can be given to Nitin.     Oupt lactation services and ECFE were discussed. To continue to follow while inpt.    Massage services were called for a foot massage today.

## 2023-10-19 NOTE — CONSULTS
Integrative Therapy Consult    Healing PresenceYes  Essential Oils: Topical (EO/Topical Oil)     Paige -  HC, Lavender Massage Oil - HC       Healing Music:       Breathwork:       Guided Imagery:       Acupressure:       Oshibori:       Energy Therapy:       Healing Touch:       Reiki:       Qi Gong:     Massage: Foot, Targeted massage      Targeted Massage: Legs  Sleep Promotion:       Other Therapy:       Intervention Reason: Edema     Pre and Post Session Scores: Patient Desires Treatment: yes                             Delivery:         Referrals:      Rosa Hurtado

## 2023-10-19 NOTE — PROGRESS NOTES
Pt had only 75 ml of output in donato over 4 hours. Provider was notified of this. See new orders.

## 2023-10-20 VITALS
DIASTOLIC BLOOD PRESSURE: 56 MMHG | SYSTOLIC BLOOD PRESSURE: 118 MMHG | TEMPERATURE: 97.8 F | WEIGHT: 253.4 LBS | RESPIRATION RATE: 17 BRPM | OXYGEN SATURATION: 99 % | HEART RATE: 96 BPM | BODY MASS INDEX: 41.34 KG/M2

## 2023-10-20 PROBLEM — O36.60X0 LGA (LARGE FOR GESTATIONAL AGE) FETUS AFFECTING MANAGEMENT OF MOTHER: Status: ACTIVE | Noted: 2023-10-20

## 2023-10-20 LAB — HGB BLD-MCNC: 8.9 G/DL (ref 11.7–15.7)

## 2023-10-20 PROCEDURE — 85018 HEMOGLOBIN: CPT | Performed by: OBSTETRICS & GYNECOLOGY

## 2023-10-20 PROCEDURE — 250N000013 HC RX MED GY IP 250 OP 250 PS 637: Performed by: OBSTETRICS & GYNECOLOGY

## 2023-10-20 PROCEDURE — 36415 COLL VENOUS BLD VENIPUNCTURE: CPT | Performed by: OBSTETRICS & GYNECOLOGY

## 2023-10-20 RX ORDER — FERROUS SULFATE 325(65) MG
325 TABLET ORAL
Qty: 28 TABLET | Refills: 0 | Status: SHIPPED | OUTPATIENT
Start: 2023-10-20

## 2023-10-20 RX ORDER — CETIRIZINE HYDROCHLORIDE 10 MG/1
10 TABLET ORAL DAILY
Status: DISCONTINUED | OUTPATIENT
Start: 2023-10-20 | End: 2023-10-20 | Stop reason: HOSPADM

## 2023-10-20 RX ORDER — IBUPROFEN 600 MG/1
600 TABLET, FILM COATED ORAL EVERY 6 HOURS PRN
Qty: 40 TABLET | Refills: 0 | Status: SHIPPED | OUTPATIENT
Start: 2023-10-20

## 2023-10-20 RX ORDER — FLUTICASONE PROPIONATE AND SALMETEROL 250; 50 UG/1; UG/1
1 POWDER RESPIRATORY (INHALATION) 2 TIMES DAILY
Status: DISCONTINUED | OUTPATIENT
Start: 2023-10-20 | End: 2023-10-20 | Stop reason: HOSPADM

## 2023-10-20 RX ORDER — SENNA AND DOCUSATE SODIUM 50; 8.6 MG/1; MG/1
1 TABLET, FILM COATED ORAL AT BEDTIME
Qty: 28 TABLET | Refills: 0 | Status: SHIPPED | OUTPATIENT
Start: 2023-10-20

## 2023-10-20 RX ORDER — ACETAMINOPHEN 500 MG
500-1000 TABLET ORAL EVERY 6 HOURS PRN
Qty: 60 TABLET | Refills: 0 | Status: SHIPPED | OUTPATIENT
Start: 2023-10-20

## 2023-10-20 RX ORDER — OXYCODONE HYDROCHLORIDE 5 MG/1
5 TABLET ORAL EVERY 6 HOURS PRN
Qty: 12 TABLET | Refills: 0 | Status: SHIPPED | OUTPATIENT
Start: 2023-10-20 | End: 2023-10-23

## 2023-10-20 RX ORDER — MONTELUKAST SODIUM 10 MG/1
10 TABLET ORAL DAILY
Status: DISCONTINUED | OUTPATIENT
Start: 2023-10-20 | End: 2023-10-20 | Stop reason: HOSPADM

## 2023-10-20 RX ADMIN — ACETAMINOPHEN 975 MG: 325 TABLET ORAL at 13:05

## 2023-10-20 RX ADMIN — IBUPROFEN 800 MG: 800 TABLET ORAL at 06:31

## 2023-10-20 RX ADMIN — ACETAMINOPHEN 975 MG: 325 TABLET ORAL at 07:51

## 2023-10-20 RX ADMIN — ACETAMINOPHEN 975 MG: 325 TABLET ORAL at 01:22

## 2023-10-20 RX ADMIN — IBUPROFEN 800 MG: 800 TABLET ORAL at 13:05

## 2023-10-20 RX ADMIN — SENNOSIDES AND DOCUSATE SODIUM 2 TABLET: 50; 8.6 TABLET ORAL at 08:25

## 2023-10-20 ASSESSMENT — ACTIVITIES OF DAILY LIVING (ADL)
ADLS_ACUITY_SCORE: 22

## 2023-10-20 NOTE — PLAN OF CARE
Goal Outcome Evaluation:       Pt bonding with baby and vitals stable. Her fundus is midline and firm without massage and bleeding is scant. Her incision is covered with dressing which has small old drainage. Her pain is well managed with tylenol and ibuprofen. She is breastfeeding baby and supplementing donor milk.    She had blood transfusion today after her syncope episode due to her low HGB. She tolerated the transfusion well with no symptoms. The first unit was started at 1600. The second unit was started at 1845 and is still running. Pt stable at this time. Report given to incoming nurses.       Problem: Adult Inpatient Plan of Care  Goal: Readiness for Transition of Care  Outcome: Progressing     Problem: Postpartum ( Delivery)  Goal: Absence of Infection Signs and Symptoms  Outcome: Progressing

## 2023-10-20 NOTE — PROGRESS NOTES
"Outreach Note for [facility]    Chart reviewed, discharge plan discussed with patient, needs assessed. Patient verbalizes understanding of plan, requests [facility] as ordered, [facility] nurse visit planned for Sat.10/21/23, Home Care Intake updated. Patient and , \"Nitin\", phone number is reported as correct in EMR.    Patient states she has good support at home, has baby care essentials, and feels ready to discharge today. Outreach RN will continue to follow and assist if needed with discharge plan. No additional needs identified at this time.      "

## 2023-10-20 NOTE — PLAN OF CARE
Problem: Breastfeeding  Goal: Effective Breastfeeding  10/20/2023 0623 by Rebeca Santo RN  Outcome: Progressing       Problem: Postpartum ( Delivery)  Goal: Hemostasis  Outcome: Progressing   Goal Outcome Evaluation:       Patient was able to ambulate to the bathroom free of symptoms after completing two units of blood. She has breast fed multiple times this shifts. She has become more confident interpreting her son's feeding cues and breastfeeding on demand.

## 2023-10-20 NOTE — DISCHARGE SUMMARY
[facility] Discharge Summary    Melissa Salinas MRN# [id number]   Age: 33 year old YOB: 1990     Date of Admission:  10/18/2023  Date of Discharge::  10/20/2023  Admitting Physician:  Adela Nation MD  Discharge Physician:  Joy Powell MD     Home clinic:           Admission Diagnoses:   Maternal care for excessive fetal growth, third trimester, not applicable or unspecified [O36.63X0]  Gestational diabetes on insulin          Discharge Diagnosis:     Maternal care for excessive fetal growth, third trimester, not applicable or unspecified [O36.63X0]  Acute blood loss anemia - clinically insignificant              Procedures:     Procedure(s): Primary low transverse  section                  Medications Prior to Admission:     Medications Prior to Admission   Medication Sig Dispense Refill Last Dose    acetone urine (KETOSTIX) test strip Use once daily in the morning to check urine for ketones. 50 strip 1     albuterol (PROAIR HFA/PROVENTIL HFA/VENTOLIN HFA) 108 (90 Base) MCG/ACT inhaler [ALBUTEROL (PROAIR HFA;PROVENTIL HFA;VENTOLIN HFA) 90 MCG/ACTUATION INHALER] 1-2 puffs every 4-6 hours, as needed for wheezing. 18 g 3     blood glucose (ACCU-CHEK GUIDE) test strip Use to test blood sugar 4 times daily or as directed. 200 strip 3     blood glucose monitoring (SOFTCLIX) lancets Use to test blood sugar 4 times daily or as directed. 200 each 3     cetirizine (ZYRTEC) 10 MG tablet [CETIRIZINE (ZYRTEC) 10 MG TABLET] Take 10 mg by mouth daily.       EPINEPHrine (AUVI-Q) 0.3 MG/0.3ML injection 2-pack Inject into outer thigh for allergic reaction 2 each 0     fluticasone-salmeterol (ADVAIR DISKUS) 250-50 MCG/ACT inhaler INHALE 1 PUFF INTO THE LUNGS EVERY 12 HOURS 60 each 11     hydrOXYzine (ATARAX) 10 MG tablet Take 1 tablet (10 mg) by mouth 3 times daily as needed for anxiety 30 tablet 1     insulin pen needle (32G X 4 MM) 32G X 4 MM miscellaneous Use 4 pen needles  daily or as directed. 200 each 3     montelukast (SINGULAIR) 10 MG tablet TAKE 1 TABLET(10 MG) BY MOUTH DAILY 90 tablet 0     ORDER FOR ALLERGEN IMMUNOTHERAPY Vaccine A MOLD/ INDOORALLERGENS/ RAGWEED  ALT Alternaria Tenuis 1:10 w/v, 0.5 ml  DFM Df Mite D farinae 10,000 AU, 0.5 ml  DPM Dp Mite D pteronyssinus. 10,000 AU, 0.5 ml  DOG AP Dog hair & dander, mixed breeds 1:10 w/v, 0.5 ml  Vaccine B POLLENS/ CAT  G GRASS MIX [place] Blue, Orchard, Redtop, Jesus 100, 000 BAU 0.3 ml  CAT Cat Hair 10,000 BAU 2.0 ml  Dilutions: None (red) Frequency: weekly  1/10 (yellow) Frequency: weekly   (blue) Frequency: weekly  1000 (green) Frequency: weekly      Diluent: HSA qs to 5ml 5 mL 1     Prenatal Vit-Fe Fumarate-FA (PRENATAL VITAMIN PO) Take by mouth daily       [DISCONTINUED] insulin lispro (HUMALOG KWIKPEN) 100 UNIT/ML (1 unit dial) KWIKPEN Take 6 units with meals, may titrate up to 50 units a day. 15 mL 1     [DISCONTINUED] insulin NPH (HUMULIN N KWIKPEN) 100 UNIT/ML injection Take 10 units at HS, increase 2 units every 2 days, may titrate up to 50 units daily 15 mL 1              Discharge Medications:     Current Discharge Medication List        START taking these medications    Details   acetaminophen (TYLENOL) 500 MG tablet Take 1-2 tablets (500-1,000 mg) by mouth every 6 hours as needed for mild pain  Qty: 60 tablet, Refills: 0    Associated Diagnoses:  delivery delivered      ferrous sulfate (FEROSUL) 325 (65 Fe) MG tablet Take 1 tablet (325 mg) by mouth daily (with breakfast)  Qty: 28 tablet, Refills: 0    Associated Diagnoses: Anemia due to blood loss, acute      ibuprofen (ADVIL/MOTRIN) 600 MG tablet Take 1 tablet (600 mg) by mouth every 6 hours as needed  Qty: 40 tablet, Refills: 0    Associated Diagnoses:  delivery delivered      oxyCODONE (ROXICODONE) 5 MG tablet Take 1 tablet (5 mg) by mouth every 6 hours as needed for pain  Qty: 12 tablet, Refills: 0    Associated Diagnoses:   delivery delivered      SENNA-docusate sodium (SENNA S) 8.6-50 MG tablet Take 1 tablet by mouth at bedtime  Qty: 28 tablet, Refills: 0    Associated Diagnoses:  delivery delivered           CONTINUE these medications which have NOT CHANGED    Details   acetone urine (KETOSTIX) test strip Use once daily in the morning to check urine for ketones.  Qty: 50 strip, Refills: 1    Associated Diagnoses: Gestational diabetes mellitus (GDM) in second trimester, gestational diabetes method of control unspecified      albuterol (PROAIR HFA/PROVENTIL HFA/VENTOLIN HFA) 108 (90 Base) MCG/ACT inhaler [ALBUTEROL (PROAIR HFA;PROVENTIL HFA;VENTOLIN HFA) 90 MCG/ACTUATION INHALER] 1-2 puffs every 4-6 hours, as needed for wheezing.  Qty: 18 g, Refills: 3    Comments: Pharmacy may dispense brand covered by insurance (Proair, or proventil or ventolin or generic albuterol inhaler)  Associated Diagnoses: Moderate persistent asthma without complication      blood glucose (ACCU-CHEK GUIDE) test strip Use to test blood sugar 4 times daily or as directed.  Qty: 200 strip, Refills: 3    Associated Diagnoses: Gestational diabetes mellitus (GDM) in second trimester, gestational diabetes method of control unspecified      blood glucose monitoring (SOFTCLIX) lancets Use to test blood sugar 4 times daily or as directed.  Qty: 200 each, Refills: 3    Associated Diagnoses: Gestational diabetes mellitus (GDM) in second trimester, gestational diabetes method of control unspecified      cetirizine (ZYRTEC) 10 MG tablet [CETIRIZINE (ZYRTEC) 10 MG TABLET] Take 10 mg by mouth daily.      EPINEPHrine (AUVI-Q) 0.3 MG/0.3ML injection 2-pack Inject into outer thigh for allergic reaction  Qty: 2 each, Refills: 0    Associated Diagnoses: Allergic rhinitis caused by mold; Allergic rhinitis due to dust mite; Seasonal allergic rhinitis due to pollen      fluticasone-salmeterol (ADVAIR DISKUS) 250-50 MCG/ACT inhaler INHALE 1 PUFF INTO THE LUNGS EVERY 12  HOURS  Qty: 60 each, Refills: 11    Associated Diagnoses: Moderate persistent asthma without complication      hydrOXYzine (ATARAX) 10 MG tablet Take 1 tablet (10 mg) by mouth 3 times daily as needed for anxiety  Qty: 30 tablet, Refills: 1    Associated Diagnoses: Anxiety      insulin pen needle (32G X 4 MM) 32G X 4 MM miscellaneous Use 4 pen needles daily or as directed.  Qty: 200 each, Refills: 3    Associated Diagnoses: Gestational diabetes mellitus (GDM) in second trimester, gestational diabetes method of control unspecified      montelukast (SINGULAIR) 10 MG tablet TAKE 1 TABLET(10 MG) BY MOUTH DAILY  Qty: 90 tablet, Refills: 0    Associated Diagnoses: Moderate persistent asthma without complication; Seasonal allergies      ORDER FOR ALLERGEN IMMUNOTHERAPY Vaccine A MOLD/ INDOORALLERGENS/ RAGWEED  ALT Alternaria Tenuis 1:10 w/v, 0.5 ml  DFM Df Mite D farinae 10,000 AU, 0.5 ml  DPM Dp Mite D pteronyssinus. 10,000 AU, 0.5 ml  DOG AP Dog hair & dander, mixed breeds 1:10 w/v, 0.5 ml  Vaccine B POLLENS/ CAT  G GRASS MIX [place] Blue, [place]chard, Redtop, Jesus 100, 000 BAU 0.3 ml  CAT Cat Hair 10,000 BAU 2.0 ml  Dilutions: None (red) Frequency: weekly  1/10 (yellow) Frequency: weekly  1/100 (blue) Frequency: weekly  1/1000 (green) Frequency: weekly      Diluent: HSA qs to 5ml  Qty: 5 mL, Refills: 1    Associated Diagnoses: Allergic rhinitis caused by mold; Allergic rhinitis due to dust mite; Seasonal allergic rhinitis due to pollen      Prenatal Vit-Fe Fumarate-FA (PRENATAL VITAMIN PO) Take by mouth daily           STOP taking these medications       insulin lispro (HUMALOG KWIKPEN) 100 UNIT/ML (1 unit dial) KWIKPEN Comments:   Reason for Stopping:         insulin NPH (HUMULIN N KWIKPEN) 100 UNIT/ML injection Comments:   Reason for Stopping:                     Consultations:   No consultations were requested during this admission          Brief History of Labor or Admission:   Admitted for surgery- prmary csection   for LGA, surgery without complications             Hospital Course:   The patient's hospital course was unremarkable.  She recovered as anticipated and experienced no post-operative complications.  On discharge, her pain was well controlled. Vaginal bleeding is similar to peak menstrual flow.  Voiding without difficulty.  Ambulating well and tolerating a normal diet.  No fever or significant wound drainage.  Breastfeeding well.  Infant is stable.  No bowel movement yet.  She was discharged on post-partum day #2.    Post-partum hemoglobin:   Hemoglobin   Date Value Ref Range Status   10/20/2023 8.9 (L) 11.7 - 15.7 g/dL Final             Discharge Instructions and Follow-Up:     Discharge diet: Regular   Discharge activity: No heavy lifting for 6 week(s)  No driving or operating machinery while on narcotic analgesics   Discharge follow-up: Follow up with  [facility]  in 2 weeks   Wound care: Ice to area for comfort  Keep wound clean and dry           Discharge Disposition:     Discharged to home      Attestation:  I have reviewed today's vital signs, notes, medications, labs and imaging.    Joy Powell MD

## 2023-10-20 NOTE — PLAN OF CARE
Goal Outcome Evaluation:      Plan of Care Reviewed With: patient    Overall Patient Progress: improvingOverall Patient Progress: improving         Problem: Adult Inpatient Plan of Care  Goal: Optimal Comfort and Wellbeing  Outcome: Progressing  Intervention: Monitor Pain and Promote Comfort  Recent Flowsheet Documentation  Taken 10/20/2023 0753 by Manju Ho RN  Pain Management Interventions: medication (see MAR)  Intervention: Provide Person-Centered Care  Recent Flowsheet Documentation  Taken 10/20/2023 0801 by Manju Ho RN  Trust Relationship/Rapport:   care explained   choices provided   reassurance provided   thoughts/feelings acknowledged   questions answered   questions encouraged  Taken 10/20/2023 0753 by Manju Ho RN  Trust Relationship/Rapport:   care explained   choices provided   reassurance provided   thoughts/feelings acknowledged   questions answered   questions encouraged     Pt's VS are WDL. Up ad ashely, activity encouraged. Pt meets discharge criteria. All discharge instructions/education provided. Any questions answered. No further concerns as of present. Plan of care ongoing.

## 2023-10-20 NOTE — DISCHARGE INSTRUCTIONS
Warning Signs after Having a Baby    Keep this paper on your fridge or somewhere else where you can see it.    Call your provider if you have any of these symptoms up to 12 weeks after having your baby.    Thoughts of hurting yourself or your baby  Pain in your chest or trouble breathing  Severe headache not helped by pain medicine  Eyesight concerns (blurry vision, seeing spots or flashes of light, other changes to eyesight)  Fainting, shaking or other signs of a seizure    Call 9-1-1 if you feel that it is an emergency.     The symptoms below can happen to anyone after giving birth. They can be very serious. Call your provider if you have any of these warning signs.    My provider s phone number: _______________________    Losing too much blood (hemorrhage)    Call your provider if you soak through a pad in less than an hour or pass blood clots bigger than a golf ball. These may be signs that you are bleeding too much.    Blood clots in the legs or lungs    After you give birth, your body naturally clots its blood to help prevent blood loss. Sometimes this increased clotting can happen in other areas of the body, like the legs or lungs. This can block your blood flow and be very dangerous.     Call your provider if you:  Have a red, swollen spot on the back of your leg that is warm or painful when you touch it.   Are coughing up blood.     Infection    Call your provider if you have any of these symptoms:  Fever of 100.4 F (38 C) or higher.  Pain or redness around your stitches if you had an incision.   Any yellow, white, or green fluid coming from places where you had stitches or surgery.    Mood Problems (postpartum depression)    Many people feel sad or have mood changes after having a baby. But for some people, these mood swings are worse.     Call your provider right away if you feel so anxious or nervous that you can't care for yourself or your baby.    Preeclampsia (high blood pressure)    Even if you  didn't have high blood pressure when you were pregnant, you are at risk for the high blood pressure disease called preeclampsia. This risk can last up to 12 weeks after giving birth.     Call your provider if you have:   Pain on your right side under your rib cage  Sudden swelling in the hands and face    Remember: You know your body. If something doesn't feel right, get medical help.     For informational purposes only. Not to replace the advice of your health care provider. Copyright 2020 [facility]. All rights reserved. Clinically reviewed by Shalini Bauman, RNC-OB, MSN. [employer] 470077 - Rev .    Postop  Birth Instructions    Activity     Do not lift more than 10 pounds for 6 weeks after surgery.  Ask family and friends for help when you need it.  No driving until you have stopped taking your pain medications (usually two weeks after surgery).  No heavy exercise or activity for 6 weeks.  Don't do anything that will put a strain on your surgery site.  Don't strain when using the toilet.  Your care team may prescribe a stool softener if you have problems with your bowel movements.     To care for your incision:     Keep the incision clean and dry.  Do not soak your incision in water. No swimming or hot tubs until it has fully healed. You may soak in the bathtub if the water level is below your incision.  Do not use peroxide, gel, cream, lotion, or ointment on your incision.  Adjust your clothes to avoid pressure on your surgery site (check the elastic in your underwear for example).     You may see a small amount of clear or pink drainage and this is normal.  Check with your health care provider:     If the drainage increases or has an odor.  If the incision reddens, you have swelling, or develop a rash.  If you have increased pain and the medicine we prescribed doesn't help.  If you have a fever above 100.4 F (38 C) with or without chills when placing thermometer under your tongue.    The area around your incision (surgery wound), will feel numb.  This is normal. The numbness should go away in less than a year.     Keep your hands clean:  Always wash your hands before touching your incision (surgery wound). This helps reduce your risk of infection. If your hands aren't dirty, you may use an alcohol hand-rub to clean your hands. Keep your nails clean and short.    Call your healthcare provider if you have any of these symptoms:     You soak a sanitary pad with blood within 1 hour, or you see blood clots larger than a golf ball.  Bleeding that lasts more than 6 weeks.  Vaginal discharge that smells bad.  Severe pain, cramping or tenderness in your lower belly area.  A need to urinate more frequently (use the toilet more often), more urgently (use the toilet very quickly), or it burns when you urinate.  Nausea and vomiting.  Redness, swelling or pain around a vein in your leg.  Problems breastfeeding or a red or painful area on your breast.  Chest pain and cough or are gasping for air.  Problems with coping with sadness, anxiety or depression. If you have concerns about hurting yourself or the baby, call your provider immediately.    You have questions or concerns after you return home.

## 2023-10-22 ENCOUNTER — PATIENT OUTREACH (OUTPATIENT)
Dept: CARE COORDINATION | Facility: CLINIC | Age: 33
End: 2023-10-22
Payer: COMMERCIAL

## 2023-10-22 NOTE — PROGRESS NOTES
"[facility] offered Clinic Care Coordination to an established [facility] eligible patient and patient declined CCC at this time.     Clinic Care Coordination Contact  [facility]: Post-Discharge Note  SITUATION                                                      Admission:    Admission Date: 10/18/23   Reason for Admission: Pregnancy episode  Discharge:   Discharge Date: 10/20/23  Discharge Diagnosis: Maternal care for excessive fetal growth, third trimester, not applicable or unspecified (O36.63X0), Acute blood loss anemia - clinically insignificant. Procedure(s): Primary low transverse  section    BACKGROUND                                                      Per hospital discharge summary and inpatient provider notes:    HPI:  32yo  @ 39wks.  Here for elective primary  secondary to maternal anxiety.        Estimated Date of Delivery: Oct 23, 2023                        EGA [id number]    ASSESSMENT      Discharge Assessment  How are you doing now that you are home?: \"I'm doing well thank you. I think we doing alright.\"  How are your symptoms? (Red Flag symptoms escalate to triage hotline per guidelines): Improved  Do you feel your condition is stable enough to be safe at home until your provider visit?: Yes  Does the patient have their discharge instructions? : Yes  Does the patient have questions regarding their discharge instructions? : No  Were you started on any new medications or were there changes to any of your previous medications? : Yes  Does the patient have all of their medications?: Yes  Do you have questions regarding any of your medications? : No  Do you have all of your needed medical supplies or equipment (DME)?  (i.e. oxygen tank, CPAP, cane, etc.): Yes  Discharge follow-up appointment scheduled within 14 calendar days? : Yes  Discharge Follow Up Appointment Date: 10/25/23  Discharge Follow Up Appointment Scheduled with?: Specialty Care Provider (OB/GYN appt.)    Post-op ([facility] " Only)  If the patient had a surgery or procedure, do they have any questions for a nurse?: No    PLAN                                                      Outpatient Plan:     Follow Up  Follow up with provider in 2 weeks and 6 weeks for post-delivery checks    Additional Services:  Postpartum Home Care Referral  If your home visit was not scheduled during your hospital stay, you should receive a call from [facility] within 24 hours after discharge to schedule your ordered home visit. If you have not heard by then, please call [phone].    No future appointments.      For any urgent concerns, please contact our 24 hour nurse triage line: [phone] ([phone])         GRAY Banks  [phone]  [facility]

## 2023-12-07 DIAGNOSIS — J45.40 MODERATE PERSISTENT ASTHMA WITHOUT COMPLICATION: ICD-10-CM

## 2023-12-07 DIAGNOSIS — J30.2 SEASONAL ALLERGIES: ICD-10-CM

## 2023-12-07 RX ORDER — MONTELUKAST SODIUM 10 MG/1
10 TABLET ORAL DAILY
Qty: 30 TABLET | Refills: 0 | Status: SHIPPED | OUTPATIENT
Start: 2023-12-07

## 2023-12-24 ENCOUNTER — HEALTH MAINTENANCE LETTER (OUTPATIENT)
Age: 33
End: 2023-12-24

## 2024-03-20 ENCOUNTER — LAB REQUISITION (OUTPATIENT)
Dept: LAB | Facility: CLINIC | Age: 34
End: 2024-03-20

## 2024-03-20 DIAGNOSIS — Z12.4 ENCOUNTER FOR SCREENING FOR MALIGNANT NEOPLASM OF CERVIX: ICD-10-CM

## 2024-03-20 PROCEDURE — G0145 SCR C/V CYTO,THINLAYER,RESCR: HCPCS | Performed by: OBSTETRICS & GYNECOLOGY

## 2024-03-25 LAB
BKR LAB AP GYN ADEQUACY: NORMAL
BKR LAB AP GYN INTERPRETATION: NORMAL
BKR LAB AP HPV REFLEX: NORMAL
BKR LAB AP LMP: NORMAL
BKR LAB AP PREVIOUS ABNL DX: NORMAL
BKR LAB AP PREVIOUS ABNORMAL: NORMAL
PATH REPORT.COMMENTS IMP SPEC: NORMAL
PATH REPORT.COMMENTS IMP SPEC: NORMAL
PATH REPORT.RELEVANT HX SPEC: NORMAL

## 2025-01-18 ENCOUNTER — HEALTH MAINTENANCE LETTER (OUTPATIENT)
Age: 35
End: 2025-01-18

## (undated) DEVICE — BLADE CLIPPER DISP 4406

## (undated) DEVICE — DRSG PRIMAPORE 04X11 3/4"

## (undated) DEVICE — SUTURE MONOCRYL+ 3-0 27 UND MCP523H

## (undated) DEVICE — PREP CHLORAPREP 26ML TINTED HI-LITE ORANGE 930815

## (undated) DEVICE — SOL WATER IRRIG 1000ML BOTTLE 2F7114

## (undated) DEVICE — SUTURE MONOCRYL+ 0 CT-1 UND 18 MCP946H

## (undated) DEVICE — SOL NACL 0.9% IRRIG 1000ML BOTTLE 2F7124

## (undated) DEVICE — GLOVE UNDER INDICATOR PI SZ 6.5 LF 41665

## (undated) DEVICE — CUSTOM PACK C-SECTION LHE

## (undated) DEVICE — UNDERPAD 30X30 BULK 949B10

## (undated) DEVICE — PLATE GROUNDING ADULT W/CORD 9165L

## (undated) DEVICE — PACK MINOR SINGLE BASIN SSK3001

## (undated) DEVICE — DRESSING MEPILEX ADH 4INX10IN STRL LF 496455

## (undated) DEVICE — GOWN IMPERVIOUS BREATHABLE SMART LG 89015

## (undated) DEVICE — SCALPEL DISP SAFETY #10

## (undated) DEVICE — SURGICEL POWDER ABSORBABLE HEMOSTAT 3GM 3013SP

## (undated) DEVICE — STPL SKIN SUBCUTICULAR INSORB  2030

## (undated) DEVICE — SUTURE VICRYL+ 0 36IN CT-1 UND VCP946H

## (undated) DEVICE — GLOVE BIOGEL PI ULTRATOUCH G SZ 6.5 42165

## (undated) RX ORDER — KETOROLAC TROMETHAMINE 30 MG/ML
INJECTION, SOLUTION INTRAMUSCULAR; INTRAVENOUS
Status: DISPENSED
Start: 2023-10-18

## (undated) RX ORDER — ONDANSETRON 2 MG/ML
INJECTION INTRAMUSCULAR; INTRAVENOUS
Status: DISPENSED
Start: 2023-10-18

## (undated) RX ORDER — BUPIVACAINE HYDROCHLORIDE 2.5 MG/ML
INJECTION, SOLUTION EPIDURAL; INFILTRATION; INTRACAUDAL
Status: DISPENSED
Start: 2023-10-18

## (undated) RX ORDER — DEXAMETHASONE SODIUM PHOSPHATE 4 MG/ML
INJECTION, SOLUTION INTRA-ARTICULAR; INTRALESIONAL; INTRAMUSCULAR; INTRAVENOUS; SOFT TISSUE
Status: DISPENSED
Start: 2023-10-18

## (undated) RX ORDER — FENTANYL CITRATE-0.9 % NACL/PF 10 MCG/ML
PLASTIC BAG, INJECTION (ML) INTRAVENOUS
Status: DISPENSED
Start: 2023-10-18

## (undated) RX ORDER — FENTANYL CITRATE 50 UG/ML
INJECTION, SOLUTION INTRAMUSCULAR; INTRAVENOUS
Status: DISPENSED
Start: 2023-10-18

## (undated) RX ORDER — MORPHINE SULFATE 1 MG/ML
INJECTION, SOLUTION EPIDURAL; INTRATHECAL; INTRAVENOUS
Status: DISPENSED
Start: 2023-10-18